# Patient Record
Sex: MALE | Race: WHITE | Employment: OTHER | URBAN - METROPOLITAN AREA
[De-identification: names, ages, dates, MRNs, and addresses within clinical notes are randomized per-mention and may not be internally consistent; named-entity substitution may affect disease eponyms.]

---

## 2017-01-20 ENCOUNTER — CLINICAL SUPPORT (OUTPATIENT)
Dept: CARDIOLOGY CLINIC | Age: 77
End: 2017-01-20

## 2017-01-20 DIAGNOSIS — Z95.810 PRESENCE OF AUTOMATIC CARDIOVERTER/DEFIBRILLATOR (AICD): Primary | ICD-10-CM

## 2017-03-21 RX ORDER — CARVEDILOL 12.5 MG/1
12.5 TABLET ORAL 2 TIMES DAILY WITH MEALS
Qty: 60 TAB | Refills: 5 | Status: SHIPPED | OUTPATIENT
Start: 2017-03-21 | End: 2018-02-17 | Stop reason: SDUPTHER

## 2017-03-21 RX ORDER — CHOLECALCIFEROL TAB 125 MCG (5000 UNIT) 125 MCG
TAB ORAL DAILY
Status: CANCELLED | OUTPATIENT
Start: 2017-03-21

## 2017-03-21 RX ORDER — CARBIDOPA AND LEVODOPA 25; 100 MG/1; MG/1
2 TABLET ORAL 3 TIMES DAILY
Status: CANCELLED | OUTPATIENT
Start: 2017-03-21

## 2017-03-21 RX ORDER — ROSUVASTATIN CALCIUM 10 MG/1
10 TABLET, COATED ORAL
Qty: 13 TAB | Refills: 5 | Status: SHIPPED | OUTPATIENT
Start: 2017-03-22 | End: 2017-08-01 | Stop reason: SDUPTHER

## 2017-03-21 NOTE — TELEPHONE ENCOUNTER
Patient lost all of his medication while traveling. He needs new rx's called into [facility] in [facility] [phone].

## 2017-03-28 ENCOUNTER — TELEPHONE (OUTPATIENT)
Dept: CARDIOLOGY CLINIC | Age: 77
End: 2017-03-28

## 2017-03-28 NOTE — TELEPHONE ENCOUNTER
Please call Manuel Matthews at [phone]. He needs to speak with you about upcoming hip surgery and blood thinning medication.      Thank you, Cam Pj

## 2017-03-29 NOTE — TELEPHONE ENCOUNTER
Please call the patient regarding his blood thinning medication. The patient can be reached at [phone]. He is currently scheduled to be seen for a procedure and needs information on possible changing the medication prior to the procedure. Thanks!

## 2017-03-30 NOTE — TELEPHONE ENCOUNTER
Pt last seen by NP in 12/16 but seen by you 10/16. He needs cardiac clearance and blood thinner instructions for R hip replacement by Dr. Loli Carter at [facility] 4/10/17. He takes  mg and Eliquis 5mg BID. He has CAD that is no amendable by stent/CABG. His ICD will be addressed by Dr. Danya Valenzuela. You did give risk stratifications in Oct. appt but is out dated for up coming surgery.

## 2017-04-07 ENCOUNTER — TELEPHONE (OUTPATIENT)
Dept: CARDIOLOGY CLINIC | Age: 77
End: 2017-04-07

## 2017-04-07 NOTE — TELEPHONE ENCOUNTER
The patient has a upcoming procedure and the nurse, Almas Bourne with [facility] anesthesia requested a clarifying not indicating the ICD/Pacer model. She can be reacehed at [phone] fax [phone]. Thanks!

## 2017-04-10 NOTE — TELEPHONE ENCOUNTER
Received call back from [facility] for pt hip surgery. Anesthesia wants OK to change ASA to 81 mg from 325 mg for surgery.  ( [phone] Temo Unger)

## 2017-05-05 ENCOUNTER — CLINICAL SUPPORT (OUTPATIENT)
Dept: CARDIOLOGY CLINIC | Age: 77
End: 2017-05-05

## 2017-05-05 ENCOUNTER — OFFICE VISIT (OUTPATIENT)
Dept: CARDIOLOGY CLINIC | Age: 77
End: 2017-05-05

## 2017-05-05 VITALS
RESPIRATION RATE: 16 BRPM | HEART RATE: 66 BPM | WEIGHT: 139.2 LBS | HEIGHT: 64 IN | DIASTOLIC BLOOD PRESSURE: 78 MMHG | BODY MASS INDEX: 23.76 KG/M2 | OXYGEN SATURATION: 97 % | SYSTOLIC BLOOD PRESSURE: 118 MMHG

## 2017-05-05 DIAGNOSIS — G20 PARKINSON'S DISEASE (HCC): ICD-10-CM

## 2017-05-05 DIAGNOSIS — R09.89 BRUIT OF RIGHT CAROTID ARTERY: ICD-10-CM

## 2017-05-05 DIAGNOSIS — I25.5 ISCHEMIC CARDIOMYOPATHY: ICD-10-CM

## 2017-05-05 DIAGNOSIS — I71.40 ABDOMINAL AORTIC ANEURYSM (AAA) WITHOUT RUPTURE: ICD-10-CM

## 2017-05-05 DIAGNOSIS — Z95.810 PRESENCE OF AUTOMATIC CARDIOVERTER/DEFIBRILLATOR (AICD): Primary | ICD-10-CM

## 2017-05-05 DIAGNOSIS — I48.4 ATYPICAL ATRIAL FLUTTER (HCC): ICD-10-CM

## 2017-05-05 DIAGNOSIS — I25.10 CORONARY ARTERY DISEASE INVOLVING NATIVE CORONARY ARTERY OF NATIVE HEART WITHOUT ANGINA PECTORIS: Primary | ICD-10-CM

## 2017-05-05 DIAGNOSIS — E78.00 PURE HYPERCHOLESTEROLEMIA: ICD-10-CM

## 2017-05-05 DIAGNOSIS — E55.9 VITAMIN D DEFICIENCY: ICD-10-CM

## 2017-05-05 DIAGNOSIS — I45.9 HEART BLOCK: ICD-10-CM

## 2017-05-05 RX ORDER — ASPIRIN 81 MG/1
81 TABLET ORAL DAILY
Qty: 90 TAB | Refills: 3
Start: 2017-05-05

## 2017-05-05 NOTE — PATIENT INSTRUCTIONS
If you're able to increase your water intake, it will help your dizziness. I heard a sound in your neck that I'd like investigated. I also need fasting labs for you. Please have your labs drawn (nothing to eat after midnight) and bring your breakfast and coffee with you. Then you can eat and have this test done on your neck the same day! no fever and no chills.

## 2017-05-05 NOTE — PROGRESS NOTES
Progress Note      Ian Fitzgerald is a 76 y.o. male. Last seen 10/16 by Dr. Mary Malloy and  by me. Here for routine follow-up of CAD, ICM and atypical aflutter. Patient Active Problem List   Diagnosis Code    CAD (coronary artery disease) I25.10    Parkinson's disease ([facility] [address] G20    Hyperlipidemia E78.5    STEMI (ST elevation myocardial infarction) ([facility] [address] I21.3    Heart block I45.9    Abdominal aortic aneurysm ([facility] [address] I71.4    Medication intolerance Z78.9    Ischemic cardiomyopathy I25.5    S/P ICD (internal cardiac defibrillator) procedure Z95.810    Vitamin D deficiency E55.9    Atypical atrial flutter ([facility] [address] I48.4          Cardiac testin. Echo  7/11/15 - EF 35%, basilar septum AK, inferior wall and inferobasilar wall. RV mild reduced function, mild AI, TR   10/13/15 - EF 15-20%, mild MR, moderate TR, mild to moderate AR    2. Cardiac catheterization  7/10/15 - severe native CAD w/ all proxs occluded, LAD after severly diseased first diagonal, patent moderately diseased SVG to OM 1, 2, SVG to RCA - Degenerated occluded - large clot burden in graft and diffuse distal graft and native RCA disease. Patent LIMA to LAD, LAD 60% distal to graft insertion, faint L to R collaterals. Given diffuse disease in culprit 23year old graft, PCI was not done. 3. Cholesterol   7/13/15 - TC 98, HDL 36, LDL 42.2, TG 99    4. Pacemaker placed 7/13/15  The pacemaker is a Medtronic Bright BENITES, model #ADDR [id number], serial [id number], atrial lead medtronic [id number], serial [id number], ventricular lead Medtronic model [id number] and serial [id number]    5. PERMANENT AICD INSERTION   DATE OF PROCEDURE: 3/1/2016 - Upgrade of the dual chamber pacemaker to dual chamber per Dr. Bray Search    Presenting sxs if CAD:  Chest pain (exertional exacerbation)    HPI  Feels well. No bleeding issues. Taking all medicines as prescribed.  Patient denies any exertional chest pain, palpitations, syncope, orthopnea, or paroxysmal nocturnal dyspnea. Some SOTELO he attributes to deconditioning. RLE edema due to hip surgery. Is working on increasing his water intake. Current Outpatient Prescriptions   Medication Sig    carvedilol (COREG) 12.5 mg tablet Take 1 Tab by mouth two (2) times daily (with meals).  rosuvastatin (CRESTOR) 10 mg tablet Take 1 Tab by mouth every Monday, Wednesday, Friday.  apixaban (ELIQUIS) 5 mg tablet Take 1 Tab by mouth two (2) times a day.  aspirin (ASPIRIN) 325 mg tablet Take 81 mg by mouth daily.  acetaminophen-codeine (TYLENOL-CODEINE #3) 300-30 mg per tablet Take 1 Tab by mouth every four (4) hours as needed for Pain (post op). Max Daily Amount: 6 Tabs.  GINKGO BILOBA (GINKOBA PO) Take  by mouth daily.  cholecalciferol, VITAMIN D3, (VITAMIN D3) 5,000 unit tab tablet Take  by mouth daily.  multivitamin (ONE A DAY) tablet Take 1 Tab by mouth daily.  coenzyme q10-vitamin e (COQ10 ) 100-100 mg-unit cap Take 1 Tab by mouth daily.  OTHER Take 1 Tab by mouth daily. Indications: Cholestoff- Nature Made    carbidopa-levodopa (SINEMET)  mg per tablet Take 2 Tabs by mouth three (3) times daily. No current facility-administered medications for this visit. No Known Allergies         Review of Systems   Constitutional: Negative for weight loss, malaise/fatigue and diaphoresis. Respiratory: Negative for cough, hemoptysis, sputum production and wheezing. Positive for shortness of breath as described above. Cardiovascular: Negative for chest pain, palpitations, orthopnea, claudication and PND. Positive for R sided leg swelling. Gastrointestinal: Negative for heartburn, nausea, vomiting, blood in stool and melena. Genitourinary: Negative for dysuria, hematuria and flank pain. Musculoskeletal: Negative for back pain and joint pain. Neurological: Negative for dizziness, focal weakness, seizures, loss of consciousness, weakness and headaches.    Endo/Heme/Allergies: Does not bruise/bleed easily. Psychiatric/Behavioral: Negative for mood disorders. Visit Vitals    /78 (BP 1 Location: Left arm, BP Patient Position: Sitting)    Pulse 66    Resp 16    Ht 5' 4\" (1.626 m)    Wt 139 lb 3.2 oz (63.1 kg)    SpO2 97%    BMI 23.89 kg/m2      Wt Readings from Last 3 Encounters:   05/05/17 139 lb 3.2 oz (63.1 kg)   12/19/16 137 lb 6.4 oz (62.3 kg)   10/18/16 137 lb 12.8 oz (62.5 kg)       Physical Exam   Vitals reviewed. Constitutional: He is oriented to person, place, and time. He appears well-developed. HENT:  Hearing intact, Eyes non-icteric  Head: Normocephalic. Neck: Neck supple. No JVD present. No tracheal deviation present. R sided bruit present. Heart: normal rate, regular rhythm, normal S1, S2, no murmurs, rubs, clicks or gallops. Pulses:Carotid pulses are 2+ on the right side, and 2+ on the left side. Pulmonary/Chest: Effort normal and breath sounds normal. He has no wheezes, rhonchi or rales. Abdominal: Soft. Bowel sounds are normal. No tenderness. He has no rebound. Musculoskeletal: He exhibits no edema. Moves all extremities well. Neurological: He is alert and oriented to person, place, and time. Skin: Skin is warm and dry. Psychiatric: He has a normal mood and affect. EKG:  Sinus at 66 bpm, first degree AV block, old inferior MI    ASSESSMENT and PLAN  Encounter Diagnoses   Name Primary?  Coronary artery disease involving native coronary artery of native heart without angina pectoris Yes    Ischemic cardiomyopathy     Pure hypercholesterolemia     Heart block     Parkinson's disease ([address]     Abdominal aortic aneurysm (AAA) without rupture ([facility] [address]     Vitamin D deficiency     Bruit of right carotid artery     Atypical atrial flutter [facility])       Mr. Lucy Tinsley is doing well from a cardiac perspective. He is having some mild SOTELO, which he attributes to decreased exercise, so plans to increase this.   Advised him that if his SOTELO worsens, he should contact us. No signs of volume overload. Mr. Sydney Pallas has not had lipid labs drawn as previously requested, so asked him to have them drawn in the near future, along with a Vitamin D level. Orders provided. Eliquis 5 mg bid continues for prevention of embolic CVA due to his hx of atypical atrial flutter. I note a new R-sided carotid bruit. Given his CAD, will obtain carotid dopplers to evaluate further. Dr. Ivette Pond managing patient's AAA. The patient was encouraged to continue current medications, exercise, eat a healthy diet and call with any new complaints or concerns. Oral and written instructions provided for patient. Patient verbalized understanding. Follow-up Disposition:  Return in about 6 months (around 11/5/2017).     Hermelinda Serrano, ANP

## 2017-05-05 NOTE — PROGRESS NOTES
Visit Vitals    /78 (BP 1 Location: Left arm, BP Patient Position: Sitting)    Pulse 66    Resp 16    Ht 5' 4\" (1.626 m)    Wt 139 lb 3.2 oz (63.1 kg)    SpO2 97%    BMI 23.89 kg/m2

## 2017-08-01 RX ORDER — ROSUVASTATIN CALCIUM 10 MG/1
10 TABLET, COATED ORAL
Qty: 13 TAB | Refills: 5 | Status: SHIPPED | OUTPATIENT
Start: 2017-08-02 | End: 2018-03-09 | Stop reason: SDUPTHER

## 2017-08-01 NOTE — TELEPHONE ENCOUNTER
Pt called and asked to schedule an appt for November per Brodie. After appt is made, refills will be approved. Pt confirmed understanding.  Appt made for November and rx approved by Dr. Carolin Fuentes

## 2017-08-03 ENCOUNTER — TELEPHONE (OUTPATIENT)
Dept: CARDIOLOGY CLINIC | Age: 77
End: 2017-08-03

## 2017-08-03 NOTE — TELEPHONE ENCOUNTER
Pt's daughter called to r/s the pt's remote from the 12th to after the 15th because he will be out of town on the 12th. Maritza Ramirez can be reached at [phone] or [phone].  [employer]

## 2017-10-24 ENCOUNTER — OFFICE VISIT (OUTPATIENT)
Dept: CARDIOLOGY CLINIC | Age: 77
End: 2017-10-24

## 2017-10-24 VITALS
WEIGHT: 138.6 LBS | HEIGHT: 64 IN | HEART RATE: 64 BPM | DIASTOLIC BLOOD PRESSURE: 62 MMHG | SYSTOLIC BLOOD PRESSURE: 120 MMHG | BODY MASS INDEX: 23.66 KG/M2

## 2017-10-24 DIAGNOSIS — I25.10 CORONARY ARTERY DISEASE INVOLVING NATIVE CORONARY ARTERY OF NATIVE HEART WITHOUT ANGINA PECTORIS: ICD-10-CM

## 2017-10-24 DIAGNOSIS — I25.5 ISCHEMIC CARDIOMYOPATHY: Primary | ICD-10-CM

## 2017-10-24 DIAGNOSIS — E78.00 PURE HYPERCHOLESTEROLEMIA: ICD-10-CM

## 2017-10-24 NOTE — PROGRESS NOTES
Visit Vitals    /62    Pulse 64    Ht 5' 4\" (1.626 m)    Wt 138 lb 9.6 oz (62.9 kg)    BMI 23.79 kg/m2

## 2017-10-24 NOTE — PROGRESS NOTES
LAST OFFICE VISIT : 5/5/2017      No diagnosis found. Jacqueline Chung is a 76 y.o. male  with hypertension, dyslipidemia, CAD s/p CABG referred for 6 month follow up.       Cardiac risk factors: dyslipidemia, male gender, hypertension. I have personally obtained the history from the patient. HISTORY OF PRESENTING ILLNESS     The pt reports that he has a rash across his chest, neck, and back for the last couple months. He reports that he has seen his primary care about this but has not seen a dermatologist. This rash is very itchy and he has not had any recent medication changes. He notes no bleeding on the eliquis or any irregularities in his heart rhythm. He is only using one pillow to sleep at night. The patient denies chest pain/ shortness of breath, orthopnea, PND, LE edema, palpitations, syncope, presyncope or fatigue. ACTIVE PROBLEM LIST     Patient Active Problem List    Diagnosis Date Noted    Vitamin D deficiency 05/05/2017    Atypical atrial flutter ([facility] [address] 05/05/2017    S/P ICD (internal cardiac defibrillator) procedure 03/01/2016    Ischemic cardiomyopathy 11/11/2015    Medication intolerance 07/29/2015    CAD (coronary artery disease) 07/10/2015    Parkinson's disease ([address] 07/10/2015    Hyperlipidemia 07/10/2015    STEMI (ST elevation myocardial infarction) ([address] 07/10/2015    Heart block 07/10/2015    Abdominal aortic aneurysm ([address] 07/10/2015           PAST MEDICAL HISTORY     Past Medical History:   Diagnosis Date    Ill-defined condition     MI    Parkinson's disease ([address]     Vitamin D deficiency 5/5/2017           PAST SURGICAL HISTORY     Past Surgical History:   Procedure Laterality Date    CARDIAC SURG PROCEDURE UNLIST      HX ORTHOPAEDIC      L hip replacement, R shoulder replacement.     HX PACEMAKER      INS PPM/ICD LED DUAL ONLY  7/13/2015         INS PPM/ICD LED DUAL ONLY  3/1/2016               ALLERGIES     No Known Allergies       FAMILY HISTORY     No family history on file. negative for cardiac disease       SOCIAL HISTORY     Social History     Social History    Marital status:      Spouse name: N/A    Number of children: N/A    Years of education: N/A     Social History Main Topics    Smoking status: Never Smoker    Smokeless tobacco: Never Used    Alcohol use No    Drug use: No    Sexual activity: Not on file     Other Topics Concern    Not on file     Social History Narrative         MEDICATIONS     Current Outpatient Prescriptions   Medication Sig    rosuvastatin (CRESTOR) 10 mg tablet Take 1 Tab by mouth every Monday, Wednesday, Friday.  apixaban (ELIQUIS) 5 mg tablet Take 1 Tab by mouth two (2) times a day.  aspirin delayed-release 81 mg tablet Take 1 Tab by mouth daily.  carvedilol (COREG) 12.5 mg tablet Take 1 Tab by mouth two (2) times daily (with meals).  acetaminophen-codeine (TYLENOL-CODEINE #3) 300-30 mg per tablet Take 1 Tab by mouth every four (4) hours as needed for Pain (post op). Max Daily Amount: 6 Tabs.  GINKGO BILOBA (GINKOBA PO) Take  by mouth daily.  cholecalciferol, VITAMIN D3, (VITAMIN D3) 5,000 unit tab tablet Take  by mouth daily.  multivitamin (ONE A DAY) tablet Take 1 Tab by mouth daily.  coenzyme q10-vitamin e (COQ10 ) 100-100 mg-unit cap Take 1 Tab by mouth daily.  OTHER Take 1 Tab by mouth daily. Indications: Cholestoff- Nature Made    carbidopa-levodopa (SINEMET)  mg per tablet Take 2 Tabs by mouth three (3) times daily. No current facility-administered medications for this visit. I have reviewed the nurses notes, vitals, problem list, allergy list, medical history, family, social history and medications. REVIEW OF SYMPTOMS      General: Pt denies excessive weight gain or loss. Pt is able to conduct ADL's  HEENT: Denies blurred vision, headaches, hearing loss, epistaxis and difficulty swallowing.   Respiratory: Denies cough, congestion, shortness of breath, SOTELO, wheezing or stridor. Cardiovascular: Denies precordial pain, palpitations, edema or PND  Gastrointestinal: Denies poor appetite, indigestion, abdominal pain or blood in stool  Genitourinary: Denies hematuria, dysuria, increased urinary frequency  Musculoskeletal: Denies joint pain or swelling from muscles or joints  Neurologic: Denies tremor, paresthesias, headache, or sensory motor disturbance  Psychiatric: Denies confusion, insomnia, depression  Integumentray: Denies rash, itching or ulcers. Hematologic: Denies easy bruising, bleeding     PHYSICAL EXAMINATION      There were no vitals filed for this visit. General: Well developed, in no acute distress. HEENT: No jaundice, oral mucosa moist, no oral ulcers  Neck: Supple, no stiffness, no lymphadenopathy, supple  Heart:  Normal S1/S2 negative S3 or S4. Regular, no murmur, gallop or rub, no jugular venous distention  Respiratory: Clear bilaterally x 4, no wheezing or rales  Extremities:  No edema, normal cap refill, no cyanosis. Musculoskeletal: No clubbing, no deformities  Neuro: Resting tremor in his right hand. A&Ox3, speech clear, gait stable, cooperative, no focal neurologic deficits  Skin: Skin color is normal. No rashes or lesions. Non diaphoretic, moist.       DIAGNOSTIC DATA     1. Echo  7/11/15 - EF 35%, basilar septum AK, inferior wall and inferobasilar wall. RV mild reduced function, mild AI, TR   10/13/15 - EF 15-20%, mild MR, moderate TR, mild to moderate DC    2. Cardiac catheterization  7/10/15 - severe native CAD w/ all proxs occluded, LAD after severly diseased first diagonal, patent moderately diseased SVG to OM 1, 2, SVG to RCA - Degenerated occluded - large clot burden in graft and diffuse distal graft and native RCA disease. Patent LIMA to LAD, LAD 60% distal to graft insertion, faint L to R collaterals. Given diffuse disease in culprit 23year old graft, PCI was not done.      3. Cholesterol   7/13/15 - TC 98, HDL 36, LDL 42.2, TG 99    4. Pacemaker placed 7/13/15  The pacemaker is a Medtronic Bright BENITES, model #ADDR [id number], serial [id number], atrial lead medtronic [id number], serial [id number], ventricular lead Medtronic model [id number] and serial [id number]    5. PERMANENT AICD INSERTION   DATE OF PROCEDURE: 3/1/2016 - Upgrade of the dual chamber pacemaker to dual chamber per Dr. Aissatou Hernandez Results   Component Value Date/Time    WBC 4.0 02/11/2016 10:44 AM    Hemoglobin (POC) 16.3 07/10/2015 08:24 PM    HGB 15.4 02/11/2016 10:44 AM    Hematocrit (POC) 48 07/10/2015 08:24 PM    HCT 47.0 02/11/2016 10:44 AM    PLATELET 446 19/11/3615 10:44 AM    MCV 91 02/11/2016 10:44 AM      Lab Results   Component Value Date/Time    Sodium 141 03/01/2016 11:19 AM    Potassium 4.0 03/01/2016 11:19 AM    Chloride 106 03/01/2016 11:19 AM    CO2 28 03/01/2016 11:19 AM    Anion gap 7 03/01/2016 11:19 AM    Glucose 84 03/01/2016 11:19 AM    BUN 22 03/01/2016 11:19 AM    Creatinine 1.09 03/01/2016 11:19 AM    BUN/Creatinine ratio 20 03/01/2016 11:19 AM    GFR est AA >60 03/01/2016 11:19 AM    GFR est non-AA >60 03/01/2016 11:19 AM    Calcium 9.0 03/01/2016 11:19 AM    Bilirubin, total 0.9 11/25/2015 03:51 PM    AST (SGOT) 26 11/25/2015 03:51 PM    Alk. phosphatase 102 11/25/2015 03:51 PM    Protein, total 7.4 11/25/2015 03:51 PM    Albumin 4.5 11/25/2015 03:51 PM    Globulin 2.9 11/25/2015 03:51 PM    A-G Ratio 1.6 11/25/2015 03:51 PM    ALT (SGPT) 16 11/25/2015 03:51 PM           ASSESSMENT/RECOMMENDATIONS:.      1. CAD  - he is asymptomatic at this time. I do not believe any further cardiac testing is needed. Continue risk factor modification. 2. Dyslipidemia  - His has no current lipids so I will provide him with a lab slip to get this checked before his next visit. 3. Ischemic cardiomyopathy  - he appears nearer to NYHA class I, no evidence on physical exam of heart failure. Continue current medical regimen.  He is currently not on an ace inhibitor I believe this was related to renal dysfunction as noted in July of 2015. 4. Return in 6 months or PRN. No orders of the defined types were placed in this encounter. Follow-up Disposition: Not on File      I have discussed the diagnosis with  Lisa Osborne and the intended plan as seen in the above orders. Questions were answered concerning future plans. I have discussed medication side effects and warnings with the patient as well. Thank you,  Randa Morales MD for involving me in the care of  Lisa Osborne. Please do not hesitate to contact me for further questions/concerns. Written by Jimbo Verma, as dictated by Sanjuana Partida MD.     Brooklyn Aguayo MD, [address] [place] [address] Rd[address]      [facility], [address] [place] [address], [facility] [address]      [phone] / [phone] Fax

## 2017-10-25 LAB
ALBUMIN SERPL-MCNC: 4.7 G/DL (ref 3.5–4.8)
ALP SERPL-CCNC: 110 IU/L (ref 39–117)
ALT SERPL-CCNC: 7 IU/L (ref 0–44)
AST SERPL-CCNC: 25 IU/L (ref 0–40)
BILIRUB DIRECT SERPL-MCNC: 0.27 MG/DL (ref 0–0.4)
BILIRUB SERPL-MCNC: 0.9 MG/DL (ref 0–1.2)
CHOLEST SERPL-MCNC: 170 MG/DL (ref 100–199)
HDLC SERPL-MCNC: 35 MG/DL
INTERPRETATION, 910389: NORMAL
LDLC SERPL CALC-MCNC: 118 MG/DL (ref 0–99)
PROT SERPL-MCNC: 6.9 G/DL (ref 6–8.5)
TRIGL SERPL-MCNC: 86 MG/DL (ref 0–149)
VLDLC SERPL CALC-MCNC: 17 MG/DL (ref 5–40)

## 2017-11-02 DIAGNOSIS — I25.10 CORONARY ARTERY DISEASE INVOLVING NATIVE CORONARY ARTERY OF NATIVE HEART WITHOUT ANGINA PECTORIS: Primary | ICD-10-CM

## 2017-12-11 ENCOUNTER — TELEPHONE (OUTPATIENT)
Dept: CARDIOLOGY CLINIC | Age: 77
End: 2017-12-11

## 2017-12-11 NOTE — TELEPHONE ENCOUNTER
Pt's emergency contact called regarding his remote check. Please give her a call back @ [phone]. Thanks!   Trey Adkins

## 2017-12-12 ENCOUNTER — OFFICE VISIT (OUTPATIENT)
Dept: CARDIOLOGY CLINIC | Age: 77
End: 2017-12-12

## 2017-12-12 DIAGNOSIS — Z95.810 PRESENCE OF AUTOMATIC CARDIOVERTER/DEFIBRILLATOR (AICD): Primary | ICD-10-CM

## 2018-02-15 ENCOUNTER — TELEPHONE (OUTPATIENT)
Dept: CARDIOLOGY CLINIC | Age: 78
End: 2018-02-15

## 2018-02-17 RX ORDER — CARVEDILOL 12.5 MG/1
12.5 TABLET ORAL 2 TIMES DAILY WITH MEALS
Qty: 60 TAB | Refills: 5 | Status: SHIPPED | OUTPATIENT
Start: 2018-02-17 | End: 2018-02-21 | Stop reason: SDUPTHER

## 2018-02-22 RX ORDER — CARVEDILOL 12.5 MG/1
12.5 TABLET ORAL 2 TIMES DAILY WITH MEALS
Qty: 60 TAB | Refills: 5 | Status: SHIPPED | OUTPATIENT
Start: 2018-02-22 | End: 2019-10-29

## 2018-03-09 RX ORDER — ROSUVASTATIN CALCIUM 10 MG/1
10 TABLET, COATED ORAL
Qty: 13 TAB | Refills: 3 | Status: SHIPPED | OUTPATIENT
Start: 2018-03-09 | End: 2018-04-05 | Stop reason: SDUPTHER

## 2018-03-15 ENCOUNTER — OFFICE VISIT (OUTPATIENT)
Dept: CARDIOLOGY CLINIC | Age: 78
End: 2018-03-15

## 2018-03-15 DIAGNOSIS — Z95.810 PRESENCE OF AUTOMATIC CARDIOVERTER/DEFIBRILLATOR (AICD): Primary | ICD-10-CM

## 2018-04-05 RX ORDER — ROSUVASTATIN CALCIUM 10 MG/1
10 TABLET, COATED ORAL
Qty: 39 TAB | Refills: 0 | Status: SHIPPED | OUTPATIENT
Start: 2018-04-06 | End: 2019-03-05

## 2018-04-05 NOTE — TELEPHONE ENCOUNTER
Requested Prescriptions     Pending Prescriptions Disp Refills    rosuvastatin (CRESTOR) 10 mg tablet 39 Tab 0     Sig: Take 1 Tab by mouth every Monday, Wednesday, Friday.      rx approved by Dr. Wiley Balderas to [address]  on [facility]

## 2018-04-25 ENCOUNTER — TELEPHONE (OUTPATIENT)
Dept: CARDIOLOGY CLINIC | Age: 78
End: 2018-04-25

## 2018-04-25 NOTE — TELEPHONE ENCOUNTER
Pt received information from [facility] [employer] regarding his medication eliquis and [employer] suggested that the pt switch from eliquis to warfarin. Pt would like to know if this is an option and pt's daughter is also calling to ask if the pt switched his medication if this would help the pt's restless legs, pt has restless leg syndrome. Jose Lama can be reached @ [phone]. Thanks!

## 2018-04-26 NOTE — TELEPHONE ENCOUNTER
Daughter wanted to know if you thought restless leg would be a side effect from eliquis . Informed her that I had not heard of that side effect but would check with you.

## 2018-04-30 DIAGNOSIS — I25.10 CORONARY ARTERY DISEASE INVOLVING NATIVE CORONARY ARTERY OF NATIVE HEART WITHOUT ANGINA PECTORIS: ICD-10-CM

## 2018-05-08 ENCOUNTER — TELEPHONE (OUTPATIENT)
Dept: CARDIOLOGY CLINIC | Age: 78
End: 2018-05-08

## 2018-05-08 NOTE — TELEPHONE ENCOUNTER
Pt's daughter is calling to clarify the dosage for the pt's medication carvedilol. She can be reached @ [phone]. Thanks!

## 2018-05-09 NOTE — TELEPHONE ENCOUNTER
Informed daughter that Coreg is 12.5 mg BID in his chart. She states PCP recently gave him 6.25 mg BID. Informed her that Dr Nanda Wright has not examined him since 10/17 and PCP may have had a reason to change it. She will call PCP.

## 2018-05-10 DIAGNOSIS — I25.10 CORONARY ARTERY DISEASE INVOLVING NATIVE CORONARY ARTERY OF NATIVE HEART WITHOUT ANGINA PECTORIS: ICD-10-CM

## 2018-06-06 ENCOUNTER — OFFICE VISIT (OUTPATIENT)
Dept: CARDIOLOGY CLINIC | Age: 78
End: 2018-06-06

## 2018-06-06 ENCOUNTER — CLINICAL SUPPORT (OUTPATIENT)
Dept: CARDIOLOGY CLINIC | Age: 78
End: 2018-06-06

## 2018-06-06 VITALS
HEIGHT: 64 IN | SYSTOLIC BLOOD PRESSURE: 110 MMHG | OXYGEN SATURATION: 98 % | DIASTOLIC BLOOD PRESSURE: 70 MMHG | HEART RATE: 66 BPM | WEIGHT: 142 LBS | BODY MASS INDEX: 24.24 KG/M2

## 2018-06-06 DIAGNOSIS — I48.4 ATYPICAL ATRIAL FLUTTER (HCC): ICD-10-CM

## 2018-06-06 DIAGNOSIS — Z95.810 PRESENCE OF AUTOMATIC CARDIOVERTER/DEFIBRILLATOR (AICD): ICD-10-CM

## 2018-06-06 DIAGNOSIS — Z95.810 CARDIAC DEFIBRILLATOR IN SITU: Primary | ICD-10-CM

## 2018-06-06 DIAGNOSIS — E78.00 PURE HYPERCHOLESTEROLEMIA: ICD-10-CM

## 2018-06-06 DIAGNOSIS — I25.5 ISCHEMIC CARDIOMYOPATHY: Primary | ICD-10-CM

## 2018-06-06 DIAGNOSIS — I25.10 CORONARY ARTERY DISEASE INVOLVING NATIVE CORONARY ARTERY OF NATIVE HEART WITHOUT ANGINA PECTORIS: ICD-10-CM

## 2018-06-06 NOTE — PROGRESS NOTES
LAST OFFICE VISIT : 6/6/2018        ICD-10-CM ICD-9-CM   1. Ischemic cardiomyopathy I25.5 414.8   2. Coronary artery disease involving native coronary artery of native heart without angina pectoris I25.10 414.01   3. Presence of automatic cardioverter/defibrillator (AICD) Z95.810 V45.02   4. Pure hypercholesterolemia E78.00 272.0   5. Atypical atrial flutter [facility]) I48.4 427.32            Dung Graves is a [de-identified] y.o. male with CAD, STEMI, HLD, and Atrial flutter referred for f/u on his current conditions. Cardiac risk factors: CAD, STEMI, dyslipidemia, ischemic cardiomyopathy. I have personally obtained the history from the patient. HISTORY OF PRESENTING ILLNESS     Pt reports he is doing well overall. However, he has been experiencing some SOTELO coming back from picking up his mail from the mailbox, approximately walking about 70 feet. This is a recent development. Pt hasn't needed to use a pillow to help him sleep. AICD was checked today. Pt has been on lisinopril before, and cannot get back on it since he his kidney function has decreased. Pt has self discontinued his Crestor, as he believes he's developed an adverse reaction to Crestor (myalgia). He has tried using Cholest-off, but hasn't been taking that for some time. He also admits to reducing exercise. Last lipid panel indicates his cholesterol is not well control. The patient denies chest pain, orthopnea, PND, LE edema, palpitations, syncope, presyncope or fatigue.          ACTIVE PROBLEM LIST     Patient Active Problem List    Diagnosis Date Noted    Vitamin D deficiency 05/05/2017    Atypical atrial flutter ([facility] [address] 05/05/2017    S/P ICD (internal cardiac defibrillator) procedure 03/01/2016    Ischemic cardiomyopathy 11/11/2015    Medication intolerance 07/29/2015    CAD (coronary artery disease) 07/10/2015    Parkinson's disease ([facility] [address] 07/10/2015    Hyperlipidemia 07/10/2015    STEMI (ST elevation myocardial infarction) ([facility] [address] 07/10/2015    Heart block 07/10/2015    Abdominal aortic aneurysm ([facility] [address] 07/10/2015           PAST MEDICAL HISTORY     Past Medical History:   Diagnosis Date    Ill-defined condition     MI    Parkinson's disease ([facility] [address]     Vitamin D deficiency 5/5/2017           PAST SURGICAL HISTORY     Past Surgical History:   Procedure Laterality Date    CARDIAC SURG PROCEDURE UNLIST      HX ORTHOPAEDIC      L hip replacement, R shoulder replacement.  HX PACEMAKER      INS PPM/ICD LED DUAL ONLY  7/13/2015         INS PPM/ICD LED DUAL ONLY  3/1/2016               ALLERGIES     No Known Allergies       FAMILY HISTORY     No family history on file. negative for cardiac disease       SOCIAL HISTORY     Social History     Social History    Marital status:      Spouse name: N/A    Number of children: N/A    Years of education: N/A     Social History Main Topics    Smoking status: Never Smoker    Smokeless tobacco: Never Used    Alcohol use No    Drug use: No    Sexual activity: Not Asked     Other Topics Concern    None     Social History Narrative         MEDICATIONS     Current Outpatient Prescriptions   Medication Sig    apixaban (ELIQUIS) 5 mg tablet Take 1 Tab by mouth two (2) times a day.  rosuvastatin (CRESTOR) 10 mg tablet Take 1 Tab by mouth every Monday, Wednesday, Friday.  carvedilol (COREG) 12.5 mg tablet Take 1 Tab by mouth two (2) times daily (with meals).  aspirin delayed-release 81 mg tablet Take 1 Tab by mouth daily.  cholecalciferol, VITAMIN D3, (VITAMIN D3) 5,000 unit tab tablet Take  by mouth daily.  coenzyme q10-vitamin e (COQ10 ) 100-100 mg-unit cap Take 1 Tab by mouth daily.  carbidopa-levodopa (SINEMET)  mg per tablet Take 2 Tabs by mouth three (3) times daily.  acetaminophen-codeine (TYLENOL-CODEINE #3) 300-30 mg per tablet Take 1 Tab by mouth every four (4) hours as needed for Pain (post op). Max Daily Amount: 6 Tabs.     GINKGO BILOBA (GINKOBA PO) Take  by mouth daily.  multivitamin (ONE A DAY) tablet Take 1 Tab by mouth daily.  OTHER Take 1 Tab by mouth daily. Indications: Cholestoff- Nature Made     No current facility-administered medications for this visit. I have reviewed the nurses notes, vitals, problem list, allergy list, medical history, family, social history and medications. REVIEW OF SYMPTOMS      General: Pt denies excessive weight gain or loss. Pt is able to conduct ADL's  HEENT: Denies blurred vision, headaches, hearing loss, epistaxis and difficulty swallowing. Respiratory: Denies cough, congestion, shortness of breath, SOTELO, wheezing or stridor. Cardiovascular: Denies precordial pain, palpitations, edema or PND  Gastrointestinal: Denies poor appetite, indigestion, abdominal pain or blood in stool  Genitourinary: Denies hematuria, dysuria, increased urinary frequency  Musculoskeletal: Denies joint pain or swelling from muscles or joints  Neurologic: Denies tremor, paresthesias, headache, or sensory motor disturbance  Psychiatric: Denies confusion, insomnia, depression  Integumentray: Denies rash, itching or ulcers. Hematologic: Denies easy bruising, bleeding     PHYSICAL EXAMINATION      Vitals:    06/06/18 1454   BP: 110/70   Pulse: 66   SpO2: 98%   Weight: 142 lb (64.4 kg)   Height: 5' 4\" (1.626 m)     General: Well developed, in no acute distress. HEENT: No jaundice, oral mucosa moist, no oral ulcers  Neck: Supple, no stiffness, no lymphadenopathy, supple  Heart: regular rate and rhythm  Respiratory: Clear bilaterally x 4, no wheezing or rales  Extremities:  No edema, normal cap refill, no cyanosis. Musculoskeletal: No clubbing, no deformities  Neuro: A&Ox3, speech clear, gait stable, cooperative, no focal neurologic deficits  Skin: Skin color is normal. No rashes or lesions. Non diaphoretic, moist.    EKG: Sinus bradycardia with RBBB, which is a new finding.       DIAGNOSTIC DATA     1. Echo  7/11/15 - EF 35%, basilar septum AK, inferior wall and inferobasilar wall. RV mild reduced function, mild AI, TR   10/13/15 - EF 15-20%, mild MR, moderate TR, mild to moderate IA    2. Cardiac catheterization  7/10/15 - severe native CAD w/ all proxs occluded, LAD after severly diseased first diagonal, patent moderately diseased SVG to OM 1, 2, SVG to RCA - Degenerated occluded - large clot burden in graft and diffuse distal graft and native RCA disease. Patent LIMA to LAD, LAD 60% distal to graft insertion, faint L to R collaterals. Given diffuse disease in culprit 23year old graft, PCI was not done. 3. Cholesterol   7/13/15 - TC 98, HDL 36, LDL 42.2, TG 99  4/30/18- , HDL 34, , 81    4. Pacemaker placed 7/13/15  The pacemaker is a Medtronic Adapta DR, model #ADDR [id number], serial [id number], atrial lead medtronic [id number], serial [id number], ventricular lead Medtronic model [id number] and serial [id number]    5. PERMANENT AICD INSERTION   DATE OF PROCEDURE: 3/1/2016 - Upgrade of the dual chamber pacemaker to dual chamber per Dr. Myrtle Hamm Results   Component Value Date/Time    WBC 4.0 02/11/2016 10:44 AM    Hemoglobin (POC) 16.3 07/10/2015 08:24 PM    HGB 15.4 02/11/2016 10:44 AM    Hematocrit (POC) 48 07/10/2015 08:24 PM    HCT 47.0 02/11/2016 10:44 AM    PLATELET 564 (L) 92/90/8997 10:44 AM    MCV 91 02/11/2016 10:44 AM      Lab Results   Component Value Date/Time    Sodium 141 03/01/2016 11:19 AM    Potassium 4.0 03/01/2016 11:19 AM    Chloride 106 03/01/2016 11:19 AM    CO2 28 03/01/2016 11:19 AM    Anion gap 7 03/01/2016 11:19 AM    Glucose 84 03/01/2016 11:19 AM    BUN 22 (H) 03/01/2016 11:19 AM    Creatinine 1.09 03/01/2016 11:19 AM    BUN/Creatinine ratio 20 03/01/2016 11:19 AM    GFR est AA >60 03/01/2016 11:19 AM    GFR est non-AA >60 03/01/2016 11:19 AM    Calcium 9.0 03/01/2016 11:19 AM    Bilirubin, total 0.9 10/24/2017 01:56 PM    AST (SGOT) 25 10/24/2017 01:56 PM    Alk. phosphatase 110 10/24/2017 01:56 PM    Protein, total 6.9 10/24/2017 01:56 PM    Albumin 4.7 10/24/2017 01:56 PM    Globulin 2.9 11/25/2015 03:51 PM    A-G Ratio 1.6 11/25/2015 03:51 PM    ALT (SGPT) 7 10/24/2017 01:56 PM           ASSESSMENT/RECOMMENDATIONS:.      1. Atrial Flutter: Pt is currently on Apixaban with no bleeding issues  2. Heart Failure: He has NYHA class 1-2 right now, and his last echo was about 3 years ago. I recommended repeat echo now to check his heart function. His last heart function was 15%  3. Dyslipidmia: Lipids are followed by his PCP. Pt has been statin intolerant due to having myalgias when using simvastatin and rosuvastatin. The option of taking Repatha was also discussed to better manage his lipids. Pt was advised to continue reducing his fat intake, avoid animal meat, and to increase exercise.    -Will attempt to get Praluent for him and will work on this  4. Cardiomyopathy: Pt cannot be placed on ACE inhibitor since his creatinine was elevated. Should he have worsening sx of heart failure, he may be placed on Entresto  5. Follow up in 3 months    Orders Placed This Encounter    AMB POC EKG ROUTINE W/ 12 LEADS, INTER & REP     Order Specific Question:   Reason for Exam:     Answer:   cad        Follow-up Disposition:  Return in about 3 months (around 9/6/2018). I have discussed the diagnosis with  Lisa Osborne and the intended plan as seen in the above orders. Questions were answered concerning future plans. I have discussed medication side effects and warnings with the patient as well. Thank you,  Rosanna Colunga MD for involving me in the care of  Lisa Osborne. Please do not hesitate to contact me for further questions/concerns. Written by Rory Nolasco, as dictated by Ash Ramirez MD.     Sofia Villavicencio MD, [facility]    Patient Care Team:  Rosanna Colunga MD as PCP - [facility])  Ash Ramirez MD as Physician (Cardiology)  Creig Galeazzi, MD as Physician (Cardiology)  Arline Roberto RN as Ambulatory Care Navigator (Cardiology)  Torrey Cota MD (Orthopedic Surgery)  Fidel Dennis MD as Physician (General and Vascular Surgery)    [address], [address] [place] [address] Drive      [phone] / [phone] Fax

## 2018-06-06 NOTE — PROGRESS NOTES
Once in a while a little discomfort in chest     Visit Vitals    /70 (BP 1 Location: Right arm, BP Patient Position: Sitting)    Pulse 66    Ht 5' 4\" (1.626 m)    Wt 142 lb (64.4 kg)    SpO2 98%    BMI 24.37 kg/m2

## 2018-06-06 NOTE — MR AVS SNAPSHOT
[address] [place] [address] [facility] 
[phone] Patient: Richie Biggs MRN: [id number] KCK:[birth year]01 Visit Information Date & Time Provider Department Dept. Phone Encounter #  
 6/6/2018  3:00 PM Maria Del Carmen Gomez MD [facility] Leigh Chris [phone] [id number] Follow-up Instructions Return in about 3 months (around 9/6/2018). Your Appointments 6/15/2018 10:00 AM  
ECHO CARDIOGRAMS 2D with LEANNE NAVA  
[facility] ([facility]) Appt Note: echo per dr Zhang Nassar [address] [facility] [address]  
[phone]  
  
   
 [address] [facility] [address] [address]  
  
    
 9/18/2018 11:40 AM  
ESTABLISHED PATIENT with Maria Del Carmen Gomez MD  
[facility] ([place] SCHEDULING) Appt Note: 3 mo fup  
 [address] [facility] [address]  
[phone]  
  
   
 [address] [place] [address] [address] [address]  
  
    
 6/26/2019 11:30 AM  
PACEMAKER with PACEMAKERSPARLKE  
[facility] ([place] SCHEDULING) Appt Note: jesus/icd/stf/lat  
 [address] [facility] [address] [address]  
[phone]  
  
   
 [address] [place] [facility]  
  
    
  
 9/13/2018 10:45 AM  
REMOTE OFFICE VISIT with Valencia Aggarwal [facility] ([facility] SCHEDULING) Appt Note: lat icd/stf  
 [address] [facility] [address]  
[phone]  
  
   
 [address] [facility] [address] [address]  
  
    
 12/18/2018 10:15 AM  
REMOTE OFFICE VISIT with Valencia Aggarwal [facility] (R[facility] SCHEDULING) Appt Note: lat icd/stf  
 [address] [facility]  
[phone]  
  
    
 3/21/2019  9:00 AM  
 REMOTE OFFICE VISIT with Jorge Soriano [facility] (R[facility] SCHEDULING) Appt Note: lat icd/stf  
 [address] [facility] [address] [facility]  
[phone] Upcoming Health Maintenance Date Due DTaP/Tdap/Td series (1 - Tdap) 1/26/1961 ZOSTER VACCINE AGE 60> 11/26/1997 GLAUCOMA SCREENING Q2Y 1/26/2003 Pneumococcal 65+ Low/Medium Risk (1 of 2 - PCV13) 1/26/2003 MEDICARE YEARLY EXAM 3/14/2018 Influenza Age 5 to Adult 8/1/2018 Allergies as of 6/6/2018  Review Complete On: 6/6/2018 By: Eric Groves MD  
 No Known Allergies Current Immunizations  Never Reviewed No immunizations on file. Not reviewed this visit You Were Diagnosed With   
  
 Codes Comments Ischemic cardiomyopathy    -  Primary ICD-10-CM: I25.5 ICD-9-CM: 414.8 Coronary artery disease involving native coronary artery of native heart without angina pectoris     ICD-10-CM: I25.10 ICD-9-CM: 414.01 Presence of automatic cardioverter/defibrillator (AICD)     ICD-10-CM: Z95.810 ICD-9-CM: V45.02   
 Pure hypercholesterolemia     ICD-10-CM: E78.00 ICD-9-CM: 272.0 Atypical atrial flutter (HCC)     ICD-10-CM: I48.4 ICD-9-CM: 427.32 Vitals BP Pulse Height(growth percentile) Weight(growth percentile) SpO2 BMI  
 110/70 (BP 1 Location: Right arm, BP Patient Position: Sitting) 66 5' 4\" (1.626 m) 142 lb (64.4 kg) 98% 24.37 kg/m2 Smoking Status Never Smoker Vitals History BMI and BSA Data Body Mass Index Body Surface Area  
 24.37 kg/m 2 1.71 m 2 Preferred Pharmacy Pharmacy Name Phone [address] [phone] Your Updated Medication List  
  
   
This list is accurate as of 6/6/18  3:58 PM.  Always use your most recent med list.  
  
  
  
  
 acetaminophen-codeine 300-30 mg per tablet Commonly known as:  TYLENOL-CODEINE #3  
 Take 1 Tab by mouth every four (4) hours as needed for Pain (post op). Max Daily Amount: 6 Tabs. apixaban 5 mg tablet Commonly known as:  Aguilar Sheets Take 1 Tab by mouth two (2) times a day. aspirin delayed-release 81 mg tablet Take 1 Tab by mouth daily. carvedilol 12.5 mg tablet Commonly known as:  Wendall Lundborg Take 1 Tab by mouth two (2) times daily (with meals). cholecalciferol (VITAMIN D3) 5,000 unit Tab tablet Commonly known as:  VITAMIN D3 Take  by mouth daily. COQ10  100-100 mg-unit Cap Generic drug:  coenzyme q10-vitamin e Take 1 Tab by mouth daily. GINKOBA PO Take  by mouth daily. multivitamin tablet Commonly known as:  ONE A DAY Take 1 Tab by mouth daily. OTHER Take 1 Tab by mouth daily. Indications: Cholestoff- Nature Made  
  
 rosuvastatin 10 mg tablet Commonly known as:  CRESTOR Take 1 Tab by mouth every Monday, Wednesday, Friday. SINEMET  mg per tablet Generic drug:  carbidopa-levodopa Take 2 Tabs by mouth three (3) times daily. We Performed the Following AMB POC EKG ROUTINE W/ 12 LEADS, INTER & REP [74412 CPT(R)] Follow-up Instructions Return in about 3 months (around 9/6/2018). [facility]! Dear Mariana Heads: Thank you for requesting a BetterYou account. Our records indicate that you already have an active BetterYou account. You can access your account anytime at [email or web address] Did you know that you can access your hospital and ER discharge instructions at any time in BetterYou? You can also review all of your test results from your hospital stay or ER visit. Additional Information If you have questions, please visit the Frequently Asked Questions section of the BetterYou website at [email or web address] Remember, BetterYou is NOT to be used for urgent needs. For medical emergencies, dial 911. Now available from your iPhone and Android! Please provide this summary of care documentation to your next provider. Your primary care clinician is listed as Mathew Fregoso. If you have any questions after today's visit, please call [phone].

## 2018-06-15 ENCOUNTER — CLINICAL SUPPORT (OUTPATIENT)
Dept: CARDIOLOGY CLINIC | Age: 78
End: 2018-06-15

## 2018-06-15 DIAGNOSIS — I48.92 ATRIAL FLUTTER, UNSPECIFIED TYPE (HCC): Primary | ICD-10-CM

## 2018-06-15 DIAGNOSIS — I48.92 ATRIAL FLUTTER, UNSPECIFIED TYPE (HCC): ICD-10-CM

## 2018-12-31 DIAGNOSIS — I25.10 CORONARY ARTERY DISEASE INVOLVING NATIVE CORONARY ARTERY OF NATIVE HEART WITHOUT ANGINA PECTORIS: ICD-10-CM

## 2018-12-31 RX ORDER — CARBIDOPA AND LEVODOPA 25; 100 MG/1; MG/1
2 TABLET ORAL 3 TIMES DAILY
Status: CANCELLED | OUTPATIENT
Start: 2018-12-31

## 2019-03-05 ENCOUNTER — OFFICE VISIT (OUTPATIENT)
Dept: CARDIOLOGY CLINIC | Age: 79
End: 2019-03-05

## 2019-03-05 VITALS
DIASTOLIC BLOOD PRESSURE: 70 MMHG | BODY MASS INDEX: 22.98 KG/M2 | HEART RATE: 80 BPM | SYSTOLIC BLOOD PRESSURE: 130 MMHG | HEIGHT: 64 IN | WEIGHT: 134.6 LBS

## 2019-03-05 DIAGNOSIS — I25.5 ISCHEMIC CARDIOMYOPATHY: ICD-10-CM

## 2019-03-05 DIAGNOSIS — I25.10 CORONARY ARTERY DISEASE INVOLVING NATIVE CORONARY ARTERY OF NATIVE HEART WITHOUT ANGINA PECTORIS: ICD-10-CM

## 2019-03-05 DIAGNOSIS — I48.4 ATYPICAL ATRIAL FLUTTER (HCC): Primary | ICD-10-CM

## 2019-03-05 RX ORDER — NITROGLYCERIN 0.4 MG/1
0.4 TABLET SUBLINGUAL
Qty: 1 BOTTLE | Refills: 5 | Status: SHIPPED | OUTPATIENT
Start: 2019-03-05 | End: 2019-03-11 | Stop reason: SDUPTHER

## 2019-03-05 NOTE — PROGRESS NOTES
Visit Vitals  /70   Pulse 80   Ht 5' 4\" (1.626 m)   Wt 134 lb 9.6 oz (61.1 kg)   BMI 23.10 kg/m²     Medication changes for this OV VO Dr Lois Mcknight

## 2019-03-05 NOTE — PROGRESS NOTES
LAST OFFICE VISIT : 12/18/2018        ICD-10-CM ICD-9-CM   1. Atypical atrial flutter (HCC) I48.4 427.32   2. Ischemic cardiomyopathy I25.5 414.8   3. Coronary artery disease involving native coronary artery of native heart without angina pectoris I25.10 414.01            Manuela Govea is a 78 y.o. male with CAD, STEMI, HLD and atrial flutter referred for follow up. Cardiac risk factors: dyslipidemia, male gender, stress  I have personally obtained the history from the patient. HISTORY OF PRESENTING ILLNESS     Overall the pt states he is doing well. Pt states that he had an \"electric shocking\" sensation around his chest but he states that it wasn't painful. Pt states that he's somewhat stressed. Pt denies any bleeding issues and is anticoagulated on Eliquis. Pt asks if he could have some nitroglycerin on a needed basis. Pt's had a few episodes of headache as well as having constant fatigue. He's having some SOB as well but he's unsure what the cause of this was. The patient denies orthopnea, PND, LE edema, palpitations, syncope, presyncope.          ACTIVE PROBLEM LIST     Patient Active Problem List    Diagnosis Date Noted    Vitamin D deficiency 05/05/2017    Atypical atrial flutter ([address] 05/05/2017    S/P ICD (internal cardiac defibrillator) procedure 03/01/2016    Ischemic cardiomyopathy 11/11/2015    Medication intolerance 07/29/2015    CAD (coronary artery disease) 07/10/2015    Parkinson's disease ([address] 07/10/2015    Hyperlipidemia 07/10/2015    STEMI (ST elevation myocardial infarction) ([address] 07/10/2015    Heart block 07/10/2015    Abdominal aortic aneurysm ([address] 07/10/2015           PAST MEDICAL HISTORY     Past Medical History:   Diagnosis Date    Ill-defined condition     MI    Parkinson's disease ([address]     Vitamin D deficiency 5/5/2017           PAST SURGICAL HISTORY     Past Surgical History:   Procedure Laterality Date    CARDIAC SURG PROCEDURE UNLIST      HX ORTHOPAEDIC      L hip replacement, R shoulder replacement.  HX PACEMAKER      INS PPM/ICD LED DUAL ONLY  7/13/2015         INS PPM/ICD LED DUAL ONLY  3/1/2016               ALLERGIES     No Known Allergies       FAMILY HISTORY     No family history on file. negative for cardiac disease       SOCIAL HISTORY     Social History     Socioeconomic History    Marital status:      Spouse name: Not on file    Number of children: Not on file    Years of education: Not on file    Highest education level: Not on file   Tobacco Use    Smoking status: Never Smoker    Smokeless tobacco: Never Used   Substance and Sexual Activity    Alcohol use: No    Drug use: No         MEDICATIONS     Current Outpatient Medications   Medication Sig    nitroglycerin (NITROSTAT) 0.4 mg SL tablet 1 Tab by SubLINGual route every five (5) minutes as needed for Chest Pain.  apixaban (ELIQUIS) 5 mg tablet Take 1 Tab by mouth two (2) times a day.  carvedilol (COREG) 12.5 mg tablet Take 1 Tab by mouth two (2) times daily (with meals).  aspirin delayed-release 81 mg tablet Take 1 Tab by mouth daily.  cholecalciferol, VITAMIN D3, (VITAMIN D3) 5,000 unit tab tablet Take  by mouth daily.  multivitamin (ONE A DAY) tablet Take 1 Tab by mouth daily.  coenzyme q10-vitamin e (COQ10 ) 100-100 mg-unit cap Take 1 Tab by mouth daily.  carbidopa-levodopa (SINEMET)  mg per tablet Take 2 Tabs by mouth three (3) times daily. No current facility-administered medications for this visit. I have reviewed the nurses notes, vitals, problem list, allergy list, medical history, family, social history and medications. REVIEW OF SYMPTOMS      General: Pt denies excessive weight gain or loss. Pt is able to conduct ADL's  HEENT: Denies blurred vision, headaches, hearing loss, epistaxis and difficulty swallowing. Respiratory: Denies cough, congestion, SOTELO, wheezing or stridor. Positive for SOB. Cardiovascular: Denies precordial pain, palpitations, edema or PND. Positive for chest discomfort. Gastrointestinal: Denies poor appetite, indigestion, abdominal pain or blood in stool  Genitourinary: Denies hematuria, dysuria, increased urinary frequency  Musculoskeletal: Denies joint pain or swelling from muscles or joints  Neurologic: Denies tremor, paresthesias, headache, or sensory motor disturbance  Psychiatric: Denies confusion, insomnia, depression  Integumentray: Denies rash, itching or ulcers. Hematologic: Denies easy bruising, bleeding     PHYSICAL EXAMINATION      Vitals:    03/05/19 1009   BP: 130/70   Pulse: 80   Weight: 134 lb 9.6 oz (61.1 kg)   Height: 5' 4\" (1.626 m)     General: Well developed, in no acute distress. HEENT: No jaundice, oral mucosa moist, no oral ulcers  Neck: Supple, no stiffness, no lymphadenopathy, supple  Heart:  Normal S1/S2 negative S3 or S4. Regular, no murmur, gallop or rub, no jugular venous distention  Respiratory: Clear bilaterally x 4, no wheezing or rales  Abdomen:   Soft, non-tender, bowel sounds are active.   Extremities:  No edema, normal cap refill, no cyanosis. Musculoskeletal: No clubbing, no deformities  Neuro: A&Ox3, speech clear, gait stable, cooperative, no focal neurologic deficits  Skin: Skin color is normal. No rashes or lesions. Non diaphoretic, moist.  Vascular: 2+ pulses symmetric in all extremities     DIAGNOSTIC DATA     1. Echo  7/11/15 - EF 35%, basilar septum AK, inferior wall and inferobasilar wall. RV mild reduced function, mild AI, TR   10/13/15 - EF 15-20%, mild MR, moderate TR, mild to moderate CA  6/15/18-EF 30%, RVE, RACHID, MR mild, TR mod/severe, PA pressure 33 mmHg, AV trileaflet show increase thickness and sclerosis    2.  Cardiac catheterization  7/10/15 - severe native CAD w/ all proxs occluded, LAD after severly diseased first diagonal, patent moderately diseased SVG to OM 1, 2, SVG to RCA - Degenerated occluded - large clot burden in graft and diffuse distal graft and native RCA disease. Patent LIMA to LAD, LAD 60% distal to graft insertion, faint L to R collaterals. Given diffuse disease in culprit 23year old graft, PCI was not done. 3. Cholesterol   7/13/15 - TC 98, HDL 36, LDL 42.2, TG 99  4/30/18- , HDL 34, , 81    4. Pacemaker placed 7/13/15  The pacemaker is a Medtronic Bright BENITES, model #ADDR [id number], serial [id number], atrial lead medtronic [id number], serial [id number], ventricular lead Medtronic model [id number] and serial [id number]    5. PERMANENT AICD INSERTION   DATE OF PROCEDURE: 3/1/2016 - Upgrade of the dual chamber pacemaker to dual chamber per Dr. Hussain Orta Results   Component Value Date/Time    WBC 4.0 02/11/2016 10:44 AM    Hemoglobin (POC) 16.3 07/10/2015 08:24 PM    HGB 15.4 02/11/2016 10:44 AM    Hematocrit (POC) 48 07/10/2015 08:24 PM    HCT 47.0 02/11/2016 10:44 AM    PLATELET 194 (L) 18/98/8058 10:44 AM    MCV 91 02/11/2016 10:44 AM      Lab Results   Component Value Date/Time    Sodium 141 03/01/2016 11:19 AM    Potassium 4.0 03/01/2016 11:19 AM    Chloride 106 03/01/2016 11:19 AM    CO2 28 03/01/2016 11:19 AM    Anion gap 7 03/01/2016 11:19 AM    Glucose 84 03/01/2016 11:19 AM    BUN 22 (H) 03/01/2016 11:19 AM    Creatinine 1.09 03/01/2016 11:19 AM    BUN/Creatinine ratio 20 03/01/2016 11:19 AM    GFR est AA >60 03/01/2016 11:19 AM    GFR est non-AA >60 03/01/2016 11:19 AM    Calcium 9.0 03/01/2016 11:19 AM    Bilirubin, total 0.9 10/24/2017 01:56 PM    AST (SGOT) 25 10/24/2017 01:56 PM    Alk. phosphatase 110 10/24/2017 01:56 PM    Protein, total 6.9 10/24/2017 01:56 PM    Albumin 4.7 10/24/2017 01:56 PM    Globulin 2.9 11/25/2015 03:51 PM    A-G Ratio 1.6 11/25/2015 03:51 PM    ALT (SGPT) 7 10/24/2017 01:56 PM           ASSESSMENT/RECOMMENDATIONS:.      1. Chest discomfort/SOB/fatigue  - He does have hx of CAD and ICM.   His graft is 23years old I don't think there's a possibility of intervening these issues and he's concerned and asked for nitroglycerin. Will proceed with a lexiscan cardiolite. Continue risk factor modification. 2. Atrial flutter  - He's had some irregularly, heart rhythm however sounded normal on PE.  will try to interrogate his defibrillator today to see if there's any arrhythmia. 3. HF  - He's describing some SOB but this may be due to inactivity and deconditioning, he doesn't appear to have any volume overload by PE today. Unclear if SOB may be related to his myocardial ischemia so will perform a lexican cardiolite. .    4. Dyslipidemia  - Last LDL was 117. Goal should be 70 or below secondary to CAD. Followed by PCP. 5. Cardiomyopathy   - Is describing sx's of SOB and this may be NYHA I sx but no physical findings of failure. Will do the above mentioned test.    6. Return in 6 months or PRN. Orders Placed This Encounter    nitroglycerin (NITROSTAT) 0.4 mg SL tablet     Si Tab by SubLINGual route every five (5) minutes as needed for Chest Pain. Dispense:  1 Bottle     Refill:  5        Follow-up Disposition:  Return in about 6 months (around 2019). I have discussed the diagnosis with  Lisa Osborne and the intended plan as seen in the above orders. Questions were answered concerning future plans. I have discussed medication side effects and warnings with the patient as well. Thank you,  Alfredo Grijalva MD for involving me in the care of  Lisa Osborne. Please do not hesitate to contact me for further questions/concerns. Written by Yobany Montenegro, as dictated by Michelle Rhodes MD.     Bard Hillary MD, [facility]    Patient Care Team:  Alfredo Grijalva MD as PCP - General (Family Practice)  Manny Cheek MD as Physician (Cardiology)  Dru Chowdary MD as Physician (Cardiology)  Diane Zapata., RN as Ambulatory Care Navigator (Cardiology)  Clay Cabral MD (Orthopedic Surgery)  Monica Powell Vasile Stephen MD as Physician (General and Vascular Surgery)    [address]      [phone] / [phone] Fax

## 2019-03-11 RX ORDER — NITROGLYCERIN 0.4 MG/1
0.4 TABLET SUBLINGUAL
Qty: 1 BOTTLE | Refills: 5 | Status: SHIPPED | OUTPATIENT
Start: 2019-03-11

## 2019-04-25 ENCOUNTER — OFFICE VISIT (OUTPATIENT)
Dept: CARDIOLOGY CLINIC | Age: 79
End: 2019-04-25

## 2019-04-25 ENCOUNTER — TELEPHONE (OUTPATIENT)
Dept: CARDIOLOGY CLINIC | Age: 79
End: 2019-04-25

## 2019-04-25 DIAGNOSIS — Z95.810 PRESENCE OF AUTOMATIC CARDIOVERTER/DEFIBRILLATOR (AICD): Primary | ICD-10-CM

## 2019-04-25 NOTE — TELEPHONE ENCOUNTER
Patients daughter would like to r/s the remote check due to the pt being in the hospital   Phone: [phone]

## 2019-04-26 NOTE — TELEPHONE ENCOUNTER
Spoke to pts daughter & she said he doesn't have a ph line, Internet or good cell service for his Lat[employer]. He was also in hosp recently so I told her to try to send since they have the cell adapter. If it doesn't work then he will have to come in for appts. She will attempt to send this pm & I will let her know if it works.

## 2019-04-30 NOTE — TELEPHONE ENCOUNTER
Spoke to pts daughter & it still didn't work with cell adapter from his home. She will have him come to her house on the ian days & send from there.

## 2019-06-07 DIAGNOSIS — I25.10 CORONARY ARTERY DISEASE INVOLVING NATIVE CORONARY ARTERY OF NATIVE HEART WITHOUT ANGINA PECTORIS: ICD-10-CM

## 2019-06-26 ENCOUNTER — CLINICAL SUPPORT (OUTPATIENT)
Dept: CARDIOLOGY CLINIC | Age: 79
End: 2019-06-26

## 2019-06-26 DIAGNOSIS — Z95.810 CARDIAC DEFIBRILLATOR IN SITU: Primary | ICD-10-CM

## 2019-07-08 ENCOUNTER — TELEPHONE (OUTPATIENT)
Dept: CARDIOLOGY CLINIC | Age: 79
End: 2019-07-08

## 2019-10-01 ENCOUNTER — OFFICE VISIT (OUTPATIENT)
Dept: CARDIOLOGY CLINIC | Age: 79
End: 2019-10-01

## 2019-10-01 DIAGNOSIS — Z95.810 CARDIAC DEFIBRILLATOR IN SITU: Primary | ICD-10-CM

## 2019-10-23 DIAGNOSIS — I25.10 CORONARY ARTERY DISEASE INVOLVING NATIVE CORONARY ARTERY OF NATIVE HEART WITHOUT ANGINA PECTORIS: ICD-10-CM

## 2019-10-28 ENCOUNTER — APPOINTMENT (OUTPATIENT)
Dept: CT IMAGING | Age: 79
End: 2019-10-28
Attending: STUDENT IN AN ORGANIZED HEALTH CARE EDUCATION/TRAINING PROGRAM
Payer: MEDICARE

## 2019-10-28 ENCOUNTER — APPOINTMENT (OUTPATIENT)
Dept: CT IMAGING | Age: 79
End: 2019-10-28
Attending: PHYSICIAN ASSISTANT
Payer: MEDICARE

## 2019-10-28 ENCOUNTER — APPOINTMENT (OUTPATIENT)
Dept: GENERAL RADIOLOGY | Age: 79
End: 2019-10-28
Attending: PHYSICIAN ASSISTANT
Payer: MEDICARE

## 2019-10-28 ENCOUNTER — HOSPITAL ENCOUNTER (OUTPATIENT)
Age: 79
Setting detail: OBSERVATION
Discharge: HOME | End: 2019-10-30
Attending: STUDENT IN AN ORGANIZED HEALTH CARE EDUCATION/TRAINING PROGRAM | Admitting: FAMILY MEDICINE
Payer: MEDICARE

## 2019-10-28 DIAGNOSIS — R47.81 SLURRED SPEECH: Primary | ICD-10-CM

## 2019-10-28 DIAGNOSIS — G45.9 TIA (TRANSIENT ISCHEMIC ATTACK): ICD-10-CM

## 2019-10-28 LAB
ALBUMIN SERPL-MCNC: 4 G/DL (ref 3.5–5)
ALBUMIN/GLOB SERPL: 1.3 {RATIO} (ref 1.1–2.2)
ALP SERPL-CCNC: 135 U/L (ref 45–117)
ALT SERPL-CCNC: 13 U/L (ref 12–78)
ANION GAP SERPL CALC-SCNC: 3 MMOL/L (ref 5–15)
APPEARANCE UR: CLEAR
APTT PPP: 29.5 SEC (ref 22.1–32)
AST SERPL-CCNC: 40 U/L (ref 15–37)
BACTERIA URNS QL MICRO: NEGATIVE /HPF
BASOPHILS # BLD: 0 K/UL (ref 0–0.1)
BASOPHILS NFR BLD: 1 % (ref 0–1)
BILIRUB SERPL-MCNC: 0.8 MG/DL (ref 0.2–1)
BILIRUB UR QL: NEGATIVE
BUN SERPL-MCNC: 18 MG/DL (ref 6–20)
BUN/CREAT SERPL: 17 (ref 12–20)
CALCIUM SERPL-MCNC: 9.2 MG/DL (ref 8.5–10.1)
CHLORIDE SERPL-SCNC: 110 MMOL/L (ref 97–108)
CO2 SERPL-SCNC: 30 MMOL/L (ref 21–32)
COLOR UR: ABNORMAL
CREAT SERPL-MCNC: 1.03 MG/DL (ref 0.7–1.3)
DIFFERENTIAL METHOD BLD: ABNORMAL
EOSINOPHIL # BLD: 0.4 K/UL (ref 0–0.4)
EOSINOPHIL NFR BLD: 12 % (ref 0–7)
EPITH CASTS URNS QL MICRO: ABNORMAL /LPF
ERYTHROCYTE [DISTWIDTH] IN BLOOD BY AUTOMATED COUNT: 15.2 % (ref 11.5–14.5)
GLOBULIN SER CALC-MCNC: 3.2 G/DL (ref 2–4)
GLUCOSE BLD STRIP.AUTO-MCNC: 82 MG/DL (ref 65–100)
GLUCOSE SERPL-MCNC: 90 MG/DL (ref 65–100)
GLUCOSE UR STRIP.AUTO-MCNC: NEGATIVE MG/DL
HCT VFR BLD AUTO: 43.5 % (ref 36.6–50.3)
HGB BLD-MCNC: 14.1 G/DL (ref 12.1–17)
HGB UR QL STRIP: NEGATIVE
HYALINE CASTS URNS QL MICRO: ABNORMAL /LPF (ref 0–5)
IMM GRANULOCYTES # BLD AUTO: 0 K/UL (ref 0–0.04)
IMM GRANULOCYTES NFR BLD AUTO: 0 % (ref 0–0.5)
INR PPP: 1.4 (ref 0.9–1.1)
KETONES UR QL STRIP.AUTO: NEGATIVE MG/DL
LEUKOCYTE ESTERASE UR QL STRIP.AUTO: NEGATIVE
LYMPHOCYTES # BLD: 0.7 K/UL (ref 0.8–3.5)
LYMPHOCYTES NFR BLD: 23 % (ref 12–49)
MCH RBC QN AUTO: 31.5 PG (ref 26–34)
MCHC RBC AUTO-ENTMCNC: 32.4 G/DL (ref 30–36.5)
MCV RBC AUTO: 97.1 FL (ref 80–99)
MONOCYTES # BLD: 0.4 K/UL (ref 0–1)
MONOCYTES NFR BLD: 12 % (ref 5–13)
NEUTS SEG # BLD: 1.7 K/UL (ref 1.8–8)
NEUTS SEG NFR BLD: 52 % (ref 32–75)
NITRITE UR QL STRIP.AUTO: NEGATIVE
NRBC # BLD: 0 K/UL (ref 0–0.01)
NRBC BLD-RTO: 0 PER 100 WBC
PH UR STRIP: 5.5 [PH] (ref 5–8)
PLATELET # BLD AUTO: 66 K/UL (ref 150–400)
PMV BLD AUTO: 11.7 FL (ref 8.9–12.9)
POTASSIUM SERPL-SCNC: 4.6 MMOL/L (ref 3.5–5.1)
PROT SERPL-MCNC: 7.2 G/DL (ref 6.4–8.2)
PROT UR STRIP-MCNC: ABNORMAL MG/DL
PROTHROMBIN TIME: 13.9 SEC (ref 9–11.1)
RBC # BLD AUTO: 4.48 M/UL (ref 4.1–5.7)
RBC #/AREA URNS HPF: ABNORMAL /HPF (ref 0–5)
RBC MORPH BLD: ABNORMAL
SERVICE CMNT-IMP: NORMAL
SODIUM SERPL-SCNC: 143 MMOL/L (ref 136–145)
SP GR UR REFRACTOMETRY: 1.02 (ref 1–1.03)
THERAPEUTIC RANGE,PTTT: NORMAL SECS (ref 58–77)
TROPONIN I SERPL-MCNC: <0.05 NG/ML
UROBILINOGEN UR QL STRIP.AUTO: 0.2 EU/DL (ref 0.2–1)
WBC # BLD AUTO: 3.2 K/UL (ref 4.1–11.1)
WBC URNS QL MICRO: ABNORMAL /HPF (ref 0–4)

## 2019-10-28 PROCEDURE — 85730 THROMBOPLASTIN TIME PARTIAL: CPT

## 2019-10-28 PROCEDURE — 71045 X-RAY EXAM CHEST 1 VIEW: CPT

## 2019-10-28 PROCEDURE — 36415 COLL VENOUS BLD VENIPUNCTURE: CPT

## 2019-10-28 PROCEDURE — 99285 EMERGENCY DEPT VISIT HI MDM: CPT

## 2019-10-28 PROCEDURE — 85610 PROTHROMBIN TIME: CPT

## 2019-10-28 PROCEDURE — 80053 COMPREHEN METABOLIC PANEL: CPT

## 2019-10-28 PROCEDURE — 70450 CT HEAD/BRAIN W/O DYE: CPT

## 2019-10-28 PROCEDURE — 85025 COMPLETE CBC W/AUTO DIFF WBC: CPT

## 2019-10-28 PROCEDURE — 82962 GLUCOSE BLOOD TEST: CPT

## 2019-10-28 PROCEDURE — 93005 ELECTROCARDIOGRAM TRACING: CPT

## 2019-10-28 PROCEDURE — 99218 HC RM OBSERVATION: CPT

## 2019-10-28 PROCEDURE — 81001 URINALYSIS AUTO W/SCOPE: CPT

## 2019-10-28 PROCEDURE — 74176 CT ABD & PELVIS W/O CONTRAST: CPT

## 2019-10-28 PROCEDURE — 84484 ASSAY OF TROPONIN QUANT: CPT

## 2019-10-28 RX ORDER — SODIUM CHLORIDE 9 MG/ML
75 INJECTION, SOLUTION INTRAVENOUS CONTINUOUS
Status: DISPENSED | OUTPATIENT
Start: 2019-10-28 | End: 2019-10-29

## 2019-10-28 RX ORDER — ROSUVASTATIN CALCIUM 20 MG/1
20 TABLET, COATED ORAL
COMMUNITY

## 2019-10-28 RX ORDER — DONEPEZIL HYDROCHLORIDE 5 MG/1
5 TABLET, FILM COATED ORAL
COMMUNITY
End: 2019-11-04

## 2019-10-28 NOTE — ED PROVIDER NOTES
Pt c/o slurred speech that began approximately 30 minutes ago, noticed by family. Pt reports he noticed slurred speech intermittently for the past few days. Pt also c/o generalized weakness and abdominal pain. Pt is anticoagulated on Eliquis. Pt denies headache or numbness/tingling. PMH of parkinson's disease and MI. I have evaluated the patient as the Provider in Triage. I have reviewed His vital signs and the triage nurse assessment. I have talked with the patient and any available family and advised that I am the provider in triage and have ordered the appropriate study to initiate their work up based on the clinical presentation during my assessment. I have advised that the patient will be accommodated in the Main ED as soon as possible. I have also requested to contact the triage nurse or myself immediately if the patient experiences any changes in their condition during this brief waiting period. Note written by Ronaldo Marin, as dictated by ERIK Hawkins 6:17 PM        Patient seen by Nakul Becerra DO in [address] [facility]  78-year-old male history of Parkinson's and coronary disease presenting to the emergency department today secondary to slurred speech. Family reports that approximately 30 minutes prior to arrival they noticed that he was having slurred speech. By the time that I evaluated him he was starting to improve and by the time that neurology evaluated him it is completely resolved. Patient reports that he has had this intermittently for the past week and it lasts about 2 hours at a time. He also reports feeling overall poorly with generalized fatigue. He denies any headache or neck pain. No chest pain or shortness of breath. No focal weakness or numbness in his arms or legs. No confusion. He does use anticoagulation for atrial fibrillation.            Past Medical History:   Diagnosis Date    Ill-defined condition     MI    [facility]'s disease [facility]     Vitamin D deficiency 5/5/2017       Past Surgical History:   Procedure Laterality Date    CARDIAC SURG PROCEDURE UNLIST      HX ORTHOPAEDIC      L hip replacement, R shoulder replacement.  HX PACEMAKER      INS PPM/ICD LED DUAL ONLY  7/13/2015         INS PPM/ICD LED DUAL ONLY  3/1/2016              No family history on file. Social History     Socioeconomic History    Marital status:      Spouse name: Not on file    Number of children: Not on file    Years of education: Not on file    Highest education level: Not on file   Occupational History    Not on file   Social Needs    Financial resource strain: Not on file    Food insecurity:     Worry: Not on file     Inability: Not on file    Transportation needs:     Medical: Not on file     Non-medical: Not on file   Tobacco Use    Smoking status: Never Smoker    Smokeless tobacco: Never Used   Substance and Sexual Activity    Alcohol use: No    Drug use: No    Sexual activity: Not on file   Lifestyle    Physical activity:     Days per week: Not on file     Minutes per session: Not on file    Stress: Not on file   Relationships    Social connections:     Talks on phone: Not on file     Gets together: Not on file     Attends Christian service: Not on file     Active member of club or organization: Not on file     Attends meetings of clubs or organizations: Not on file     Relationship status: Not on file    Intimate partner violence:     Fear of current or ex partner: Not on file     Emotionally abused: Not on file     Physically abused: Not on file     Forced sexual activity: Not on file   Other Topics Concern    Not on file   Social History Narrative    Not on file         ALLERGIES: Patient has no known allergies. Review of Systems   Constitutional: Negative for chills and fever. HENT: Negative for congestion and sore throat. Eyes: Negative for pain and redness. Respiratory: Negative for cough and shortness of breath. Cardiovascular: Negative for chest pain and palpitations. Gastrointestinal: Negative for abdominal pain, diarrhea, nausea and vomiting. Genitourinary: Negative for frequency and hematuria. Musculoskeletal: Negative for back pain and neck pain. Skin: Negative for rash and wound. Neurological: Negative for dizziness and headaches. Hematological: Does not bruise/bleed easily. Vitals:    10/28/19 1844 10/28/19 1845 10/28/19 1900 10/28/19 1915   BP:  155/86 136/72 139/68   Pulse:  61 64 63   Resp:  14 12 15   Temp:       SpO2: 99% 97%  99%   Weight:       Height:                Physical Exam   Constitutional: He is oriented to person, place, and time. He appears well-developed and well-nourished. No distress. HENT:   Head: Normocephalic and atraumatic. Mouth/Throat: Oropharynx is clear and moist.   Eyes: Pupils are equal, round, and reactive to light. Conjunctivae and EOM are normal.   Neck: Normal range of motion. Neck supple. Cardiovascular: Normal rate, regular rhythm and intact distal pulses. Exam reveals no gallop and no friction rub. Murmur heard. Pulmonary/Chest: Effort normal and breath sounds normal. No respiratory distress. He has no wheezes. He has no rales. Abdominal: Soft. Bowel sounds are normal. He exhibits no distension. There is no tenderness. There is no rebound and no guarding. Musculoskeletal: Normal range of motion. He exhibits no edema. Neurological: He is alert and oriented to person, place, and time. CN II-XII tested and intact  Mildly slurred speech  Tongue protrusion normal  5/5 b/l strength with shoulder/elbow flexion and extension  Equal  strength  5/5 b/l strength with hip extension, knee flexion/extension  Symmetric dorsi and plantar-flexion of feet  Sensation intact in face and throughout all 4 extremities  No dysmetria   No truncal ataxia      Skin: Skin is warm and dry. Capillary refill takes less than 2 seconds. No rash noted.    Psychiatric: He has a normal mood and affect. Nursing note and vitals reviewed. EKG Interpretation:   ED Physician interpretation  AV paced rhythm, rate 64, no criteria for STEMI      Labs Reviewed:   CBC: no significant leukocytosis or anemia   CMP: normal renal function, no significant electrolyte abnormality, glucose normal, LFT within normal limits  Trop: normal  Thrombocytopenic at 66        Imaging Reviewed:   CT head neg  CXR neg  CTA ab/p neg        Course:  Discussed with tele-neuro--not a tPA candidate, suspicious for TIA's, recommends admit    Discussed with Hospitalist Dr. Christopher Geronimo --will admit        MDM:  78 y.o. male here with intermittent slurred speech for past week. No other findings on my exam. NIHSS of 1 for slurred speech. CT neg. Also with fatigue and abd pain however workup negative for acute process such as appendicitis, SBO, diverticulitis, or renal stone. Labs overall unremarkable except for thrombocytopenia. Tele-neuro saw patient and recommends admit for TIA workup. Clinical Impression:     ICD-10-CM ICD-9-CM    1. Slurred speech R47.81 784.59    2.  TIA (transient ischemic attack) G45.9 435.9            Disposition: Admit  Zoltan Lopez DO

## 2019-10-28 NOTE — ED NOTES
Bedside and Verbal shift change report given to Susan  (oncoming nurse) by John Dangelo (offgoing nurse). Report included the following information SBAR, Kardex, ED Summary, Procedure Summary, Intake/Output, MAR, Accordion and Recent Results.

## 2019-10-28 NOTE — ED TRIAGE NOTES
Slurred speech noticed by family approx 30 mins ago, but pt admits to noticing it for a few days but attempted to hide it. Endorses fatigue and abdominal pain x 1 week. Pt has tremors at baseline 2/2 Parkinson's.

## 2019-10-29 LAB
ATRIAL RATE: 64 BPM
CALCULATED P AXIS, ECG09: 10 DEGREES
CALCULATED R AXIS, ECG10: -61 DEGREES
CALCULATED T AXIS, ECG11: 35 DEGREES
CHOLEST SERPL-MCNC: 99 MG/DL
DIAGNOSIS, 93000: NORMAL
EST. AVERAGE GLUCOSE BLD GHB EST-MCNC: 123 MG/DL
HBA1C MFR BLD: 5.9 % (ref 4.2–6.3)
HDLC SERPL-MCNC: 31 MG/DL
HDLC SERPL: 3.2 {RATIO} (ref 0–5)
LDLC SERPL CALC-MCNC: 54.6 MG/DL (ref 0–100)
LIPID PROFILE,FLP: NORMAL
P-R INTERVAL, ECG05: 280 MS
Q-T INTERVAL, ECG07: 496 MS
QRS DURATION, ECG06: 156 MS
QTC CALCULATION (BEZET), ECG08: 511 MS
TRIGL SERPL-MCNC: 67 MG/DL (ref ?–150)
VENTRICULAR RATE, ECG03: 64 BPM
VLDLC SERPL CALC-MCNC: 13.4 MG/DL

## 2019-10-29 PROCEDURE — 97535 SELF CARE MNGMENT TRAINING: CPT

## 2019-10-29 PROCEDURE — 97161 PT EVAL LOW COMPLEX 20 MIN: CPT

## 2019-10-29 PROCEDURE — 36415 COLL VENOUS BLD VENIPUNCTURE: CPT

## 2019-10-29 PROCEDURE — 80061 LIPID PANEL: CPT

## 2019-10-29 PROCEDURE — 92610 EVALUATE SWALLOWING FUNCTION: CPT

## 2019-10-29 PROCEDURE — 99218 HC RM OBSERVATION: CPT

## 2019-10-29 PROCEDURE — 83036 HEMOGLOBIN GLYCOSYLATED A1C: CPT

## 2019-10-29 PROCEDURE — 97165 OT EVAL LOW COMPLEX 30 MIN: CPT

## 2019-10-29 PROCEDURE — 74011250636 HC RX REV CODE- 250/636: Performed by: FAMILY MEDICINE

## 2019-10-29 PROCEDURE — 97116 GAIT TRAINING THERAPY: CPT

## 2019-10-29 PROCEDURE — 74011250637 HC RX REV CODE- 250/637: Performed by: FAMILY MEDICINE

## 2019-10-29 RX ORDER — DONEPEZIL HYDROCHLORIDE 5 MG/1
5 TABLET, FILM COATED ORAL
Status: DISCONTINUED | OUTPATIENT
Start: 2019-10-29 | End: 2019-10-29

## 2019-10-29 RX ORDER — CARBIDOPA AND LEVODOPA 25; 100 MG/1; MG/1
2 TABLET ORAL 3 TIMES DAILY
Status: DISCONTINUED | OUTPATIENT
Start: 2019-10-29 | End: 2019-10-30 | Stop reason: HOSPADM

## 2019-10-29 RX ORDER — CHOLECALCIFEROL TAB 125 MCG (5000 UNIT) 125 MCG
5000 TAB ORAL DAILY
Status: DISCONTINUED | OUTPATIENT
Start: 2019-10-29 | End: 2019-10-29

## 2019-10-29 RX ORDER — ASPIRIN 81 MG/1
81 TABLET ORAL DAILY
Status: DISCONTINUED | OUTPATIENT
Start: 2019-10-29 | End: 2019-10-30 | Stop reason: HOSPADM

## 2019-10-29 RX ORDER — ROSUVASTATIN CALCIUM 10 MG/1
20 TABLET, COATED ORAL
Status: DISCONTINUED | OUTPATIENT
Start: 2019-10-29 | End: 2019-10-30 | Stop reason: HOSPADM

## 2019-10-29 RX ORDER — CARVEDILOL 6.25 MG/1
6.25 TABLET ORAL 2 TIMES DAILY WITH MEALS
COMMUNITY
End: 2021-10-20

## 2019-10-29 RX ORDER — CARVEDILOL 6.25 MG/1
6.25 TABLET ORAL 2 TIMES DAILY WITH MEALS
Status: DISCONTINUED | OUTPATIENT
Start: 2019-10-30 | End: 2019-10-30 | Stop reason: HOSPADM

## 2019-10-29 RX ORDER — CARVEDILOL 12.5 MG/1
12.5 TABLET ORAL 2 TIMES DAILY WITH MEALS
Status: DISCONTINUED | OUTPATIENT
Start: 2019-10-29 | End: 2019-10-29

## 2019-10-29 RX ADMIN — CARBIDOPA AND LEVODOPA 2 TABLET: 25; 100 TABLET ORAL at 15:28

## 2019-10-29 RX ADMIN — CARBIDOPA AND LEVODOPA 2 TABLET: 25; 100 TABLET ORAL at 08:28

## 2019-10-29 RX ADMIN — APIXABAN 5 MG: 5 TABLET, FILM COATED ORAL at 18:10

## 2019-10-29 RX ADMIN — SODIUM CHLORIDE 75 ML/HR: 900 INJECTION, SOLUTION INTRAVENOUS at 18:30

## 2019-10-29 RX ADMIN — CARBIDOPA AND LEVODOPA 2 TABLET: 25; 100 TABLET ORAL at 02:02

## 2019-10-29 RX ADMIN — ROSUVASTATIN CALCIUM 20 MG: 10 TABLET, COATED ORAL at 21:48

## 2019-10-29 RX ADMIN — APIXABAN 5 MG: 5 TABLET, FILM COATED ORAL at 08:28

## 2019-10-29 RX ADMIN — CARBIDOPA AND LEVODOPA 2 TABLET: 25; 100 TABLET ORAL at 21:47

## 2019-10-29 RX ADMIN — SODIUM CHLORIDE 75 ML/HR: 900 INJECTION, SOLUTION INTRAVENOUS at 14:13

## 2019-10-29 RX ADMIN — DONEPEZIL HYDROCHLORIDE 5 MG: 5 TABLET, FILM COATED ORAL at 02:02

## 2019-10-29 RX ADMIN — ASPIRIN 81 MG: 81 TABLET, COATED ORAL at 08:28

## 2019-10-29 RX ADMIN — ROSUVASTATIN CALCIUM 20 MG: 10 TABLET, COATED ORAL at 02:02

## 2019-10-29 RX ADMIN — SODIUM CHLORIDE 75 ML/HR: 900 INJECTION, SOLUTION INTRAVENOUS at 00:10

## 2019-10-29 NOTE — PROGRESS NOTES
I called and sign-out patient to on call attending physician Dr. Clara Collins. I will sign off this a.m.

## 2019-10-29 NOTE — PROGRESS NOTES
Bedside and Verbal shift change report given to Nabor 64 (oncoming nurse) by Anat Brock (offgoing nurse). Report included the following information SBAR, Kardex, Procedure Summary, Intake/Output and MAR.     0830- Off going nurse Awilda stated that patient passed bedside stand evaluation. Patient given AM medication, tolerated well. TRANSFER - OUT REPORT:    Verbal report given to Patria(name) on [address]  being transferred to [facility](unit) for routine progression of care       Report consisted of patients Situation, Background, Assessment and   Recommendations(SBAR). Information from the following report(s) SBAR, Kardex, Procedure Summary, Intake/Output and MAR was reviewed with the receiving nurse. Lines:   Peripheral IV 10/28/19 Left Arm (Active)   Site Assessment Clean, dry, & intact 10/29/2019  3:14 PM   Phlebitis Assessment 0 10/29/2019  3:14 PM   Infiltration Assessment 0 10/29/2019  3:14 PM   Dressing Status Clean, dry, & intact 10/29/2019  3:14 PM   Dressing Type Transparent 10/29/2019  3:14 PM   Hub Color/Line Status Pink 10/29/2019  3:14 PM   Action Taken Open ports on tubing capped 10/29/2019  3:14 PM   Alcohol Cap Used Yes 10/29/2019  3:14 PM        Opportunity for questions and clarification was provided.       Patient transported with:   Registered Nurse

## 2019-10-29 NOTE — ED NOTES
Patient Throughput:  Charge nurse on [facility] made aware of patient's room assignment, room 318    Current MEWS score of 1          Cassandra Mike RN  Shift Resource Nurse  Emergency Department

## 2019-10-29 NOTE — PROGRESS NOTES
Daily Progress Note: 10/29/2019  Cherie Martinez MD    Assessment/Plan:   1. TIA (transient ischemic attack)- possible. -  Admit to telemetry; place on observation status       -  Order neurovascular checks and fall precautions       -  Consult neurologist        -  Order 2d echocardiogram       -  Continue Aspirin     2. Slurred speech        -  Patient reportedly passed dysphagia screen. -  Resume home medications     3.  Generalized abdominal pain       -  CT abdomen/ pelvis was unremarkable. Patient has chronic left inguinal hernia. May follow up as outpatient with general surgeon. - provide pain management prn while inpatient     4.  Long term anticoagulation. On Eliquis. Has h/o atrial flutter. -  Continue Eliquis     5.  Parkinson's disease       - continue Sinemet     6. Hyperlipidemia       -  Continue statin therapy       -  Check lipid panel     7. VTE prophylaxis       -  Already on Eliquis     8. History of dementia. Per family report.   Albeit at current, patient is A&OX3.       -  Continue Aricept     Code status:  Full code        Problem List:  Problem List as of 10/29/2019 Date Reviewed: 3/5/2019          Codes Class Noted - Resolved    TIA (transient ischemic attack) ICD-10-CM: G45.9  ICD-9-CM: 435.9  10/28/2019 - Present        Slurred speech ICD-10-CM: R47.81  ICD-9-CM: 784.59  10/28/2019 - Present        Vitamin D deficiency ICD-10-CM: E55.9  ICD-9-CM: 268.9  5/5/2017 - Present        Atypical atrial flutter ([facility] [address] ICD-10-CM: I48.4  ICD-9-CM: 427.32  5/5/2017 - Present    Overview Signed 5/5/2017 11:18 AM by Sofia PRO     S/p MI 7/15             S/P ICD (internal cardiac defibrillator) procedure ICD-10-CM: Z95.810  ICD-9-CM: V45.02  3/1/2016 - Present    Overview Signed 3/1/2016  3:05 PM by Tai Keane MD     Dual chamber [employer] mini ICD implant with DFT < 26 J 3/1/2016             Ischemic cardiomyopathy ICD-10-CM: I25.5  ICD-9-CM: 414.8  11/11/2015 - Present        Medication intolerance ICD-10-CM: Z78.9  ICD-9-CM: 995.27  7/29/2015 - Present        CAD (coronary artery disease) ICD-10-CM: I25.10  ICD-9-CM: 414.00  7/10/2015 - Present    Overview Signed 7/10/2015  9:59 PM by Lauren Mckeon MD     cabg times 3 chip 6/96, with subsequent stents lcx. Parkinson's disease ([facility] ICD-10-CM: G20  ICD-9-CM: 332.0  7/10/2015 - Present        Hyperlipidemia ICD-10-CM: E78.5  ICD-9-CM: 272.4  7/10/2015 - Present        STEMI (ST elevation myocardial infarction) ([facility] ICD-10-CM: I21.3  ICD-9-CM: 410.90  7/10/2015 - Present        Heart block ICD-10-CM: I45.9  ICD-9-CM: 426.9  7/10/2015 - Present        Abdominal aortic aneurysm [facility]) ICD-10-CM: I71.4  ICD-9-CM: 441.4  7/10/2015 - Present    Overview Signed 7/10/2015 10:16 PM by Lauren Mckeon MD     Endograft, details unknown. Subjective:     78 y.o. white male with past medical history of Parkinson's disease, MI, vitamin D deficiency, dementia presented to the ED from home with chief complaint of slurred speech and abdominal pain. Primarily however, patient's daughter and son-in-law brought patient to the ED tonight after they notices that the patient's speech was slurred. When asked, the patient notes that his speech has been slurred for ~ the past 2 weeks. Symptoms remained constant, severe, without specific aggravating or alleviating factors. He reports no history of strokes of similar symptom in the past.  He reportedly was recently started on Aricept for dementia. He denies any recent head/ neck trauma or falls. Patient admits to having abdominal located in bilateral lower quadrants. He reportedly has a chronic large left inguinal hernia with chronic LLQ pain but his RLQ pain was notedly new. Pain now is rated as 0/10.  There were no reports of new onset syncope, loss of consciousness, headache, neck pain, visual disturbance, numbness, paresthesias, focal weakness, chest pain, palpitations, nausea, vomiting, diarrhea, melena, dysuria, hematuria, calf pain, increased leg swelling/ edema, fever, chills, or rash. (Dr Moni Shoemaker)    10/29:  He reports sx have resolved. No current complaints except his tremor - did not get AM dose of Sinemet yet. Neuro to see today. Some results still pending. Unable to get MRI due to pacer. Review of Systems:   A comprehensive review of systems was negative except for that written in the HPI. Objective:   Physical Exam:     Visit Vitals  /63   Pulse 62   Temp 97.7 °F (36.5 °C)   Resp 16   Ht 5' 4\" (1.626 m)   Wt 62.1 kg (137 lb)   SpO2 94%   BMI 23.52 kg/m²      O2 Device: Room air    Temp (24hrs), Av.7 °F (36.5 °C), Min:97.5 °F (36.4 °C), Max:98 °F (36.7 °C)    10/29 0701 - 10/29 1900  In: -   Out: 200 [Urine:200]   10/27 1901 - 10/29 0700  In: 500 [I.V.:500]  Out: -     General:  Alert, cooperative, no distress, appears stated age. Head:  Normocephalic, without obvious abnormality, atraumatic. Eyes:  Conjunctivae/corneas clear. EOMs intact. Nose: Nares normal. Septum midline. Mucosa normal. No drainage or sinus tenderness. Throat: Lips, mucosa, and tongue moist..   Neck: Supple, symmetrical, trachea midline, no adenopathy, thyroid: no enlargement/tenderness/nodules, no carotid bruit and no JVD. Back:   Symmetric, no curvature. ROM normal. No CVA tenderness. Lungs:   Clear to auscultation bilaterally. Chest wall:  No tenderness or deformity. Heart:  Regular rate and rhythm, S1, S2 normal, no murmur, click, rub or gallop. Abdomen:   Soft, non-tender. Bowel sounds normal. No masses,  No organomegaly. Extremities: no cyanosis or edema. No calf tenderness or cords. Pulses: 2+ and symmetric all extremities. Skin: , turgor normal. No rashes    Neurologic:   Alert and oriented X 3. Tremor of both hands, semi pill rolling. Perhaps a slight facial mask. Slowed motor overall. Speech normal for him - sl slow but no slurring.  equal.  No cogwheeling or rigidity. Gait not tested at this time. Sensation grossly normal to touch. Gross motor of extremities normal.       Data Review:       Recent Days:  Recent Labs     10/28/19  1840   WBC 3.2*   HGB 14.1   HCT 43.5   PLT 66*     Recent Labs     10/28/19  1840      K 4.6   *   CO2 30   GLU 90   BUN 18   CREA 1.03   CA 9.2   ALB 4.0   TBILI 0.8   SGOT 40*   ALT 13   INR 1.4*     No results for input(s): PH, PCO2, PO2, HCO3, FIO2 in the last 72 hours. 24 Hour Results:  Recent Results (from the past 24 hour(s))   GLUCOSE, POC    Collection Time: 10/28/19  6:26 PM   Result Value Ref Range    Glucose (POC) 82 65 - 100 mg/dL    Performed by Kourtney Guerrero    CBC WITH AUTOMATED DIFF    Collection Time: 10/28/19  6:40 PM   Result Value Ref Range    WBC 3.2 (L) 4.1 - 11.1 K/uL    RBC 4.48 4.10 - 5.70 M/uL    HGB 14.1 12.1 - 17.0 g/dL    HCT 43.5 36.6 - 50.3 %    MCV 97.1 80.0 - 99.0 FL    MCH 31.5 26.0 - 34.0 PG    MCHC 32.4 30.0 - 36.5 g/dL    RDW 15.2 (H) 11.5 - 14.5 %    PLATELET 66 (L) 370 - 400 K/uL    MPV 11.7 8.9 - 12.9 FL    NRBC 0.0 0  WBC    ABSOLUTE NRBC 0.00 0.00 - 0.01 K/uL    NEUTROPHILS 52 32 - 75 %    LYMPHOCYTES 23 12 - 49 %    MONOCYTES 12 5 - 13 %    EOSINOPHILS 12 (H) 0 - 7 %    BASOPHILS 1 0 - 1 %    IMMATURE GRANULOCYTES 0 0.0 - 0.5 %    ABS. NEUTROPHILS 1.7 (L) 1.8 - 8.0 K/UL    ABS. LYMPHOCYTES 0.7 (L) 0.8 - 3.5 K/UL    ABS. MONOCYTES 0.4 0.0 - 1.0 K/UL    ABS. EOSINOPHILS 0.4 0.0 - 0.4 K/UL    ABS. BASOPHILS 0.0 0.0 - 0.1 K/UL    ABS. IMM.  GRANS. 0.0 0.00 - 0.04 K/UL    DF SMEAR SCANNED      RBC COMMENTS NORMOCYTIC, NORMOCHROMIC     METABOLIC PANEL, COMPREHENSIVE    Collection Time: 10/28/19  6:40 PM   Result Value Ref Range    Sodium 143 136 - 145 mmol/L    Potassium 4.6 3.5 - 5.1 mmol/L    Chloride 110 (H) 97 - 108 mmol/L    CO2 30 21 - 32 mmol/L    Anion gap 3 (L) 5 - 15 mmol/L    Glucose 90 65 - 100 mg/dL    BUN 18 6 - 20 MG/DL    Creatinine 1.03 0.70 - 1.30 MG/DL    BUN/Creatinine ratio 17 12 - 20      GFR est AA >60 >60 ml/min/1.73m2    GFR est non-AA >60 >60 ml/min/1.73m2    Calcium 9.2 8.5 - 10.1 MG/DL    Bilirubin, total 0.8 0.2 - 1.0 MG/DL    ALT (SGPT) 13 12 - 78 U/L    AST (SGOT) 40 (H) 15 - 37 U/L    Alk. phosphatase 135 (H) 45 - 117 U/L    Protein, total 7.2 6.4 - 8.2 g/dL    Albumin 4.0 3.5 - 5.0 g/dL    Globulin 3.2 2.0 - 4.0 g/dL    A-G Ratio 1.3 1.1 - 2.2     PTT    Collection Time: 10/28/19  6:40 PM   Result Value Ref Range    aPTT 29.5 22.1 - 32.0 sec    aPTT, therapeutic range     58.0 - 77.0 SECS   PROTHROMBIN TIME + INR    Collection Time: 10/28/19  6:40 PM   Result Value Ref Range    INR 1.4 (H) 0.9 - 1.1      Prothrombin time 13.9 (H) 9.0 - 11.1 sec   TROPONIN I    Collection Time: 10/28/19  6:40 PM   Result Value Ref Range    Troponin-I, Qt. <0.05 <0.05 ng/mL   EKG, 12 LEAD, INITIAL    Collection Time: 10/28/19  6:43 PM   Result Value Ref Range    Ventricular Rate 64 BPM    Atrial Rate 64 BPM    P-R Interval 280 ms    QRS Duration 156 ms    Q-T Interval 496 ms    QTC Calculation (Bezet) 511 ms    Calculated P Axis 10 degrees    Calculated R Axis -61 degrees    Calculated T Axis 35 degrees    Diagnosis       AV dual-paced rhythm with prolonged AV conduction with frequent premature   ventricular complexes  Abnormal ECG  When compared with ECG of 25-NOV-2015 15:41,  premature ventricular complexes are now present  Vent.  rate has decreased BY   9 BPM  Confirmed by Gary HONEYCUTT, [facility] ([id number]) on 10/29/2019 9:37:44 AM     URINALYSIS W/ RFLX MICROSCOPIC    Collection Time: 10/28/19  9:31 PM   Result Value Ref Range    Color YELLOW/STRAW      Appearance CLEAR CLEAR      Specific gravity 1.018 1.003 - 1.030      pH (UA) 5.5 5.0 - 8.0      Protein TRACE (A) NEG mg/dL    Glucose NEGATIVE  NEG mg/dL    Ketone NEGATIVE  NEG mg/dL    Bilirubin NEGATIVE  NEG      Blood NEGATIVE NEG      Urobilinogen 0.2 0.2 - 1.0 EU/dL    Nitrites NEGATIVE  NEG      Leukocyte Esterase NEGATIVE  NEG      WBC 0-4 0 - 4 /hpf    RBC 0-5 0 - 5 /hpf    Epithelial cells FEW FEW /lpf    Bacteria NEGATIVE  NEG /hpf    Hyaline cast 0-2 0 - 5 /lpf   LIPID PANEL    Collection Time: 10/29/19  5:26 AM   Result Value Ref Range    LIPID PROFILE          Cholesterol, total 99 <200 MG/DL    Triglyceride 67 <150 MG/DL    HDL Cholesterol 31 MG/DL    LDL, calculated 54.6 0 - 100 MG/DL    VLDL, calculated 13.4 MG/DL    CHOL/HDL Ratio 3.2 0.0 - 5.0     HEMOGLOBIN A1C WITH EAG    Collection Time: 10/29/19  5:26 AM   Result Value Ref Range    Hemoglobin A1c 5.9 4.2 - 6.3 %    Est. average glucose 123 mg/dL       Medications reviewed  Current Facility-Administered Medications   Medication Dose Route Frequency    apixaban (ELIQUIS) tablet 5 mg  5 mg Oral BID    aspirin delayed-release tablet 81 mg  81 mg Oral DAILY    carbidopa-levodopa (SINEMET)  mg per tablet 2 Tab  2 Tab Oral TID    donepezil (ARICEPT) tablet 5 mg  5 mg Oral QHS    rosuvastatin (CRESTOR) tablet 20 mg  20 mg Oral QHS    0.9% sodium chloride infusion  75 mL/hr IntraVENous CONTINUOUS     Current Outpatient Medications   Medication Sig    donepezil (ARICEPT) 5 mg tablet Take 5 mg by mouth nightly.  rosuvastatin (CRESTOR) 20 mg tablet Take 20 mg by mouth nightly.  apixaban (ELIQUIS) 5 mg tablet Take 1 Tab by mouth two (2) times a day.  nitroglycerin (NITROSTAT) 0.4 mg SL tablet 1 Tab by SubLINGual route every five (5) minutes as needed for Chest Pain.  aspirin delayed-release 81 mg tablet Take 1 Tab by mouth daily.  multivitamin (ONE A DAY) tablet Take 1 Tab by mouth daily.  coenzyme q10-vitamin e (COQ10 ) 100-100 mg-unit cap Take 1 Tab by mouth daily.  carbidopa-levodopa (SINEMET)  mg per tablet Take 2 Tabs by mouth three (3) times daily.     carvedilol (COREG) 12.5 mg tablet Take 1 Tab by mouth two (2) times daily (with meals). Care Plan discussed with: Patient and Nurse  Total time spent with patient: 30 minutes.   Devin Pickett MD

## 2019-10-29 NOTE — CONSULTS
NEUROLOGY IN-PATIENT NEW CONSULTATION      10/29/2019    RE: Shannan Zavala         1940      REFERRED BY:  Zahra Garcia MD      CHIEF COMPLAINT:  This is Shannan Zavala is a 78 y.o. male  who had concerns including Aphasia. HPI:     Patient has history of Parkinson's for 5 yrs on Sinemet and Dementia for 1 yr on Aricept seeing neurologist Dr Lalo Phipps [facility]. Patient now comes in for change in speech described as \"slurred and deliberate\" and abdominal pain. (-) weakness  (-) numbness  (-) dysphagia    ROS   (-) fever  (-) rash  All other systems reviewed and are negative    Past Medical Hx  Past Medical History:   Diagnosis Date    Ill-defined condition     MI    Parkinson's disease ([facility] [address]     Vitamin D deficiency 5/5/2017       Social Hx  Social History     Socioeconomic History    Marital status:      Spouse name: Not on file    Number of children: Not on file    Years of education: Not on file    Highest education level: Not on file   Tobacco Use    Smoking status: Never Smoker    Smokeless tobacco: Never Used   Substance and Sexual Activity    Alcohol use: No    Drug use: No       Family Hx  No family history on file.     ALLERGIES  No Known Allergies    CURRENT MEDS  Current Facility-Administered Medications   Medication Dose Route Frequency Provider Last Rate Last Dose    apixaban (ELIQUIS) tablet 5 mg  5 mg Oral BID Ximena Davis MD   5 mg at 10/29/19 5238    aspirin delayed-release tablet 81 mg  81 mg Oral DAILY Ximena Davis MD   81 mg at 10/29/19 5341    carbidopa-levodopa (SINEMET)  mg per tablet 2 Tab  2 Tab Oral TID Ximena Davis MD   2 Tab at 10/29/19 6748    donepezil (ARICEPT) tablet 5 mg  5 mg Oral QHS Ximena Davis MD   5 mg at 10/29/19 0202    rosuvastatin (CRESTOR) tablet 20 mg  20 mg Oral QHS Ximena Davis MD   20 mg at 10/29/19 0202    0.9% sodium chloride infusion  75 mL/hr IntraVENous CONTINUOUS Wang Greenwood MD CHARLES 75 mL/hr at 10/29/19 0010 75 mL/hr at 10/29/19 0010     Current Outpatient Medications   Medication Sig Dispense Refill    donepezil (ARICEPT) 5 mg tablet Take 5 mg by mouth nightly.  rosuvastatin (CRESTOR) 20 mg tablet Take 20 mg by mouth nightly.  apixaban (ELIQUIS) 5 mg tablet Take 1 Tab by mouth two (2) times a day. 60 Tab 0    nitroglycerin (NITROSTAT) 0.4 mg SL tablet 1 Tab by SubLINGual route every five (5) minutes as needed for Chest Pain. 1 Bottle 5    aspirin delayed-release 81 mg tablet Take 1 Tab by mouth daily. 90 Tab 3    multivitamin (ONE A DAY) tablet Take 1 Tab by mouth daily.  coenzyme q10-vitamin e (COQ10 ) 100-100 mg-unit cap Take 1 Tab by mouth daily.  carbidopa-levodopa (SINEMET)  mg per tablet Take 2 Tabs by mouth three (3) times daily.  carvedilol (COREG) 12.5 mg tablet Take 1 Tab by mouth two (2) times daily (with meals). 60 Tab 5           PREVIOUS WORKUP: (reviewed)  IMAGING:    CT Results (recent):  Results from Hospital Encounter encounter on 10/28/19   CT ABD PELV WO CONT    Narrative EXAM: CT ABD PELV WO CONT    INDICATION: rlq abd pain    COMPARISON: 2015    CONTRAST:  None. TECHNIQUE:   Thin axial images were obtained through the abdomen and pelvis. Coronal and  sagittal reconstructions were generated. Oral contrast was not administered. CT  dose reduction was achieved through use of a standardized protocol tailored for  this examination and automatic exposure control for dose modulation. The absence of intravenous contrast material reduces the sensitivity for  evaluation of the solid parenchymal organs of the abdomen. FINDINGS:   LUNG BASES: Clear. INCIDENTALLY IMAGED HEART AND MEDIASTINUM: Unremarkable. LIVER: No mass or biliary dilatation. GALLBLADDER: Unremarkable. SPLEEN: No mass. PANCREAS: No mass or ductal dilatation. ADRENALS: Unremarkable. KIDNEYS/URETERS: No mass, calculus, or hydronephrosis.   STOMACH: Unremarkable. SMALL BOWEL: No dilatation or wall thickening. COLON: No dilatation or wall thickening. Sigmoid diverticulosis of. APPENDIX: Unremarkable. PERITONEUM: No ascites or pneumoperitoneum. 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 Alex Antu REPRODUCTIVE ORGANS:  URINARY BLADDER: No mass or calculus. BONES: No destructive bone lesion. Bilateral hip prostheses. ADDITIONAL COMMENTS bowel containing left inguinal hernia. Impression IMPRESSION:  No acute findings       MRI Results (recent):  No results found for this or any previous visit. IR Results (recent):  No results found for this or any previous visit. VAS/US Results (recent):  Results from Hospital Encounter encounter on 05/10/13   DUPLEX CAROTID BILATERAL           LABS (reviewed)  Results for orders placed or performed during the hospital encounter of 10/28/19   CBC WITH AUTOMATED DIFF   Result Value Ref Range    WBC 3.2 (L) 4.1 - 11.1 K/uL    RBC 4.48 4.10 - 5.70 M/uL    HGB 14.1 12.1 - 17.0 g/dL    HCT 43.5 36.6 - 50.3 %    MCV 97.1 80.0 - 99.0 FL    MCH 31.5 26.0 - 34.0 PG    MCHC 32.4 30.0 - 36.5 g/dL    RDW 15.2 (H) 11.5 - 14.5 %    PLATELET 66 (L) 847 - 400 K/uL    MPV 11.7 8.9 - 12.9 FL    NRBC 0.0 0  WBC    ABSOLUTE NRBC 0.00 0.00 - 0.01 K/uL    NEUTROPHILS 52 32 - 75 %    LYMPHOCYTES 23 12 - 49 %    MONOCYTES 12 5 - 13 %    EOSINOPHILS 12 (H) 0 - 7 %    BASOPHILS 1 0 - 1 %    IMMATURE GRANULOCYTES 0 0.0 - 0.5 %    ABS. NEUTROPHILS 1.7 (L) 1.8 - 8.0 K/UL    ABS. LYMPHOCYTES 0.7 (L) 0.8 - 3.5 K/UL    ABS. MONOCYTES 0.4 0.0 - 1.0 K/UL    ABS. EOSINOPHILS 0.4 0.0 - 0.4 K/UL    ABS. BASOPHILS 0.0 0.0 - 0.1 K/UL    ABS. IMM.  GRANS. 0.0 0.00 - 0.04 K/UL    DF SMEAR SCANNED      RBC COMMENTS NORMOCYTIC, NORMOCHROMIC     METABOLIC PANEL, COMPREHENSIVE   Result Value Ref Range    Sodium 143 136 - 145 mmol/L    Potassium 4.6 3.5 - 5.1 mmol/L    Chloride 110 (H) 97 - 108 mmol/L    CO2 30 21 - 32 mmol/L    Anion gap 3 (L) 5 - 15 mmol/L    Glucose 90 65 - 100 mg/dL    BUN 18 6 - 20 MG/DL    Creatinine 1.03 0.70 - 1.30 MG/DL    BUN/Creatinine ratio 17 12 - 20      GFR est AA >60 >60 ml/min/1.73m2    GFR est non-AA >60 >60 ml/min/1.73m2    Calcium 9.2 8.5 - 10.1 MG/DL    Bilirubin, total 0.8 0.2 - 1.0 MG/DL    ALT (SGPT) 13 12 - 78 U/L    AST (SGOT) 40 (H) 15 - 37 U/L    Alk.  phosphatase 135 (H) 45 - 117 U/L    Protein, total 7.2 6.4 - 8.2 g/dL    Albumin 4.0 3.5 - 5.0 g/dL    Globulin 3.2 2.0 - 4.0 g/dL    A-G Ratio 1.3 1.1 - 2.2     PTT   Result Value Ref Range    aPTT 29.5 22.1 - 32.0 sec    aPTT, therapeutic range     58.0 - 77.0 SECS   PROTHROMBIN TIME + INR   Result Value Ref Range    INR 1.4 (H) 0.9 - 1.1      Prothrombin time 13.9 (H) 9.0 - 11.1 sec   TROPONIN I   Result Value Ref Range    Troponin-I, Qt. <0.05 <0.05 ng/mL   URINALYSIS W/ RFLX MICROSCOPIC   Result Value Ref Range    Color YELLOW/STRAW      Appearance CLEAR CLEAR      Specific gravity 1.018 1.003 - 1.030      pH (UA) 5.5 5.0 - 8.0      Protein TRACE (A) NEG mg/dL    Glucose NEGATIVE  NEG mg/dL    Ketone NEGATIVE  NEG mg/dL    Bilirubin NEGATIVE  NEG      Blood NEGATIVE  NEG      Urobilinogen 0.2 0.2 - 1.0 EU/dL    Nitrites NEGATIVE  NEG      Leukocyte Esterase NEGATIVE  NEG      WBC 0-4 0 - 4 /hpf    RBC 0-5 0 - 5 /hpf    Epithelial cells FEW FEW /lpf    Bacteria NEGATIVE  NEG /hpf    Hyaline cast 0-2 0 - 5 /lpf   LIPID PANEL   Result Value Ref Range    LIPID PROFILE          Cholesterol, total 99 <200 MG/DL    Triglyceride 67 <150 MG/DL    HDL Cholesterol 31 MG/DL    LDL, calculated 54.6 0 - 100 MG/DL    VLDL, calculated 13.4 MG/DL    CHOL/HDL Ratio 3.2 0.0 - 5.0     HEMOGLOBIN A1C WITH EAG   Result Value Ref Range    Hemoglobin A1c 5.9 4.2 - 6.3 %    Est. average glucose 123 mg/dL   GLUCOSE, POC   Result Value Ref Range    Glucose (POC) 82 65 - 100 mg/dL    Performed by Rakesh Mcneil    EKG, 12 LEAD, INITIAL Result Value Ref Range    Ventricular Rate 64 BPM    Atrial Rate 64 BPM    P-R Interval 280 ms    QRS Duration 156 ms    Q-T Interval 496 ms    QTC Calculation (Bezet) 511 ms    Calculated P Axis 10 degrees    Calculated R Axis -61 degrees    Calculated T Axis 35 degrees    Diagnosis       AV dual-paced rhythm with prolonged AV conduction with frequent premature   ventricular complexes  Abnormal ECG  When compared with ECG of 25-NOV-2015 15:41,  premature ventricular complexes are now present  Vent. rate has decreased BY   9 BPM  Confirmed by Gary HONEYCUTT, [place] ([id number]) on 10/29/2019 9:37:44 AM         Physical Exam:     Visit Vitals  /63   Pulse 62   Temp 97.7 °F (36.5 °C)   Resp 16   Ht 5' 4\" (1.626 m)   Wt 62.1 kg (137 lb)   SpO2 94%   BMI 23.52 kg/m²     General:  Alert, cooperative, no distress. Head:  Normocephalic, without obvious abnormality, atraumatic. Eyes:  Conjunctivae/corneas clear. Lungs:  Heart:   Non labored breathing  Regular rate and rhythm, no carotid bruits   Abdomen:   Soft, non-distended   Extremities: Extremities normal, atraumatic, no cyanosis or edema. Pulses: 2+ and symmetric all extremities. Skin: Skin color, texture, turgor normal. No rashes or lesions.   Neurologic Exam     Gen: Attention normal             Language: naming, repetition, slightly slow speech             Memory: intact recent and remote memory  Cranial Nerves:  I: smell Not tested   II: visual fields Full to confrontation   II: pupils Equal, round, reactive to light   II: optic disc No papilledema   III,VII: ptosis none   III,IV,VI: extraocular muscles  Full ROM   V: mastication normal   V: facial light touch sensation  normal   VII: facial muscle function   symmetric   VIII: hearing symmetric   IX: soft palate elevation  normal   XI: trapezius strength  5/5   XI: sternocleidomastoid strength 5/5   XI: neck flexion strength  5/5   XII: tongue  midline     Motor: normal bulk and tone, no tremor Strength: 5/5 all four extremities  (+) masked facies  (+) bilateral hand resting tremor  (+) bradykinesia  (-) rigidity  Sensory: intact to LT, PP, vibration, and temperature  Reflexes: 1+ throughout; Down going toes  Coordination: Good FTN and HTS  Gait: deferred           Impression:     Dana Russell is a 78 y.o. male who  has a past medical history of Ill-defined condition, Parkinson's disease ([address] and Vitamin D deficiency (5/5/2017). ICD who has history of Parkinson's for 5 yrs on Sinemet and Dementia for 1 yr on Aricept seeing neurologist Dr Nona Jackson at [facility]. Patient comes in for change in speech described as \"slurred and deliberate\" and abdominal pain. Consideration includes stroke (although patient denies other associated symptoms) and due to Parkinson's disease    RECOMMENDATIONS  1. I had a long discussion with patient and family. Discussed diagnosis, prognosis, pathophysiology and available treatment. Reviewed test results. All questions were answered. 2. Unfortunately, patient has an ICD so unable to do MRI brain to exclude stroke  3. Speech therapy  4. Already on ASA 81 and Eliquis  5. Continue Sinemet abd Aricept  6. LDL 54.6 alreaady on Crestor 20 mg every day   7.  Follow up with his neurologist Dr Nona Jackson at [facility]    Please call for questions        Thank you for the consultation      Sanchez Jarrell MD  Diplomate, American Board of Psychiatry and Neurology  Diplomate, Neuromuscular Medicine  Diplomate, American Board of Electrodiagnostic Medicine    Greater than 50% of time spent counseling patient        CC: Miguel Ángel Mendez MD  Fax: [phone]

## 2019-10-29 NOTE — ED NOTES
Resting on stretcher, family at bedside, pt denies any complaints at this time  Offered warm blanket  Family concerned about pt's night medications, medications reviewed by Dariusz Washburn, awaiting orders

## 2019-10-29 NOTE — H&P
History & Physical      Date of admission: 10/28/2019    Patient name: Lawson Fish  MRN: [id number]  YOB: 1940  Age: 78 y.o. Primary care provider:  Jamila Wakefield MD     Source of Information: patient, ED and electronic medical records                              Chief complaint:  Slurred speech/ abdominal pain    History of present illness  Lawson Fish is a 78 y.o. white male with past medical history of Parkinson's disease, MI, vitamin D deficiency, dementia presented to the ED from home with chief complaint of slurred speech and abdominal pain. Primarily however, patient's daughter and son-in-law brought patient to the ED tonight after they notices that the patient's speech was slurred. When asked, the patient notes that his speech has been slurred for ~ the past 2 weeks. Symptoms remained constant, severe, without specific aggravating or alleviating factors. He reports no history of strokes of similar symptom in the past.  He reportedly was recently started on Aricept for dementia. He denies any recent head/ neck trauma or falls. Patient admits to having abdominal located in bilateral lower quadrants. He reportedly has a chronic large left inguinal hernia with chronic LLQ pain but his RLQ pain was notedly new. Pain now is rated as 0/10. There were no reports of new onset syncope, loss of consciousness, headache, neck pain, visual disturbance, numbness, paresthesias, focal weakness, chest pain, palpitations, nausea, vomiting, diarrhea, melena, dysuria, hematuria, calf pain, increased leg swelling/ edema, fever, chills, or rash.     Past Medical History:   Diagnosis Date    Ill-defined condition     MI    [facility]'s disease ([facility] [address]     Vitamin D deficiency 5/5/2017      Past Surgical History:   Procedure Laterality Date    CARDIAC SURG PROCEDURE UNLIST      HX ORTHOPAEDIC      L hip replacement, R shoulder replacement.  HX PACEMAKER      INS PPM/ICD LED DUAL ONLY  7/13/2015         INS PPM/ICD LED DUAL ONLY  3/1/2016          Prior to Admission medications    Medication Sig Start Date End Date Taking? Authorizing Provider   donepezil (ARICEPT) 5 mg tablet Take 5 mg by mouth nightly. Yes Other, MD Carleen   rosuvastatin (CRESTOR) 20 mg tablet Take 20 mg by mouth nightly. Yes Other, MD Carleen   apixaban (ELIQUIS) 5 mg tablet Take 1 Tab by mouth two (2) times a day. 10/23/19  Yes Genoveva Cuellar MD   aspirin delayed-release 81 mg tablet Take 1 Tab by mouth daily. 5/5/17  Yes Shannon To NP   multivitamin (ONE A DAY) tablet Take 1 Tab by mouth daily. Yes Provider, Historical   coenzyme q10-vitamin e (COQ10 ) 100-100 mg-unit cap Take 1 Tab by mouth daily. Yes Provider, Historical   carbidopa-levodopa (SINEMET)  mg per tablet Take 2 Tabs by mouth three (3) times daily. Yes Provider, Historical   nitroglycerin (NITROSTAT) 0.4 mg SL tablet 1 Tab by SubLINGual route every five (5) minutes as needed for Chest Pain. 3/11/19   Adrian Aguayo MD   carvedilol (COREG) 12.5 mg tablet Take 1 Tab by mouth two (2) times daily (with meals). 2/22/18   Adrian Aguayo MD   cholecalciferol, VITAMIN D3, (VITAMIN D3) 5,000 unit tab tablet Take  by mouth daily. Provider, Historical     ALLERGIES:  No Known Drug Allergies      Social history  Patient resides  x  Independently    x             Ambulates    Independently                 Alcohol history   x  patient notes he never drank alcohol           Smoking history    patient notes he never smoked             Illegal drugs:  Denies    Former     Review of systems  Pertinent positives as noted in HPI.   All other systems were reviewed and were negative     Physical Examination   Visit Vitals  /69 (BP Patient Position: Supine)   Pulse 67   Temp 97.5 °F (36.4 °C)   Resp 15   Ht 5' 4\" (1.626 m)   Wt 62.1 kg (137 lb)   SpO2 96%   BMI 23.52 kg/m²          O2 Device: Room air    General:  Elderly male in no acute respiratory distress. Head:  Normocephalic, without obvious abnormality, atraumatic   Eyes:  Conjunctivae/corneas clear. PERRL, EOMs intact   E/N/M/T: Nares normal. Septum midline. No nasal drainage or sinus tenderness  Tongue midline/ non-edematous  Clear oropharynx   Neck: Normal appearance and movements, symmetrical, trachea midline  No palpable adenopathy  No thyroid enlargement, tenderness or nodules  No carotid bruit   No JVD  Trachea midline   Lungs:   Symmetrical chest expansion and respiratory effort  Clear to auscultation bilaterally   Chest wall:  No tenderness or deformity   Heart:  Regular rate and rhythm   Normal S1 and S2; no murmur, click, rub or gallop   Abdomen:   Soft, mild tenderness in BLQ  No rebound, guarding, or rigidity  Non-distended   Bowel sounds normal  No masses or hepatosplenomegaly  Left inguinal hernia present   Back: No costovertebral angle tenderness  No step-off deformity   Extremities: Extremities normal, atraumatic  No cyanosis or edema     Vascular/  Pulses: 2+ radial/ DP bilateral pulses   Integument/  Skin: No rashes or ulcers  Warm and dry   Musculo-      skeletal: Gait not tested  Normal symmetry, ROM, strength and tone  No calf tenderness   Neuro: GCS 15. Moves all extremities x 4. No slurred speech. No facial droop. Sensation grossly intact. No pronator drift.    Psych: Alert, oriented x 3           I reviewed the following data:        24 Hour Results:  Recent Results (from the past 24 hour(s))   GLUCOSE, POC    Collection Time: 10/28/19  6:26 PM   Result Value Ref Range    Glucose (POC) 82 65 - 100 mg/dL    Performed by Shaun Costa Rican    CBC WITH AUTOMATED DIFF    Collection Time: 10/28/19  6:40 PM   Result Value Ref Range    WBC 3.2 (L) 4.1 - 11.1 K/uL    RBC 4.48 4.10 - 5.70 M/uL    HGB 14.1 12.1 - 17.0 g/dL    HCT 43.5 36.6 - 50.3 %    MCV 97.1 80.0 - 99.0 FL    MCH 31.5 26.0 - 34.0 PG    MCHC 32.4 30.0 - 36.5 g/dL    RDW 15.2 (H) 11.5 - 14.5 %    PLATELET 66 (L) 622 - 400 K/uL    MPV 11.7 8.9 - 12.9 FL    NRBC 0.0 0  WBC    ABSOLUTE NRBC 0.00 0.00 - 0.01 K/uL    NEUTROPHILS 52 32 - 75 %    LYMPHOCYTES 23 12 - 49 %    MONOCYTES 12 5 - 13 %    EOSINOPHILS 12 (H) 0 - 7 %    BASOPHILS 1 0 - 1 %    IMMATURE GRANULOCYTES 0 0.0 - 0.5 %    ABS. NEUTROPHILS 1.7 (L) 1.8 - 8.0 K/UL    ABS. LYMPHOCYTES 0.7 (L) 0.8 - 3.5 K/UL    ABS. MONOCYTES 0.4 0.0 - 1.0 K/UL    ABS. EOSINOPHILS 0.4 0.0 - 0.4 K/UL    ABS. BASOPHILS 0.0 0.0 - 0.1 K/UL    ABS. IMM. GRANS. 0.0 0.00 - 0.04 K/UL    DF SMEAR SCANNED      RBC COMMENTS NORMOCYTIC, NORMOCHROMIC     METABOLIC PANEL, COMPREHENSIVE    Collection Time: 10/28/19  6:40 PM   Result Value Ref Range    Sodium 143 136 - 145 mmol/L    Potassium 4.6 3.5 - 5.1 mmol/L    Chloride 110 (H) 97 - 108 mmol/L    CO2 30 21 - 32 mmol/L    Anion gap 3 (L) 5 - 15 mmol/L    Glucose 90 65 - 100 mg/dL    BUN 18 6 - 20 MG/DL    Creatinine 1.03 0.70 - 1.30 MG/DL    BUN/Creatinine ratio 17 12 - 20      GFR est AA >60 >60 ml/min/1.73m2    GFR est non-AA >60 >60 ml/min/1.73m2    Calcium 9.2 8.5 - 10.1 MG/DL    Bilirubin, total 0.8 0.2 - 1.0 MG/DL    ALT (SGPT) 13 12 - 78 U/L    AST (SGOT) 40 (H) 15 - 37 U/L    Alk.  phosphatase 135 (H) 45 - 117 U/L    Protein, total 7.2 6.4 - 8.2 g/dL    Albumin 4.0 3.5 - 5.0 g/dL    Globulin 3.2 2.0 - 4.0 g/dL    A-G Ratio 1.3 1.1 - 2.2     PTT    Collection Time: 10/28/19  6:40 PM   Result Value Ref Range    aPTT 29.5 22.1 - 32.0 sec    aPTT, therapeutic range     58.0 - 77.0 SECS   PROTHROMBIN TIME + INR    Collection Time: 10/28/19  6:40 PM   Result Value Ref Range    INR 1.4 (H) 0.9 - 1.1      Prothrombin time 13.9 (H) 9.0 - 11.1 sec   TROPONIN I    Collection Time: 10/28/19  6:40 PM   Result Value Ref Range    Troponin-I, Qt. <0.05 <0.05 ng/mL   EKG, 12 LEAD, INITIAL    Collection Time: 10/28/19  6:43 PM   Result Value Ref Range    Ventricular Rate 64 BPM    Atrial Rate 64 BPM    P-R Interval 280 ms    QRS Duration 156 ms    Q-T Interval 496 ms    QTC Calculation (Bezet) 511 ms    Calculated P Axis 10 degrees    Calculated R Axis -61 degrees    Calculated T Axis 35 degrees    Diagnosis       AV dual-paced rhythm with prolonged AV conduction with frequent premature   ventricular complexes  Abnormal ECG  When compared with ECG of 25-NOV-2015 15:41,  premature ventricular complexes are now present  Vent. rate has decreased BY   9 BPM     URINALYSIS W/ RFLX MICROSCOPIC    Collection Time: 10/28/19  9:31 PM   Result Value Ref Range    Color YELLOW/STRAW      Appearance CLEAR CLEAR      Specific gravity 1.018 1.003 - 1.030      pH (UA) 5.5 5.0 - 8.0      Protein TRACE (A) NEG mg/dL    Glucose NEGATIVE  NEG mg/dL    Ketone NEGATIVE  NEG mg/dL    Bilirubin NEGATIVE  NEG      Blood NEGATIVE  NEG      Urobilinogen 0.2 0.2 - 1.0 EU/dL    Nitrites NEGATIVE  NEG      Leukocyte Esterase NEGATIVE  NEG      WBC 0-4 0 - 4 /hpf    RBC 0-5 0 - 5 /hpf    Epithelial cells FEW FEW /lpf    Bacteria NEGATIVE  NEG /hpf    Hyaline cast 0-2 0 - 5 /lpf     Recent Labs     10/28/19  1840   WBC 3.2*   HGB 14.1   HCT 43.5   PLT 66*     Recent Labs     10/28/19  1840      K 4.6   *   CO2 30   GLU 90   BUN 18   CREA 1.03   CA 9.2   ALB 4.0   TBILI 0.8   SGOT 40*   ALT 13   INR 1.4*       Imaging  CT CODE NEURO HEAD WO CONTRAST     INDICATION: slurred speech     COMPARISON: None.     CONTRAST: None.     TECHNIQUE: Unenhanced CT of the head was performed using 5 mm images. Brain and  bone windows were generated. CT dose reduction was achieved through use of a  standardized protocol tailored for this examination and automatic exposure  control for dose modulation.       FINDINGS:  There is no extra-axial fluid collection hemorrhage shift or masses. There is  mild atrophy.     IMPRESSION  impression: No acute findings.     CT ABD PELV WO CONT     INDICATION: rlq abd pain     COMPARISON: 2015     CONTRAST:  None.     TECHNIQUE:   Thin axial images were obtained through the abdomen and pelvis. Coronal and  sagittal reconstructions were generated. Oral contrast was not administered. CT  dose reduction was achieved through use of a standardized protocol tailored for  this examination and automatic exposure control for dose modulation.      The absence of intravenous contrast material reduces the sensitivity for  evaluation of the solid parenchymal organs of the abdomen.      FINDINGS:   LUNG BASES: Clear. INCIDENTALLY IMAGED HEART AND MEDIASTINUM: Unremarkable. LIVER: No mass or biliary dilatation. GALLBLADDER: Unremarkable. SPLEEN: No mass. PANCREAS: No mass or ductal dilatation. ADRENALS: Unremarkable. KIDNEYS/URETERS: No mass, calculus, or hydronephrosis. STOMACH: Unremarkable. SMALL BOWEL: No dilatation or wall thickening. COLON: No dilatation or wall thickening. Sigmoid diverticulosis of. APPENDIX: Unremarkable. PERITONEUM: No ascites or pneumoperitoneum. 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 Esparto Hind REPRODUCTIVE ORGANS:  URINARY BLADDER: No mass or calculus. BONES: No destructive bone lesion. Bilateral hip prostheses. ADDITIONAL COMMENTS bowel containing left inguinal hernia. IMPRESSION:  No acute findings       Assessment and Plan     1. TIA (transient ischemic attack)- possible. -  Admit to telemetry; place on observation status       -  Order neurovascular checks and fall precautions       -  Consult neurologist        -  Order 2d echocardiogram       -  Continue Aspirin    2. Slurred speech        -  Patient reportedly passed dysphagia screen. -  Resume home medications    3. Generalized abdominal pain       -  CT abdomen/ pelvis was unremarkable. Patient has chronic left inguinal hernia. May follow up as outpatient with general surgeon.         - provide pain management prn while inpatient    4. Long term anticoagulation. On Eliquis. Has h/o atrial flutter. -  Continue Eliquis    5. Parkinson's disease       - continue Sinemet    6. Hyperlipidemia       -  Continue statin therapy       -  Check lipid panel    7. VTE prophylaxis       -  Already on Eliquis      8. History of dementia. Per family report. Albeit at current, patient is A&OX3.       -  Continue Aricept    Code status:  Full code    Patient was seen and evaluated prior to midnight on 10/28/2019.        Signed by: Leticia Griffin MD    October 29, 2019 at 1:20 AM

## 2019-10-29 NOTE — PROGRESS NOTES
10/29/2019  10:00 AM  Reason for Admission:   TIA/Slurrred Speech   pt is a 77 yo  male who presents to [facility] ED by Dtr/NICOLE c/o slurred speech, pt states he has noticed it for the last  month, but family just noticed it tonight. Pt denies any falls at home  PMH:    Parkinson's, MI, dementia            RRAT Score:  23   High Risk/Red                Resources/supports as identified by patient/family:   Pt has supportive Dtr Paris Nathan who lives close by and assists w/  Pt's wife who has dementia, able to assist pt                Top Challenges facing patient (as identified by patient/family and CM): Finances/Medication cost?      The [employer], fills at [employer]? Pt drives, family can assist              Support system or lack thereof? Pt has supportive Dtr Paris Nathan [facility]) [phone]                     Living arrangements? Pt lives w/ wife Orin Barnett in 1 story home w/ 4 entry steps through rear, he reports wife has Alzheimer's and he is her caregiver           Self-care/ADLs/Cognition? Pt reports to be iADLs, AAOx4          Current Advanced Directive/Advance Care Plan:  Pt does not have ACP , Dtr Paris Nathan (H) [phone] is suirrogate                     Plan for utilizing home health:    No history, PT/OT evals pending                 Transition of Care Plan:     1. Hospital admission OBS for medical management, PT/OT/Speech evals, neurology consult    2. CM to follow  3. ACP planning  4. D/C when stable  Plan A: home w/ family assistance, HH, PCP and neurology f/u  Plan B: home w/ family assistance, PCP and neurology f/u  5.  Dtr Malissa Jaramillo to transport       Care Management Interventions  PCP Verified by CM: Sheryl Bosworth, MD, no NN)  Palliative Care Criteria Met (RRAT>21 & CHF Dx)?: No(No documented CHF)  Transition of Care Consult (CM Consult): Discharge Planning  Physical Therapy Consult: Yes  Occupational Therapy Consult: Yes  Speech Therapy Consult: Yes  Current Support Network: Lives with Spouse, Own Home, Family Lives Nearby(pt lives w/ wife in pvt residence, Dtr lives close by, reports to be ambulatory, iADLs, drives)  Confirm Follow Up Transport: Family  Discharge Location  Discharge Placement: Home with family assistance      CM met w/ pt for d/c planning, charted demographics verified  PCP: Clyde West MD, no NNM  DME; None,     CM provided State and [place] OBS letters, explained, pt unable to sign does not have glasses here, copies to pt and to be scanned into EMR.   CM placed TC to pt's Dtr juanjose Donovan

## 2019-10-29 NOTE — PROGRESS NOTES
PHYSICAL THERAPY EVALUATION/DISCHARGE  Patient: Mona Cook (40 y.o. male)  Date: 10/29/2019  Primary Diagnosis: Slurred speech [R47.81]  TIA (transient ischemic attack) [G45.9]       Precautions: universal         ASSESSMENT  Based on the objective data described below, the patient presents with independent with ambulation without assistive device and independent with all functional mobility. Patient at his base line level of function with history of parkinson's and hand tremors. Reviewed sign and symptoms of stroke with the patient and verbalized understanding. Reviewed all safety precaution and home exercise program with the patient, verbalized understanding, clear to go home per Physical Therapy perspective. Skilled physical therapy is not indicated at this time. Functional Outcome Measure: The patient scored 52/56 on the león balance test outcome measure which is indicative of low fall risk. Other factors to consider for discharge: none     Further skilled acute physical therapy is not indicated at this time. PLAN :  Recommendation for discharge: (in order for the patient to meet his/her long term goals)  No skilled physical therapy/ follow up rehabilitation needs identified at this time. This discharge recommendation:  Has been made in collaboration with the attending provider and/or case management    IF patient discharges home will need the following DME: none       SUBJECTIVE:   Patient stated Dawna Spruce.     OBJECTIVE DATA SUMMARY:   HISTORY:    Past Medical History:   Diagnosis Date    Ill-defined condition     MI    [facility]'s disease ([facility] [address]     Vitamin D deficiency 5/5/2017     Past Surgical History:   Procedure Laterality Date    CARDIAC SURG PROCEDURE UNLIST      HX ORTHOPAEDIC      L hip replacement, R shoulder replacement.     HX PACEMAKER      INS PPM/ICD LED DUAL ONLY  7/13/2015         INS PPM/ICD LED DUAL ONLY  3/1/2016            Prior level of function: Independent community ambulator without assistive device. Personal factors and/or comorbidities impacting plan of care:     Home Situation  Home Environment: Private residence  # Steps to Enter: 4  Rails to Enter: Yes  One/Two Story Residence: One story  Living Alone: No  Support Systems: Child(laura), Family member(s)  Patient Expects to be Discharged to[de-identified] Private residence  Current DME Used/Available at Home: Leda Carter, rolling, Grab bars, Cane, straight  Tub or Shower Type: Shower    EXAMINATION/PRESENTATION/DECISION MAKING:   Critical Behavior:  Neurologic State: Alert  Orientation Level: Oriented X4  Cognition: Appropriate decision making, Appropriate for age attention/concentration, Appropriate safety awareness, Follows commands  Safety/Judgement: Awareness of environment, Insight into deficits, Decreased awareness of need for safety  Hearing: Auditory  Auditory Impairment: None    Range Of Motion:  AROM: Generally decreased, functional                       Strength:    Strength: Within functional limits                    Tone & Sensation:   Tone: Normal              Sensation: Intact               Coordination:  Coordination: Grossly decreased, non-functional  Vision:   Corrective Lenses: Reading glasses  Functional Mobility:  Bed Mobility:  Rolling: Modified independent  Supine to Sit: Modified independent  Sit to Supine: Modified independent  Scooting: Modified independent  Transfers:  Sit to Stand: Modified independent  Stand to Sit: Modified independent  Stand Pivot Transfers: Modified independent     Bed to Chair: Modified independent        Sliding Board : Modified independent     Balance:   Sitting: Intact  Standing: Intact; Without support  Ambulation/Gait Training:  Distance (ft): 150 Feet (ft)  Assistive Device: Gait belt  Ambulation - Level of Assistance: Modified independent                                            Therapeutic Exercises:    Instructed patient to continue active range of motion exercise on both legs while up on chair or on bed. León Balance Test:    Sitting to Standin  Standing Unsupported: 4  Sitting with Back Unsupported: 4  Standing to Sittin  Transfers: 4  Standing Unsupported with Eyes Closed: 3  Standing Unsupported with Feet Together: 3  Reach Forward with Outstretched Arm: 4   Object: 4  Turn to Look Over Shoulders: 4  Turn 360 Degrees: 4  Alternate Foot on Step/Stool: 4  Standing Unsupported One Foot in Front: 3  Stand on One Leg: 3  Total: 52/56         56=Maximum possible score;   0-20=High fall risk  21-40=Moderate fall risk   41-56=Low fall risk                Physical Therapy Evaluation Charge Determination   History Examination Presentation Decision-Making   LOW Complexity : Zero comorbidities / personal factors that will impact the outcome / POC LOW Complexity : 1-2 Standardized tests and measures addressing body structure, function, activity limitation and / or participation in recreation  LOW Complexity : Stable, uncomplicated  Other outcome measures león balance test  LOW       Based on the above components, the patient evaluation is determined to be of the following complexity level: LOW     Pain Ratin/10    Activity Tolerance:   Good  Please refer to the flowsheet for vital signs taken during this treatment. After treatment patient left in no apparent distress:   Sitting in chair, Call bell within reach, Bed / chair alarm activated and Caregiver / family present    COMMUNICATION/EDUCATION:   The patients plan of care was discussed with: Occupational Therapist and Registered Nurse. Fall prevention education was provided and the patient/caregiver indicated understanding. Thank you for this referral.  Nancie Escudero, PT,WCC.    Time Calculation: 27 mins

## 2019-10-29 NOTE — PROGRESS NOTES
Speech Pathology bedside swallow evaluation/discharge  Patient: Grisel Demarco (59 y.o. male)  Date: 10/29/2019  Primary Diagnosis: Slurred speech [R47.81]  TIA (transient ischemic attack) [G45.9]       Precautions:        ASSESSMENT :  Based on the objective data described below, the patient presents with a functional oral pharyngeal swallow. No overt s/s of aspiration noted during PO trials; however, patient did exhibit occasional throat clearing after conclusion of eval and PO. Patient also presents with intact  language skills. Mild dysarthria noted. .     Patient was admitted 10-28 with slurred speech (intermittent instances for past two weeks), generalized weakness, and abdominal pain. Head CT: neg. CXR: neg. Abd CT: neg. PMH: parkinson's disease, MI, coronary dx, vitamin D deficiency, dementia    Skilled acute therapy provided by a speech-language pathologist is not indicated at this time. PLAN :  Recommendations:  OK for cardiac regular diet, thins  Outpatient speech therapy for [facility]   Discharge Recommendations: Outpatient Speech Therapy     SUBJECTIVE:   Patient stated oh, you don't want my cooking. OBJECTIVE:     Past Medical History:   Diagnosis Date    Ill-defined condition     MI    Parkinson's disease ([facility] [address]     Vitamin D deficiency 5/5/2017     Past Surgical History:   Procedure Laterality Date    CARDIAC SURG PROCEDURE UNLIST      HX ORTHOPAEDIC      L hip replacement, R shoulder replacement.     HX PACEMAKER      INS PPM/ICD LED DUAL ONLY  7/13/2015         INS PPM/ICD LED DUAL ONLY  3/1/2016          Prior Level of Function/Home Situation: lives at home with wife who has Alzheimer's disease; daughter lives down the street and helps daily  Home Situation  Home Environment: Private residence  # Steps to Enter: 4  Rails to Enter: Yes  One/Two Story Residence: One story  Living Alone: No  Support Systems: Child(laura), Family member(s)  Patient Expects to be Discharged to[de-identified] Private residence  Current DME Used/Available at Home: [address], rolling, Grab bars, Cane, straight  Tub or Shower Type: Shower  Diet prior to admission: regular, thins  Current Diet:  NPO   Cognitive and Communication Status:  Neurologic State: Alert  Orientation Level: Oriented X4  Cognition: Appropriate decision making, Appropriate for age attention/concentration, Appropriate safety awareness, Follows commands  Perception: Appears intact  Perseveration: No perseveration noted  Safety/Judgement: Awareness of environment, Insight into deficits, Decreased awareness of need for safety  Oral Assessment:  Oral Assessment  Labial: No impairment  Dentition: Natural;Intact  Oral Hygiene: WFL  Lingual: (WFL protraction/retraction; lingual tremors present at rest)  Velum: No impairment  Mandible: No impairment  Gag Reflex: (deferred testing)  P.O. Trials:  Patient Position: patient sitting upright in bed; able to reposition himself in the bed  Vocal quality prior to P.O.: Low volume(low volume as conversation persisted)  Consistency Presented: Solid;Puree; Thin liquid  How Presented: Spoon;Straw;Self-fed/presented  How Much: (3 sips of water; 1 cup of applesauce; 2 bites of cracker)     ORAL PHASE:  Bolus Acceptance: No impairment  Bolus Formation/Control: No impairment     Propulsion: No impairment  Oral Residue: None     PHARYNGEAL PHASE:  Initiation of Swallow: No impairment  Laryngeal Elevation: Functional  Aspiration Signs/Symptoms: Clear throat(clear throat x3 following conclusion of exam and after PO trials)  Pharyngeal Phase Characteristics: No impairment, issues, or problems   Effective Modifications: None  Cues for Modifications: None     Speech:  DDK-mild spirantizations noted with bilabials; velars and alveolars WNL; patient seemed to use first trials as a warm-up and speech improved from there  Patient began by speaking with precise, over-articulation and then warmed up as session progressed. Volume continued to drop as session progressed as well. 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 Recommend LVST therapy        NOMS:   The NOMS functional outcome measure was used to quantify this patient's level of swallowing impairment. Based on the NOMS, the patient was determined to be at level 7 for swallow function     NOMS Swallowing Levels:  Level 1 (CN): NPO  Level 2 (CM): NPO but takes consistency in therapy  Level 3 (CL): Takes less than 50% of nutrition p.o. and continues with nonoral feedings; and/or safe with mod cues; and/or max diet restriction  Level 4 (CK): Safe swallow but needs mod cues; and/or mod diet restriction; and/or still requires some nonoral feeding/supplements  Level 5 (CJ): Safe swallow with min diet restriction; and/or needs min cues  Level 6 (CI): Independent with p.o.; rare cues; usually self cues; may need to avoid some foods or needs extra time  Level 7 ([address] [place] [address]): Independent for all p.o.  [employer]. (2003). National Outcomes Measurement System (NOMS): Adult Speech-Language Pathology User's Guide. After treatment:   [] Patient left in no apparent distress sitting up in chair  [x] Patient left in no apparent distress in bed  [x] Call bell left within reach  [x] Nursing notified  [] Caregiver present  [] Bed alarm activated    COMMUNICATION/EDUCATION:   The patients plan of care including findings, recommendations, and recommended diet changes were discussed with: Registered Nurse. Patient was educated about SLP visit and verbalized understanding. [] Posted safety precautions in patient's room. [x] Patient/family have participated as able and agree with findings and recommendations. [] Patient is unable to participate in plan of care at this time.     Thank you for this referral.  Reji Cosby           Regarding student involvement in patient care:  A student participated in this treatment session. Per CMS Medicare statements and [employer] guidelines I certify that the following was true:  1. I was present and directly observed the entire session. 2. I made all skilled judgments and clinical decisions regarding care. 3. I am the practitioner responsible for assessment, treatment, and documentation.

## 2019-10-29 NOTE — PROGRESS NOTES
Met with patient at bedside. Several family members present. [place] and State observation letters signed and placed on chart. Awaiting PT, OT, Speech therapy, and neuro evaluations. Plan:  Monitor patient for post discharge needs. Await consult recommendations.     Boyd Gaines RN, MSN/Care manager

## 2019-10-29 NOTE — PROGRESS NOTES
TRANSFER - IN REPORT:    Verbal report received from Jessica(name) on [employer]  being received from ED(unit) for routine progression of care      Report consisted of patients Situation, Background, Assessment and   Recommendations(SBAR). Information from the following report(s) SBAR, Kardex, Intake/Output and Recent Results was reviewed with the receiving nurse. Opportunity for questions and clarification was provided. Assessment completed upon patients arrival to unit and care assumed. 1800- pt family has concerns about pt's slight left eye droop. Granddaughter at bedside, stated that she left pt at 2 a.m last night at which point pt did not show droop. Called MD Bozena Cobb and was advised to put in an order for echo and to have Neuro see pt. Page sent to Neuro. [address] Kyleigh Puri called back and was informed about pt family concerns. MD stated to observe pt for any increasing facial droop, arm weakness etc.  Pt family stated that they spoke with MD KAR[facility] and he recommended to d/c Aricept for 1 week d/t stomach upset. MD Kyleigh Prui  stated to go ahead and discontinue Aricept order per recommendation of pt outpatient Neurologist [facility] Bedside shift change report given to Nate (oncoming nurse) by Shelia Pacheco (offgoing nurse). Report included the following information SBAR, Kardex, Intake/Output and Recent Results.

## 2019-10-29 NOTE — PROGRESS NOTES
OCCUPATIONAL THERAPY EVALUATION/DISCHARGE  Patient: Narciso Valera (79 y.o. male)  Date: 10/29/2019  Primary Diagnosis: Slurred speech [R47.81]  TIA (transient ischemic attack) [G45.9]       Precautions:        ASSESSMENT  Based on the objective data described below, the patient presents with hospital admission secondary to R side abdominal discomfort and slurred speech. Patient reports slurred speech present for a few days prior to family noticing. He reports noting some difficulty in forming words at this time but not noted by OT. Patient speaking clearly and fluently during OT evaluation. Patient with baseline PD and hand tremors. He reports moderately active and able to perform all ADL tasks with exception of donning socks/shoes secondary to inguinal hernia at L side and back pain limiting ability to reach to floor. Patient educated regarding BE FAST protocol and verbalized understanding . Functional Outcome Measure: The patient scored 62/66 on the Barthel Index  outcome measure. Other factors to consider for discharge: none     PLAN :      Recommendation for discharge: (in order for the patient to meet his/her long term goals)  No skilled occupational therapy/ follow up rehabilitation needs identified at this time. This discharge recommendation:  Has been made in collaboration with the attending provider and/or case management    IF patient discharges home will need the following DME: none       SUBJECTIVE:   Patient stated I would love to sit up and get out of bed.     OBJECTIVE DATA SUMMARY:   HISTORY:   Past Medical History:   Diagnosis Date    Ill-defined condition     MI    Parkinson's disease ([facility] [address]     Vitamin D deficiency 5/5/2017     Past Surgical History:   Procedure Laterality Date    CARDIAC SURG PROCEDURE UNLIST      HX ORTHOPAEDIC      L hip replacement, R shoulder replacement.     HX PACEMAKER      INS PPM/ICD LED DUAL ONLY  7/13/2015         INS PPM/ICD LED DUAL ONLY 3/1/2016            Prior Level of Function/Environment/Context: MOD I   Expanded or extensive additional review of patient history:   Home Situation  Home Environment: Private residence  # Steps to Enter: 4  Rails to Enter: Yes  One/Two Story Residence: One story  Living Alone: No  Support Systems: Child(laura), Family member(s)  Patient Expects to be Discharged to[de-identified] Private residence  Current DME Used/Available at Home: Walker, rolling, Grab bars, Cane, straight  Tub or Shower Type: Shower    Hand dominance: Right    EXAMINATION OF PERFORMANCE DEFICITS:  Cognitive/Behavioral Status:  Neurologic State: Alert  Orientation Level: Oriented X4  Cognition: Appropriate decision making; Appropriate for age attention/concentration; Appropriate safety awareness; Follows commands  Perception: Appears intact  Perseveration: No perseveration noted  Safety/Judgement: Awareness of environment; Insight into deficits; Decreased awareness of need for safety    Skin: intact as seen    Edema: none     Hearing: Auditory  Auditory Impairment: None    Vision/Perceptual:                                Corrective Lenses: Reading glasses    Range of Motion:  AROM: Generally decreased, functional                         Strength:  Strength: Within functional limits                Coordination:  Coordination: Grossly decreased, non-functional  Fine Motor Skills-Upper: Left Intact; Right Intact    Gross Motor Skills-Upper: Left Intact; Right Intact    Tone & Sensation  Tone: Normal  Sensation: Intact                      Balance:  Sitting: Intact  Standing: Intact; Without support    Functional Mobility and Transfers for ADLs:  Bed Mobility:  Rolling: Modified independent  Supine to Sit: Modified independent  Sit to Supine: Modified independent  Scooting: Modified independent    Transfers:  Sit to Stand: Modified independent  Stand to Sit: Modified independent  Bed to Chair: Modified independent  Bathroom Mobility: Modified independent  Toilet Transfer : Modified independent    ADL Assessment:  Feeding: Modified independent    Oral Facial Hygiene/Grooming: Modified Independent    Bathing: Modified independent    Upper Body Dressing: Modified independent    Lower Body Dressing: Modified independent    Toileting: Modified independent                ADL Intervention and task modifications:                                     Cognitive Retraining  Safety/Judgement: Awareness of environment; Insight into deficits; Decreased awareness of need for safety    Therapeutic Exercise:    Functional Measure:  Fugl-Almanza Assessment of Motor Recovery after Stroke:     Reflex Activity  Flexors/Biceps/Fingers: Can be elicited  Extensors/Triceps: Can be elicited  Reflex Subtotal: 4    Volitional Movement Within Synergies  Shoulder Retraction: Full  Shoulder Elevation: Full  Shoulder Abduction (90 degrees): Full  Shoulder External Rotation: Full  Elbow Flexion: Full  Forearm Supination: Full  Shoulder Adduction/Internal Rotation: Full  Elbow Extension: Full  Forearm Pronation: Full  Subtotal: 18    Volitional Movement Mixing Synergies  Hand to Lumbar Spine: Full  Shoulder Flexion (0-90 degrees): Full  Pronation-Supination: Full  Subtotal: 6    Volitional Movement With Little or No Synergy  Shoulder Abduction (0-90 degrees): Full  Shoulder Flexion ( degrees): None  Pronation/Supination: Full  Subtotal : 4    Normal Reflex Activity  Biceps, Triceps, Finger Flexors:  Full  Subtotal : 2    Upper Extremity Total   Upper Extremity Total: 34    Wrist  Stability at 15 Degree Dorsiflexion: Full  Repeated Dorsiflexion/ Volar Flexion: Full  Stability at 15 Degree Dorsiflexion: Full  Repeated Dorsiflexion/ Volar Flexion: Full  Circumduction: Full  Wrist Total: 10    Hand  Mass Flexion: Full  Mass Extension: Full  Grasp A: Full  Grasp B: Full  Grasp C: Full  Grasp D: Full  Grasp E: Full  Hand Total: 14    Coordination/Speed  Tremor: Slight  Dysmetria: Slight  Time: <1s  Coordination/Speed Total : 4    Total A-D  Total A-D (Motor Function): 62/66         This is a reliable/valid measure of arm function after a neurological event. It has established value to characterize functional status and for measuring spontaneous and therapy-induced recovery; tests proximal and distal motor functions. Fugl-Almanza Assessment - UE scores recorded between five and 30 days post neurologic event can be used to predict UE recovery at six months post neurologic event. Severe = 0-21 points   Moderately Severe = 22-33 points   Moderate = 34-47 points   Mild = 48-66 points  LASHELL Perry, RACH Hernandez, & NURY Lester (1992). Measurement of motor recovery after stroke: Outcome assessment and sample size requirements.  Stroke, 23, pp. 0535-5257.   --------------------------------------------------------------------------------------------------------------------------------------------------------------------  MCID:  Stroke:   Andrey Home et al, 2001; n = 171; mean age 79 (6) years; assessed within 16 (12) days of stroke, Acute Stroke)  FMA Motor Scores from Admission to Discharge   10 point increase in FMA Upper Extremity = 1.5 change in discharge FIM   10 point increase in FMA Lower Extremity = 1.9 change in discharge FIM  MDC:   Stroke:   Allen Dorado et al, 2008, n = 14, mean age = 59.9 (14.6) years, assessed on average 14 (6.5) months post stroke, Chronic Stroke)   FMA = 5.2 points for the Upper Extremity portion of the assessment         Occupational Therapy Evaluation Charge Determination   History Examination Decision-Making   LOW Complexity : Brief history review  LOW Complexity : 1-3 performance deficits relating to physical, cognitive , or psychosocial skils that result in activity limitations and / or participation restrictions  LOW Complexity : No comorbidities that affect functional and no verbal or physical assistance needed to complete eval tasks       Based on the above components, the patient evaluation is determined to be of the following complexity level: LOW   Pain Rating:      Activity Tolerance:   Good  Please refer to the flowsheet for vital signs taken during this treatment. After treatment patient left in no apparent distress:    Sitting in chair, Call bell within reach and Caregiver / family present    COMMUNICATION/EDUCATION:   The patients plan of care was discussed with: Physical Therapist and Registered Nurse.     Thank you for this referral.  Brian Whitmore OTR/L  Time Calculation: 23 mins

## 2019-10-29 NOTE — PROGRESS NOTES
0130  Bedside and Verbal shift change report given to Awilda (oncoming nurse) by Virginia Bone (offgoing nurse). Report included the following information SBAR, Kardex, MAR and Recent Results. 0720  Bedside and Verbal shift change report given to Nabor Tapia (oncoming nurse) by Candy Jack (offgoing nurse). Report included the following information SBAR, Kardex, MAR and Recent Results.

## 2019-10-29 NOTE — PROGRESS NOTES
[facility]: MED RECONCILIATION    Comments/Recommendations:   Patient reports to pharmacist that nurse completed med rec with pill bottles last evening. Pill bottles have now been taken home. Coreg 12.5 mg BID, vitamin D 5000 units daily, nitroglycerin prn were documented as not taking by nurse but not removed from the PTA list.  Pharmacist reviewed prescription refill history with [facility] and interviewed the patient. The patient had carvedilol 6.25 mg last filled on 9/14/19 for 180 pills for a 90 day supply. Pharmacist called the patient's daughter twice with no answer and left a message requesting call back. Pharmacist called the office of Dr. Shante Mart and left a message requesting call back for clarification. Medications added:     None    Medications removed:  Vitamin D based on nurse med rec with pill bottles  Coreg based on nurse med rec    Medications adjusted:    None    Allergies: Patient has no known allergies. Prior to Admission Medications:     Medication Documentation Review Audit       Reviewed by Shelbi Gleason PHARMD (Pharmacist) on 10/29/19 at 1005      Medication Sig Documenting Provider Last Dose Status Taking? apixaban (ELIQUIS) 5 mg tablet Take 1 Tab by mouth two (2) times a day. Kade Corley MD 10/28/2019 Unknown time Active Yes   aspirin delayed-release 81 mg tablet Take 1 Tab by mouth daily. Flor Youngblood NP 10/28/2019 Unknown time Active Yes   carbidopa-levodopa (SINEMET)  mg per tablet Take 2 Tabs by mouth three (3) times daily. Provider, Historical 10/28/2019 Unknown time Active Yes   carvedilol (COREG) 12.5 mg tablet Take 1 Tab by mouth two (2) times daily (with meals). Kade Corley MD Not Taking Unknown time Active No   coenzyme q10-vitamin e (COQ10 ) 100-100 mg-unit cap Take 1 Tab by mouth daily. Provider, Historical 10/28/2019 Unknown time Active Yes   donepezil (ARICEPT) 5 mg tablet Take 5 mg by mouth nightly.  Carleen Cohen MD 10/28/2019 Unknown time Active Yes   multivitamin (ONE A DAY) tablet Take 1 Tab by mouth daily. Provider, Historical 10/28/2019 Unknown time Active Yes   nitroglycerin (NITROSTAT) 0.4 mg SL tablet 1 Tab by SubLINGual route every five (5) minutes as needed for Chest Pain. Bere Ramos MD  Active Yes   rosuvastatin (CRESTOR) 20 mg tablet Take 20 mg by mouth nightly.  Other, MD Carleen 10/28/2019 Unknown time Active Yes                  Thank you,      Eulalio Simon, PharmD, BCPS

## 2019-10-30 ENCOUNTER — APPOINTMENT (OUTPATIENT)
Dept: NON INVASIVE DIAGNOSTICS | Age: 79
End: 2019-10-30
Attending: FAMILY MEDICINE
Payer: MEDICARE

## 2019-10-30 ENCOUNTER — APPOINTMENT (OUTPATIENT)
Dept: VASCULAR SURGERY | Age: 79
End: 2019-10-30
Attending: FAMILY MEDICINE
Payer: MEDICARE

## 2019-10-30 VITALS
WEIGHT: 137 LBS | SYSTOLIC BLOOD PRESSURE: 149 MMHG | HEART RATE: 69 BPM | HEIGHT: 64 IN | TEMPERATURE: 97.4 F | BODY MASS INDEX: 23.39 KG/M2 | RESPIRATION RATE: 17 BRPM | DIASTOLIC BLOOD PRESSURE: 78 MMHG | OXYGEN SATURATION: 96 %

## 2019-10-30 LAB
ALBUMIN SERPL-MCNC: 3.6 G/DL (ref 3.5–5)
ALBUMIN/GLOB SERPL: 1.2 {RATIO} (ref 1.1–2.2)
ALP SERPL-CCNC: 127 U/L (ref 45–117)
ALT SERPL-CCNC: 12 U/L (ref 12–78)
ANION GAP SERPL CALC-SCNC: 7 MMOL/L (ref 5–15)
AST SERPL-CCNC: 30 U/L (ref 15–37)
AV PEAK GRADIENT: 41 MMHG
AV VELOCITY RATIO: 0.65
BASOPHILS # BLD: 0.1 K/UL (ref 0–0.1)
BASOPHILS NFR BLD: 1 % (ref 0–1)
BILIRUB SERPL-MCNC: 0.9 MG/DL (ref 0.2–1)
BUN SERPL-MCNC: 16 MG/DL (ref 6–20)
BUN/CREAT SERPL: 19 (ref 12–20)
CALCIUM SERPL-MCNC: 8.7 MG/DL (ref 8.5–10.1)
CHLORIDE SERPL-SCNC: 110 MMOL/L (ref 97–108)
CO2 SERPL-SCNC: 24 MMOL/L (ref 21–32)
CREAT SERPL-MCNC: 0.83 MG/DL (ref 0.7–1.3)
DIFFERENTIAL METHOD BLD: ABNORMAL
ECHO AO ROOT DIAM: 3.09 CM
ECHO AV AREA PEAK VELOCITY: 1.9 CM2
ECHO AV PEAK GRADIENT: 6.2 MMHG
ECHO AV PEAK VELOCITY: 124.66 CM/S
ECHO AV REGURGITANT PHT: 663.6 CM
ECHO EST RA PRESSURE: 3 MMHG
ECHO LA MAJOR AXIS: 3.33 CM
ECHO LA TO AORTIC ROOT RATIO: 1.08
ECHO LA VOL 2C: 44.97 ML (ref 18–58)
ECHO LA VOL 4C: 53.64 ML (ref 18–58)
ECHO LA VOL BP: 59.58 ML (ref 18–58)
ECHO LA VOL/BSA BIPLANE: 35.77 ML/M2 (ref 16–28)
ECHO LA VOLUME INDEX A2C: 27 ML/M2 (ref 16–28)
ECHO LA VOLUME INDEX A4C: 32.21 ML/M2 (ref 16–28)
ECHO LV INTERNAL DIMENSION DIASTOLIC: 5.18 CM (ref 4.2–5.9)
ECHO LV INTERNAL DIMENSION SYSTOLIC: 4.29 CM
ECHO LV IVSD: 0.79 CM (ref 0.6–1)
ECHO LV MASS 2D: 132.9 G (ref 88–224)
ECHO LV MASS INDEX 2D: 79.8 G/M2 (ref 49–115)
ECHO LV POSTERIOR WALL DIASTOLIC: 0.57 CM (ref 0.6–1)
ECHO LVOT DIAM: 1.92 CM
ECHO LVOT PEAK GRADIENT: 2.6 MMHG
ECHO LVOT PEAK VELOCITY: 81.35 CM/S
ECHO MV A VELOCITY: 83.98 CM/S
ECHO MV E DECELERATION TIME (DT): 188.4 MS
ECHO MV E VELOCITY: 87.71 CM/S
ECHO MV E/A RATIO: 1.04
ECHO MV REGURGITANT PEAK GRADIENT: 62.9 MMHG
ECHO MV REGURGITANT PEAK VELOCITY: 396.55 CM/S
ECHO PULMONARY ARTERY SYSTOLIC PRESSURE (PASP): 21.8 MMHG
ECHO PV MAX VELOCITY: 53.57 CM/S
ECHO PV PEAK GRADIENT: 1.1 MMHG
ECHO RIGHT VENTRICULAR SYSTOLIC PRESSURE (RVSP): 21.8 MMHG
ECHO RV INTERNAL DIMENSION: 5.03 CM
ECHO TV REGURGITANT MAX VELOCITY: 216.87 CM/S
ECHO TV REGURGITANT PEAK GRADIENT: 18.8 MMHG
EOSINOPHIL # BLD: 0.4 K/UL (ref 0–0.4)
EOSINOPHIL NFR BLD: 8 % (ref 0–7)
ERYTHROCYTE [DISTWIDTH] IN BLOOD BY AUTOMATED COUNT: 14.9 % (ref 11.5–14.5)
GLOBULIN SER CALC-MCNC: 2.9 G/DL (ref 2–4)
GLUCOSE SERPL-MCNC: 81 MG/DL (ref 65–100)
HCT VFR BLD AUTO: 40.5 % (ref 36.6–50.3)
HGB BLD-MCNC: 13.5 G/DL (ref 12.1–17)
IMM GRANULOCYTES # BLD AUTO: 0 K/UL (ref 0–0.04)
IMM GRANULOCYTES NFR BLD AUTO: 0 % (ref 0–0.5)
LVFS 2D: 17.16 %
LYMPHOCYTES # BLD: 0.7 K/UL (ref 0.8–3.5)
LYMPHOCYTES NFR BLD: 17 % (ref 12–49)
MCH RBC QN AUTO: 31.3 PG (ref 26–34)
MCHC RBC AUTO-ENTMCNC: 33.3 G/DL (ref 30–36.5)
MCV RBC AUTO: 94 FL (ref 80–99)
MONOCYTES # BLD: 0.4 K/UL (ref 0–1)
MONOCYTES NFR BLD: 10 % (ref 5–13)
MV DEC SLOPE: 4.65
NEUTS SEG # BLD: 2.7 K/UL (ref 1.8–8)
NEUTS SEG NFR BLD: 63 % (ref 32–75)
NRBC # BLD: 0 K/UL (ref 0–0.01)
NRBC BLD-RTO: 0 PER 100 WBC
PISA AR MAX VEL: 320.17 CM/S
PLATELET # BLD AUTO: 67 K/UL (ref 150–400)
PMV BLD AUTO: 12 FL (ref 8.9–12.9)
POTASSIUM SERPL-SCNC: 4 MMOL/L (ref 3.5–5.1)
PROT SERPL-MCNC: 6.5 G/DL (ref 6.4–8.2)
PULMONARY ARTERY END DIASTOLIC PRESSURE: 5.6 MMHG
PULMONARY ARTERY MEAN PRESURE: 11 MMHG
PV END DIASTOLIC VELOCITY: 0.8 MMHG
RBC # BLD AUTO: 4.31 M/UL (ref 4.1–5.7)
SODIUM SERPL-SCNC: 141 MMOL/L (ref 136–145)
WBC # BLD AUTO: 4.3 K/UL (ref 4.1–11.1)

## 2019-10-30 PROCEDURE — 93306 TTE W/DOPPLER COMPLETE: CPT

## 2019-10-30 PROCEDURE — 36415 COLL VENOUS BLD VENIPUNCTURE: CPT

## 2019-10-30 PROCEDURE — 85025 COMPLETE CBC W/AUTO DIFF WBC: CPT

## 2019-10-30 PROCEDURE — 74011250637 HC RX REV CODE- 250/637: Performed by: FAMILY MEDICINE

## 2019-10-30 PROCEDURE — 99218 HC RM OBSERVATION: CPT

## 2019-10-30 PROCEDURE — 80053 COMPREHEN METABOLIC PANEL: CPT

## 2019-10-30 PROCEDURE — 94760 N-INVAS EAR/PLS OXIMETRY 1: CPT

## 2019-10-30 PROCEDURE — 93880 EXTRACRANIAL BILAT STUDY: CPT

## 2019-10-30 RX ADMIN — CARBIDOPA AND LEVODOPA 2 TABLET: 25; 100 TABLET ORAL at 09:30

## 2019-10-30 RX ADMIN — APIXABAN 5 MG: 5 TABLET, FILM COATED ORAL at 09:30

## 2019-10-30 RX ADMIN — ASPIRIN 81 MG: 81 TABLET, COATED ORAL at 09:30

## 2019-10-30 NOTE — DISCHARGE SUMMARY
Physician Discharge Summary     Patient ID:    Edie Torres  [id number]  78 y.o.  1940  Silviano Wing MD    Admit date: 10/28/2019    Discharge date and time: 10/30/2019    Admission Diagnoses: Slurred speech [R47.81];TIA (transient ischemic attack) [G45.9]    Chronic Diagnoses:    Problem List as of 10/30/2019 Date Reviewed: 3/5/2019          Codes Class Noted - Resolved    TIA (transient ischemic attack) ICD-10-CM: G45.9  ICD-9-CM: 435.9  10/28/2019 - Present        Slurred speech ICD-10-CM: R47.81  ICD-9-CM: 784.59  10/28/2019 - Present        Vitamin D deficiency ICD-10-CM: E55.9  ICD-9-CM: 268.9  5/5/2017 - Present        Atypical atrial flutter ([facility] [address] ICD-10-CM: I48.4  ICD-9-CM: 427.32  5/5/2017 - Present    Overview Signed 5/5/2017 11:18 AM by Leilani PRO     S/p MI 7/15             S/P ICD (internal cardiac defibrillator) procedure ICD-10-CM: Z95.810  ICD-9-CM: V45.02  3/1/2016 - Present    Overview Signed 3/1/2016  3:05 PM by Juan M Rogers MD     Dual chamber [employer] mini ICD implant with DFT < 26 J 3/1/2016             Ischemic cardiomyopathy ICD-10-CM: I25.5  ICD-9-CM: 414.8  11/11/2015 - Present        Medication intolerance ICD-10-CM: Z78.9  ICD-9-CM: 995.27  7/29/2015 - Present        CAD (coronary artery disease) ICD-10-CM: I25.10  ICD-9-CM: 414.00  7/10/2015 - Present    Overview Signed 7/10/2015  9:59 PM by Watson Alpers, MD     cabg times 3 chip 6/96, with subsequent stents lcx.              Parkinson's disease ([facility] [address] ICD-10-CM: G20  ICD-9-CM: 332.0  7/10/2015 - Present        Hyperlipidemia ICD-10-CM: E78.5  ICD-9-CM: 272.4  7/10/2015 - Present        STEMI (ST elevation myocardial infarction) ([facility] [address] ICD-10-CM: I21.3  ICD-9-CM: 410.90  7/10/2015 - Present        Heart block ICD-10-CM: I45.9  ICD-9-CM: 426.9  7/10/2015 - Present        Abdominal aortic aneurysm ([facility]) ICD-10-CM: I71.4  ICD-9-CM: 441.4  7/10/2015 - Present    Overview Signed 7/10/2015 10:16 PM by Zak Gardiner MD     Endograft, details unknown. Discharge Medications:   Current Discharge Medication List      CONTINUE these medications which have NOT CHANGED    Details   donepezil (ARICEPT) 5 mg tablet Take 5 mg by mouth nightly. rosuvastatin (CRESTOR) 20 mg tablet Take 20 mg by mouth nightly. apixaban (ELIQUIS) 5 mg tablet Take 1 Tab by mouth two (2) times a day. Qty: 60 Tab, Refills: 0    Comments: NEEDS APPOINTMENT  Associated Diagnoses: Coronary artery disease involving native coronary artery of native heart without angina pectoris      nitroglycerin (NITROSTAT) 0.4 mg SL tablet 1 Tab by SubLINGual route every five (5) minutes as needed for Chest Pain. Qty: 1 Bottle, Refills: 5      aspirin delayed-release 81 mg tablet Take 1 Tab by mouth daily. Qty: 90 Tab, Refills: 3      multivitamin (ONE A DAY) tablet Take 1 Tab by mouth daily. coenzyme q10-vitamin e (COQ10 ) 100-100 mg-unit cap Take 1 Tab by mouth daily. carbidopa-levodopa (SINEMET)  mg per tablet Take 2 Tabs by mouth three (3) times daily. carvedilol (COREG) 6.25 mg tablet Take 6.25 mg by mouth two (2) times daily (with meals). Follow up Care:    1. Sindy Wade MD with in 1 weeks  2. Dr Latoya Alarcon Neuro specialists as directed. Diet:  Cardiac Diet    Disposition:  Home. Advanced Directive:    Discharge Exam:  [See today's progress note.]    CONSULTATIONS: Neurology    Significant Diagnostic Studies:   Right Carotid     Carotid bulb present and described as homogeneous. The right CCA has mild and heterogeneous plaque. The right ICA has mild and homogeneous plaque. The right ECA is patent. The right vertebral is antegrade. There is no stenosis in the right vertebral artery. The right subclavian is normal.   Left Carotid     Carotid bulb present and described as calcific. The left CCA has mild and homogeneous plaque. The left ICA has mild and heterogeneous plaque.  The left ECA is patent. The left vertebral is antegrade. There is no stenosis in the left vertebral artery. The left subclavian is normal.     ECHO Pending  Recent Labs     10/30/19  0039 10/28/19  1840   WBC 4.3 3.2*   HGB 13.5 14.1   HCT 40.5 43.5   PLT 67* 66*     Recent Labs     10/30/19  0039 10/28/19  1840    143   K 4.0 4.6   * 110*   CO2 24 30   BUN 16 18   CREA 0.83 1.03   GLU 81 90   CA 8.7 9.2     Recent Labs     10/30/19  0039 10/28/19  1840   SGOT 30 40*   ALT 12 13   * 135*   TBILI 0.9 0.8   TP 6.5 7.2   ALB 3.6 4.0   GLOB 2.9 3.2     Recent Labs     10/28/19  1840   INR 1.4*   PTP 13.9*   APTT 29.5        Lab Results   Component Value Date/Time    Glucose (POC) 82 10/28/2019 06:26 PM    Glucose (POC) 148 (H) 07/10/2015 08:24 PM       HOSPITAL COURSE & TREATMENT RENDERED:   1.  TIA (transient ischemic attack)- possible.        -  Order neurovascular checks and fall precautions       -  Consult neurologist        -  Order 2d echocardiogram- pending       -  Continue Aspirin and Eliquis       -  Carotid dopplers neg     2.  Slurred speech        -  Patient reportedly passed dysphagia screen.         -  Resume home medications     3.  Generalized abdominal pain       -  CT abdomen/ pelvis was unremarkable.  Patient has chronic left inguinal hernia.  May follow up as outpatient with general surgeon.        - provide pain management prn while inpatient     4.  Long term anticoagulation.  On Eliquis.  Has h/o atrial flutter.       -  Continue Eliquis     5.  Parkinson's disease       - continue Sinemet     6.  Hyperlipidemia       -  Continue statin therapy       -  Check lipid panel     7.  VTE prophylaxis       -  Already on Eliquis     8.  History of dementia. Per family report. Mandi Root at current, patient is A&OX3.       -  Continue Aricept     Code status:  Full code         Subjective:     78 y. o. white male with past medical history of Parkinson's disease, MI, vitamin D deficiency, dementia presented to the ED from home with chief complaint of slurred speech and abdominal pain.  Primarily however, patient's daughter and son-in-law brought patient to the ED tonight after they notices that the patient's speech was slurred.  When asked, the patient notes that his speech has been slurred for ~ the past 2 weeks.  Symptoms remained constant, severe, without specific aggravating or alleviating factors.  He reports no history of strokes of similar symptom in the past. Tameka Leyva reportedly was recently started on Aricept for dementia. Tameka Leyva denies any recent head/ neck trauma or falls.  Patient admits to having abdominal located in bilateral lower quadrants.  He reportedly has a chronic large left inguinal hernia with chronic LLQ pain but his RLQ pain was notedly new.  Pain now is rated as 0/10. There were no reports of new onset syncope, loss of consciousness, headache, neck pain, visual disturbance, numbness, paresthesias, focal weakness, chest pain, palpitations, nausea, vomiting, diarrhea, melena, dysuria, hematuria, calf pain, increased leg swelling/ edema, fever, chills, or rash. (Dr Ingrid Matamoros)     10/29:  He reports sx have resolved. No current complaints except his tremor - did not get AM dose of Sinemet yet. Neuro to see today. Some results still pending. Unable to get MRI due to pacer.       10/30: Symptoms have resolved. CT head ok. Has not had ECHO yet so need this today and will get carotid Dopplers.  Wants to go home.      Review of Systems:   A comprehensive review of systems was negative except for that written in the HPI.     Objective:   Physical Exam:      Visit Vitals  /87 (BP 1 Location: Right arm, BP Patient Position: Post activity)   Pulse 61   Temp 97.4 °F (36.3 °C)   Resp 18   Ht 5' 4\" (1.626 m)   Wt 137 lb (62.1 kg)   SpO2 96%   BMI 23.52 kg/m²      O2 Device: Room air     Temp (24hrs), Av.8 °F (36.6 °C), Min:97.4 °F (36.3 °C), Max:98.2 °F (36.8 °C)    No intake/output data recorded. 10/28 1901 - 10/30 0700  In: 1541.3 [P.O.:120; I.V.:1421.3]  Out: 200 [Urine:200]     General:  Alert, cooperative, no distress, appears stated age. Head:  Normocephalic, without obvious abnormality, atraumatic. Eyes:  Conjunctivae/corneas clear. EOMs intact. Nose: Nares normal. Septum midline. Mucosa normal. No drainage or sinus tenderness. Throat: Lips, mucosa, and tongue moist..   Neck: Supple, symmetrical, trachea midline, no adenopathy, thyroid: no enlargement/tenderness/nodules, no carotid bruit and no JVD. Back:   Symmetric, no curvature. ROM normal. No CVA tenderness. Lungs:   Clear to auscultation bilaterally. Chest wall:  No tenderness or deformity. Heart:  Regular rate and rhythm, S1, S2 normal, no murmur, click, rub or gallop. Abdomen:   Soft, non-tender. Bowel sounds normal. No masses,  No organomegaly. Extremities: no cyanosis or edema. No calf tenderness or cords. Pulses: 2+ and symmetric all extremities. Skin: , turgor normal. No rashes    Neurologic:   Alert and oriented X 3. Tremor of both hands, semi pill rolling. Perhaps a slight facial mask. Slowed motor overall. Speech normal for him - sl slow but no slurring.  equal.  No cogwheeling or rigidity. Gait not tested at this time. Sensation grossly normal to touch.   Gross motor          Signed:  Radha Armenta MD  10/30/2019  3:45 PM

## 2019-10-30 NOTE — PROGRESS NOTES
Daily Care management update:    Possible TIA, echo and carotid doppler studies pending. Await results and recommendations regarding post discharge needs. Anticipate home with home health  Vs home with family assistance. Transition of Care Plan:     1. Hospital admission OBS for medical management, PT/OT/Speech evals, neurology consult    2. CM to follow  3. ACP planning  4. D/C when stable  Plan A: home w/ family assistance, HH, PCP and neurology f/u  Plan B: home w/ family assistance, PCP and neurology f/u  5.  Dtr Evonne Peters to transport       Nessa Tracy, RN, MSN/Care manager

## 2019-10-30 NOTE — DISCHARGE INSTRUCTIONS
Patient Discharge Instructions    Yasmin Tucker / [id number] : 1940    Admitted 10/28/2019 Discharged: 10/30/2019 3:45 PM     ACUTE DIAGNOSES:  Slurred speech [R47.81]  TIA (transient ischemic attack) [G45.9]    CHRONIC MEDICAL DIAGNOSES:  Problem List as of 10/30/2019 Date Reviewed: 3/5/2019          Codes Class Noted - Resolved    TIA (transient ischemic attack) ICD-10-CM: G45.9  ICD-9-CM: 435.9  10/28/2019 - Present        Slurred speech ICD-10-CM: R47.81  ICD-9-CM: 784.59  10/28/2019 - Present        Vitamin D deficiency ICD-10-CM: E55.9  ICD-9-CM: 268.9  2017 - Present        Atypical atrial flutter ([facility] [address] ICD-10-CM: I48.4  ICD-9-CM: 427.32  2017 - Present    Overview Signed 2017 11:18 AM by Akanksha PRO     S/p MI 7/15             S/P ICD (internal cardiac defibrillator) procedure ICD-10-CM: Z95.810  ICD-9-CM: V45.02  3/1/2016 - Present    Overview Signed 3/1/2016  3:05 PM by Louella Goodell, MD     Dual chamber [employer] mini ICD implant with DFT < 26 J 3/1/2016             Ischemic cardiomyopathy ICD-10-CM: I25.5  ICD-9-CM: 414.8  2015 - Present        Medication intolerance ICD-10-CM: Z78.9  ICD-9-CM: 995.27  2015 - Present        CAD (coronary artery disease) ICD-10-CM: I25.10  ICD-9-CM: 414.00  7/10/2015 - Present    Overview Signed 7/10/2015  9:59 PM by Christa Vaz MD     cabg times 3 chip 6/96, with subsequent stents lcx.              Parkinson's disease ([facility] [address] ICD-10-CM: G20  ICD-9-CM: 332.0  7/10/2015 - Present        Hyperlipidemia ICD-10-CM: E78.5  ICD-9-CM: 272.4  7/10/2015 - Present        STEMI (ST elevation myocardial infarction) ([facility] [address] ICD-10-CM: I21.3  ICD-9-CM: 410.90  7/10/2015 - Present        Heart block ICD-10-CM: I45.9  ICD-9-CM: 426.9  7/10/2015 - Present        Abdominal aortic aneurysm [facility]) ICD-10-CM: I71.4  ICD-9-CM: 441.4  7/10/2015 - Present    Overview Signed 7/10/2015 10:16 PM by Christa Vaz MD     Endograft, details unknown. DISCHARGE MEDICATIONS:         · It is important that you take the medication exactly as they are prescribed. · Keep your medication in the bottles provided by the pharmacist and keep a list of the medication names, dosages, and times to be taken in your wallet. · Do not take other medications without consulting your doctor. DIET:  Cardiac Diet    ACTIVITY: Activity as tolerated    ADDITIONAL INFORMATION: If you experience any of the following symptoms then please call your primary care physician or return to the emergency room if you cannot get hold of your doctor: Fever, chills, nausea, vomiting, diarrhea, change in mentation, falling, bleeding, shortness of breath. FOLLOW UP CARE:  Dr. Archie Mahoney, MD  you are to call and set up an appointment to see them with in 1 week. Follow-up with specialists at directed by them      Information obtained by :  I understand that if any problems occur once I am at home I am to contact my physician. I understand and acknowledge receipt of the instructions indicated above.                                                                                                                                            Physician's or R.N.'s Signature                                                                  Date/Time                                                                                                                                              Patient or Representative Signature                                                          Date/Time

## 2019-10-30 NOTE — ROUTINE PROCESS
Bedside and Verbal shift change report given to Porfirio Wasserman RN (oncoming nurse) by Tess Rodriguez RN (offgoing nurse). Report included the following information SBAR, Kardex, Intake/Output, MAR, Accordion and Recent Results.

## 2019-10-30 NOTE — PROGRESS NOTES
Discharge instructions were reviewed with patient and his wife. All questions were answered. IV and heart monitor were removed. Patient received a copy of his discharge papers and will be discharged home with his family.

## 2019-10-30 NOTE — PROGRESS NOTES
Daily Progress Note: 10/30/2019  Layla Casey MD    Assessment/Plan:   1. TIA (transient ischemic attack)- possible. -  Order neurovascular checks and fall precautions       -  Consult neurologist        -  Order 2d echocardiogram- pending       -  Continue Aspirin and Eliquis       -  Carotid dopplers neg     2. Slurred speech        -  Patient reportedly passed dysphagia screen. -  Resume home medications     3.  Generalized abdominal pain       -  CT abdomen/ pelvis was unremarkable. Patient has chronic left inguinal hernia. May follow up as outpatient with general surgeon. - provide pain management prn while inpatient     4.  Long term anticoagulation. On Eliquis. Has h/o atrial flutter. -  Continue Eliquis     5.  Parkinson's disease       - continue Sinemet     6. Hyperlipidemia       -  Continue statin therapy       -  Check lipid panel     7. VTE prophylaxis       -  Already on Eliquis     8. History of dementia. Per family report.   Albeit at current, patient is A&OX3.       -  Continue Aricept     Code status:  Full code        Problem List:  Problem List as of 10/30/2019 Date Reviewed: 3/5/2019          Codes Class Noted - Resolved    TIA (transient ischemic attack) ICD-10-CM: G45.9  ICD-9-CM: 435.9  10/28/2019 - Present        Slurred speech ICD-10-CM: R47.81  ICD-9-CM: 784.59  10/28/2019 - Present        Vitamin D deficiency ICD-10-CM: E55.9  ICD-9-CM: 268.9  5/5/2017 - Present        Atypical atrial flutter ([facility] [address] ICD-10-CM: I48.4  ICD-9-CM: 427.32  5/5/2017 - Present    Overview Signed 5/5/2017 11:18 AM by Eulene Meigs R     S/p MI 7/15             S/P ICD (internal cardiac defibrillator) procedure ICD-10-CM: Z95.810  ICD-9-CM: V45.02  3/1/2016 - Present    Overview Signed 3/1/2016  3:05 PM by Edwardo Calloway MD     Dual chamber [employer] mini ICD implant with DFT < 26 J 3/1/2016             Ischemic cardiomyopathy ICD-10-CM: I25.5  ICD-9-CM: 414.8  11/11/2015 - Present        Medication intolerance ICD-10-CM: Z78.9  ICD-9-CM: 995.27  7/29/2015 - Present        CAD (coronary artery disease) ICD-10-CM: I25.10  ICD-9-CM: 414.00  7/10/2015 - Present    Overview Signed 7/10/2015  9:59 PM by Sarah Doherty MD     cabg times 3 chip 6/96, with subsequent stents lcx. Parkinson's disease ([facility] ICD-10-CM: G20  ICD-9-CM: 332.0  7/10/2015 - Present        Hyperlipidemia ICD-10-CM: E78.5  ICD-9-CM: 272.4  7/10/2015 - Present        STEMI (ST elevation myocardial infarction) ([facility] ICD-10-CM: I21.3  ICD-9-CM: 410.90  7/10/2015 - Present        Heart block ICD-10-CM: I45.9  ICD-9-CM: 426.9  7/10/2015 - Present        Abdominal aortic aneurysm [facility]) ICD-10-CM: I71.4  ICD-9-CM: 441.4  7/10/2015 - Present    Overview Signed 7/10/2015 10:16 PM by Sarah Doherty MD     Endograft, details unknown. Subjective:     78 y.o. white male with past medical history of Parkinson's disease, MI, vitamin D deficiency, dementia presented to the ED from home with chief complaint of slurred speech and abdominal pain. Primarily however, patient's daughter and son-in-law brought patient to the ED tonight after they notices that the patient's speech was slurred. When asked, the patient notes that his speech has been slurred for ~ the past 2 weeks. Symptoms remained constant, severe, without specific aggravating or alleviating factors. He reports no history of strokes of similar symptom in the past.  He reportedly was recently started on Aricept for dementia. He denies any recent head/ neck trauma or falls. Patient admits to having abdominal located in bilateral lower quadrants. He reportedly has a chronic large left inguinal hernia with chronic LLQ pain but his RLQ pain was notedly new. Pain now is rated as 0/10.  There were no reports of new onset syncope, loss of consciousness, headache, neck pain, visual disturbance, numbness, paresthesias, focal weakness, chest pain, palpitations, nausea, vomiting, diarrhea, melena, dysuria, hematuria, calf pain, increased leg swelling/ edema, fever, chills, or rash. (Dr Coco Rodgers)    10/29:  He reports sx have resolved. No current complaints except his tremor - did not get AM dose of Sinemet yet. Neuro to see today. Some results still pending. Unable to get MRI due to pacer. 10/30: Symptoms have resolved. CT head ok. Has not had ECHO yet so need this today and will get carotid Dopplers. Wants to go home. Review of Systems:   A comprehensive review of systems was negative except for that written in the HPI. Objective:   Physical Exam:     Visit Vitals  /87 (BP 1 Location: Right arm, BP Patient Position: Post activity)   Pulse 61   Temp 97.4 °F (36.3 °C)   Resp 18   Ht 5' 4\" (1.626 m)   Wt 137 lb (62.1 kg)   SpO2 96%   BMI 23.52 kg/m²      O2 Device: Room air    Temp (24hrs), Av.8 °F (36.6 °C), Min:97.4 °F (36.3 °C), Max:98.2 °F (36.8 °C)    No intake/output data recorded. 10/28 190 - 10/30 0700  In: 1541.3 [P.O.:120; I.V.:1421.3]  Out: 200 [Urine:200]    General:  Alert, cooperative, no distress, appears stated age. Head:  Normocephalic, without obvious abnormality, atraumatic. Eyes:  Conjunctivae/corneas clear. EOMs intact. Nose: Nares normal. Septum midline. Mucosa normal. No drainage or sinus tenderness. Throat: Lips, mucosa, and tongue moist..   Neck: Supple, symmetrical, trachea midline, no adenopathy, thyroid: no enlargement/tenderness/nodules, no carotid bruit and no JVD. Back:   Symmetric, no curvature. ROM normal. No CVA tenderness. Lungs:   Clear to auscultation bilaterally. Chest wall:  No tenderness or deformity. Heart:  Regular rate and rhythm, S1, S2 normal, no murmur, click, rub or gallop. Abdomen:   Soft, non-tender. Bowel sounds normal. No masses,  No organomegaly. Extremities: no cyanosis or edema. No calf tenderness or cords. Pulses: 2+ and symmetric all extremities. Skin: , turgor normal. No rashes    Neurologic:   Alert and oriented X 3. Tremor of both hands, semi pill rolling. Perhaps a slight facial mask. Slowed motor overall. Speech normal for him - sl slow but no slurring.  equal.  No cogwheeling or rigidity. Gait not tested at this time. Sensation grossly normal to touch. Gross motor of extremities normal.       Data Review:       Recent Days:  Recent Labs     10/30/19  0039 10/28/19  1840   WBC 4.3 3.2*   HGB 13.5 14.1   HCT 40.5 43.5   PLT 67* 66*     Recent Labs     10/30/19  0039 10/28/19  1840    143   K 4.0 4.6   * 110*   CO2 24 30   GLU 81 90   BUN 16 18   CREA 0.83 1.03   CA 8.7 9.2   ALB 3.6 4.0   TBILI 0.9 0.8   SGOT 30 40*   ALT 12 13   INR  --  1.4*     No results for input(s): PH, PCO2, PO2, HCO3, FIO2 in the last 72 hours. 24 Hour Results:  Recent Results (from the past 24 hour(s))   METABOLIC PANEL, COMPREHENSIVE    Collection Time: 10/30/19 12:39 AM   Result Value Ref Range    Sodium 141 136 - 145 mmol/L    Potassium 4.0 3.5 - 5.1 mmol/L    Chloride 110 (H) 97 - 108 mmol/L    CO2 24 21 - 32 mmol/L    Anion gap 7 5 - 15 mmol/L    Glucose 81 65 - 100 mg/dL    BUN 16 6 - 20 MG/DL    Creatinine 0.83 0.70 - 1.30 MG/DL    BUN/Creatinine ratio 19 12 - 20      GFR est AA >60 >60 ml/min/1.73m2    GFR est non-AA >60 >60 ml/min/1.73m2    Calcium 8.7 8.5 - 10.1 MG/DL    Bilirubin, total 0.9 0.2 - 1.0 MG/DL    ALT (SGPT) 12 12 - 78 U/L    AST (SGOT) 30 15 - 37 U/L    Alk.  phosphatase 127 (H) 45 - 117 U/L    Protein, total 6.5 6.4 - 8.2 g/dL    Albumin 3.6 3.5 - 5.0 g/dL    Globulin 2.9 2.0 - 4.0 g/dL    A-G Ratio 1.2 1.1 - 2.2     CBC WITH AUTOMATED DIFF    Collection Time: 10/30/19 12:39 AM   Result Value Ref Range    WBC 4.3 4.1 - 11.1 K/uL    RBC 4.31 4.10 - 5.70 M/uL    HGB 13.5 12.1 - 17.0 g/dL    HCT 40.5 36.6 - 50.3 %    MCV 94.0 80.0 - 99.0 FL    MCH 31.3 26.0 - 34.0 PG    MCHC 33.3 30.0 - 36.5 g/dL    RDW 14.9 (H) 11.5 - 14.5 %    PLATELET 67 (L) 369 - 400 K/uL    MPV 12.0 8.9 - 12.9 FL    NRBC 0.0 0  WBC    ABSOLUTE NRBC 0.00 0.00 - 0.01 K/uL    NEUTROPHILS 63 32 - 75 %    LYMPHOCYTES 17 12 - 49 %    MONOCYTES 10 5 - 13 %    EOSINOPHILS 8 (H) 0 - 7 %    BASOPHILS 1 0 - 1 %    IMMATURE GRANULOCYTES 0 0.0 - 0.5 %    ABS. NEUTROPHILS 2.7 1.8 - 8.0 K/UL    ABS. LYMPHOCYTES 0.7 (L) 0.8 - 3.5 K/UL    ABS. MONOCYTES 0.4 0.0 - 1.0 K/UL    ABS. EOSINOPHILS 0.4 0.0 - 0.4 K/UL    ABS. BASOPHILS 0.1 0.0 - 0.1 K/UL    ABS. IMM. GRANS. 0.0 0.00 - 0.04 K/UL    DF AUTOMATED         Medications reviewed  Current Facility-Administered Medications   Medication Dose Route Frequency    apixaban (ELIQUIS) tablet 5 mg  5 mg Oral BID    aspirin delayed-release tablet 81 mg  81 mg Oral DAILY    carbidopa-levodopa (SINEMET)  mg per tablet 2 Tab  2 Tab Oral TID    rosuvastatin (CRESTOR) tablet 20 mg  20 mg Oral QHS    carvedilol (COREG) tablet 6.25 mg  6.25 mg Oral BID WITH MEALS       Care Plan discussed with: Patient and Nurse/Son  Total time spent with patient: 30 minutes.   Alisia Boyd MD

## 2019-11-01 LAB
LEFT CCA DIST DIAS: 11.1 CM/S
LEFT CCA DIST SYS: 62.9 CM/S
LEFT CCA PROX DIAS: 11 CM/S
LEFT CCA PROX SYS: 87.8 CM/S
LEFT ECA DIAS: 0 CM/S
LEFT ECA SYS: 60.3 CM/S
LEFT ICA DIST DIAS: 15.9 CM/S
LEFT ICA DIST SYS: 61.3 CM/S
LEFT ICA MID DIAS: 17.5 CM/S
LEFT ICA MID SYS: 58 CM/S
LEFT ICA PROX DIAS: 15 CM/S
LEFT ICA PROX SYS: 94 CM/S
LEFT ICA/CCA SYS: 1.49
LEFT SUBCLAVIAN DIAS: 0 CM/S
LEFT SUBCLAVIAN SYS: 131.3 CM/S
LEFT VERTEBRAL DIAS: 8.35 CM/S
LEFT VERTEBRAL SYS: 34.7 CM/S
RIGHT CCA DIST DIAS: 12.5 CM/S
RIGHT CCA DIST SYS: 59.5 CM/S
RIGHT CCA PROX DIAS: 8.6 CM/S
RIGHT CCA PROX SYS: 60.8 CM/S
RIGHT ECA DIAS: 0 CM/S
RIGHT ECA SYS: 72.5 CM/S
RIGHT ICA DIST DIAS: 11 CM/S
RIGHT ICA DIST SYS: 39.8 CM/S
RIGHT ICA MID DIAS: 17.8 CM/S
RIGHT ICA MID SYS: 69.4 CM/S
RIGHT ICA PROX DIAS: 16.4 CM/S
RIGHT ICA PROX SYS: 66 CM/S
RIGHT ICA/CCA SYS: 1.2
RIGHT SUBCLAVIAN DIAS: 0 CM/S
RIGHT SUBCLAVIAN SYS: 80.7 CM/S
RIGHT VERTEBRAL DIAS: 5.76 CM/S
RIGHT VERTEBRAL SYS: 32.8 CM/S

## 2019-11-04 ENCOUNTER — HOSPITAL ENCOUNTER (EMERGENCY)
Age: 79
Discharge: HOME | End: 2019-11-04
Attending: EMERGENCY MEDICINE
Payer: MEDICARE

## 2019-11-04 VITALS
BODY MASS INDEX: 24.27 KG/M2 | WEIGHT: 137 LBS | HEIGHT: 63 IN | DIASTOLIC BLOOD PRESSURE: 56 MMHG | RESPIRATION RATE: 16 BRPM | SYSTOLIC BLOOD PRESSURE: 107 MMHG | OXYGEN SATURATION: 94 % | HEART RATE: 76 BPM | TEMPERATURE: 98.4 F

## 2019-11-04 DIAGNOSIS — K92.2 LOWER GI BLEEDING: Primary | ICD-10-CM

## 2019-11-04 DIAGNOSIS — Z79.01 ANTICOAGULATED BY ANTICOAGULATION TREATMENT: ICD-10-CM

## 2019-11-04 DIAGNOSIS — R10.32 ABDOMINAL PAIN, LLQ (LEFT LOWER QUADRANT): ICD-10-CM

## 2019-11-04 DIAGNOSIS — K40.90 LEFT INGUINAL HERNIA: ICD-10-CM

## 2019-11-04 LAB
ALBUMIN SERPL-MCNC: 3.8 G/DL (ref 3.5–5)
ALBUMIN/GLOB SERPL: 1.2 {RATIO} (ref 1.1–2.2)
ALP SERPL-CCNC: 122 U/L (ref 45–117)
ALT SERPL-CCNC: 13 U/L (ref 12–78)
ANION GAP SERPL CALC-SCNC: 4 MMOL/L (ref 5–15)
AST SERPL-CCNC: 32 U/L (ref 15–37)
BASOPHILS # BLD: 0.1 K/UL (ref 0–0.1)
BASOPHILS NFR BLD: 1 % (ref 0–1)
BILIRUB SERPL-MCNC: 0.8 MG/DL (ref 0.2–1)
BUN SERPL-MCNC: 29 MG/DL (ref 6–20)
BUN/CREAT SERPL: 27 (ref 12–20)
CALCIUM SERPL-MCNC: 9 MG/DL (ref 8.5–10.1)
CHLORIDE SERPL-SCNC: 107 MMOL/L (ref 97–108)
CO2 SERPL-SCNC: 26 MMOL/L (ref 21–32)
COMMENT, HOLDF: NORMAL
CREAT SERPL-MCNC: 1.06 MG/DL (ref 0.7–1.3)
DIFFERENTIAL METHOD BLD: ABNORMAL
EOSINOPHIL # BLD: 0.5 K/UL (ref 0–0.4)
EOSINOPHIL NFR BLD: 7 % (ref 0–7)
ERYTHROCYTE [DISTWIDTH] IN BLOOD BY AUTOMATED COUNT: 15 % (ref 11.5–14.5)
GLOBULIN SER CALC-MCNC: 3.1 G/DL (ref 2–4)
GLUCOSE SERPL-MCNC: 84 MG/DL (ref 65–100)
HCT VFR BLD AUTO: 40.5 % (ref 36.6–50.3)
HEMOCCULT STL QL: POSITIVE
HGB BLD-MCNC: 13.2 G/DL (ref 12.1–17)
IMM GRANULOCYTES # BLD AUTO: 0 K/UL (ref 0–0.04)
IMM GRANULOCYTES NFR BLD AUTO: 0 % (ref 0–0.5)
LIPASE SERPL-CCNC: 171 U/L (ref 73–393)
LYMPHOCYTES # BLD: 0.7 K/UL (ref 0.8–3.5)
LYMPHOCYTES NFR BLD: 10 % (ref 12–49)
MCH RBC QN AUTO: 31.3 PG (ref 26–34)
MCHC RBC AUTO-ENTMCNC: 32.6 G/DL (ref 30–36.5)
MCV RBC AUTO: 96 FL (ref 80–99)
MONOCYTES # BLD: 0.4 K/UL (ref 0–1)
MONOCYTES NFR BLD: 6 % (ref 5–13)
NEUTS SEG # BLD: 4.9 K/UL (ref 1.8–8)
NEUTS SEG NFR BLD: 76 % (ref 32–75)
NRBC # BLD: 0 K/UL (ref 0–0.01)
NRBC BLD-RTO: 0 PER 100 WBC
PLATELET # BLD AUTO: 83 K/UL (ref 150–400)
PMV BLD AUTO: 10.7 FL (ref 8.9–12.9)
POTASSIUM SERPL-SCNC: 4.2 MMOL/L (ref 3.5–5.1)
PROT SERPL-MCNC: 6.9 G/DL (ref 6.4–8.2)
RBC # BLD AUTO: 4.22 M/UL (ref 4.1–5.7)
RBC MORPH BLD: ABNORMAL
SAMPLES BEING HELD,HOLD: NORMAL
SODIUM SERPL-SCNC: 137 MMOL/L (ref 136–145)
WBC # BLD AUTO: 6.6 K/UL (ref 4.1–11.1)

## 2019-11-04 PROCEDURE — 80053 COMPREHEN METABOLIC PANEL: CPT

## 2019-11-04 PROCEDURE — 85025 COMPLETE CBC W/AUTO DIFF WBC: CPT

## 2019-11-04 PROCEDURE — 83690 ASSAY OF LIPASE: CPT

## 2019-11-04 PROCEDURE — 36415 COLL VENOUS BLD VENIPUNCTURE: CPT

## 2019-11-04 PROCEDURE — 99282 EMERGENCY DEPT VISIT SF MDM: CPT

## 2019-11-04 PROCEDURE — 82272 OCCULT BLD FECES 1-3 TESTS: CPT

## 2019-11-04 RX ORDER — CALCIUM POLYCARBOPHIL 625 MG
625 TABLET ORAL DAILY
Qty: 30 TAB | Refills: 0 | Status: SHIPPED | OUTPATIENT
Start: 2019-11-04 | End: 2021-09-15

## 2019-11-04 RX ORDER — BISMUTH SUBSALICYLATE 262 MG/15ML
30 LIQUID ORAL
Qty: 354 ML | Refills: 0 | Status: SHIPPED | OUTPATIENT
Start: 2019-11-04 | End: 2021-10-20

## 2019-11-04 NOTE — ED PROVIDER NOTES
78 y.o. male with past medical history significant for parkinson's disease, MI, TIA, and A-fib who presents via private vehicle with chief complaint of abdominal pain. Pt c/o generalized abdominal pain is worsened increased with exertion that he rates 6/10, accompanied by bloody diarrhea. Pt reports he had to use the bathroom 12 times last night. The blood in his stool is dark red. He has not yet tried any medication to treat his sx. Pt is anticoagulated on eliquis. He has an internal defibrillator and hx of A-fib. He has a distended L inguinal hernia and has not seen a surgeon regarding it. He was recently hospitalized this past Monday (10/28/19) until Wednesday (10/30/19) with abdominal pain and TIA . There are no other acute medical concerns at this time. Social hx: denies tobacco use, EtOH    PCP: Bindu To MD      Note written by Ronaldo Concepcion, as dictated by Manny Rojas MD 9:36 AM      The history is provided by the patient and a relative. No  was used. Past Medical History:   Diagnosis Date    Ill-defined condition     MI    Parkinson's disease ([facility] [address]     Vitamin D deficiency 5/5/2017       Past Surgical History:   Procedure Laterality Date    CARDIAC SURG PROCEDURE UNLIST      HX ORTHOPAEDIC      L hip replacement, R shoulder replacement.  HX PACEMAKER      INS PPM/ICD LED DUAL ONLY  7/13/2015         INS PPM/ICD LED DUAL ONLY  3/1/2016              No family history on file.     Social History     Socioeconomic History    Marital status:      Spouse name: Not on file    Number of children: Not on file    Years of education: Not on file    Highest education level: Not on file   Occupational History    Not on file   Social Needs    Financial resource strain: Not on file    Food insecurity:     Worry: Not on file     Inability: Not on file    Transportation needs:     Medical: Not on file     Non-medical: Not on file   Tobacco Use  Smoking status: Never Smoker    Smokeless tobacco: Never Used   Substance and Sexual Activity    Alcohol use: No    Drug use: No    Sexual activity: Not on file   Lifestyle    Physical activity:     Days per week: Not on file     Minutes per session: Not on file    Stress: Not on file   Relationships    Social connections:     Talks on phone: Not on file     Gets together: Not on file     Attends [employer]ical service: Not on file     Active member of club or organization: Not on file     Attends meetings of clubs or organizations: Not on file     Relationship status: Not on file    Intimate partner violence:     Fear of current or ex partner: Not on file     Emotionally abused: Not on file     Physically abused: Not on file     Forced sexual activity: Not on file   Other Topics Concern    Not on file   Social History Narrative    Not on file         ALLERGIES: Patient has no known allergies. Review of Systems   Gastrointestinal: Positive for abdominal pain, blood in stool and diarrhea. All other systems reviewed and are negative. Vitals:    11/04/19 0848   BP: 107/56   Pulse: 76   Resp: 16   Temp: 98.4 °F (36.9 °C)   SpO2: 96%   Weight: 62.1 kg (137 lb)   Height: 5' 3\" (1.6 m)            Physical Exam   Constitutional: He appears well-developed and well-nourished. No distress. HENT:   Head: Normocephalic and atraumatic. Eyes: Conjunctivae are normal.   Neck: Neck supple. No tracheal deviation present. Cardiovascular: Normal rate and regular rhythm. Pulmonary/Chest: Effort normal. No respiratory distress. Abdominal: He exhibits no distension. There is tenderness (mild) in the left lower quadrant. A hernia is present. Hernia confirmed positive in the left inguinal area (firm, distended, but easily reduced). Genitourinary: Rectal exam shows no external hemorrhoid and no internal hemorrhoid. Genitourinary Comments: Dark gross blood in rectum   Musculoskeletal: Normal range of motion. He exhibits no deformity. Neurological: He is alert. No cranial nerve deficit. Skin: Skin is warm and dry. Psychiatric: His behavior is normal.   Nursing note and vitals reviewed. Note written by Ronaldo Salomon, as dictated by Peyton Sawyer MD 9:59 AM      MDM     78-year-old male with history of atrial fibrillation presents with ongoing abdominal pain that he had during his entire hospitalization with distended and tender hernia in the left inguinal area. He has also noticed some dark red blood in his stools with progressive diarrhea since leaving the hospital. Despite ongoing symptoms of small-volume bleeding he continued to take his Eliquis but has not had any decrease in his hemoglobin from his baseline. He has no hemodynamic instability here. He is comfortable without any pain medication needs after bedside reduction of hernia. I recommended stopping his Eliquis for the next few days until he is cleared by either gastroenterology or his primary care physician to restart it since his yearly risk of stroke is less than his daily risk of bleeding at the moment. We discussed return precautions for worsening signs of bleeding, symptoms of anemia, or inability to control diarrhea at home with Pepto-Bismol and fiber supplementation for stool bulking.   He had imaging during his previous hospitalization with symptoms unchanged since then so I do not feel repeat imaging is necessary now with reassuring exam.     Procedures

## 2019-11-04 NOTE — ED TRIAGE NOTES
Patient c/o abdominal pain and blood in stool since Wednesday, discharged after tia from here on Wednesday, positive blood thinner.

## 2019-11-04 NOTE — DISCHARGE INSTRUCTIONS
Do not take your Eliquis for the next 3 days, until you are able to follow-up with primary care doctor, or are cleared by gastroenterology or your doctor to resume this medication.

## 2020-01-21 ENCOUNTER — OFFICE VISIT (OUTPATIENT)
Dept: CARDIOLOGY CLINIC | Age: 80
End: 2020-01-21

## 2020-01-21 VITALS
SYSTOLIC BLOOD PRESSURE: 130 MMHG | HEART RATE: 66 BPM | DIASTOLIC BLOOD PRESSURE: 68 MMHG | OXYGEN SATURATION: 97 % | WEIGHT: 140.8 LBS | RESPIRATION RATE: 18 BRPM | HEIGHT: 63 IN | BODY MASS INDEX: 24.95 KG/M2

## 2020-01-21 DIAGNOSIS — I25.10 CORONARY ARTERY DISEASE INVOLVING NATIVE CORONARY ARTERY OF NATIVE HEART WITHOUT ANGINA PECTORIS: ICD-10-CM

## 2020-01-21 DIAGNOSIS — I25.5 ISCHEMIC CARDIOMYOPATHY: ICD-10-CM

## 2020-01-21 DIAGNOSIS — I48.4 ATYPICAL ATRIAL FLUTTER (HCC): Primary | ICD-10-CM

## 2020-01-21 DIAGNOSIS — E78.00 PURE HYPERCHOLESTEROLEMIA: ICD-10-CM

## 2020-01-21 RX ORDER — MEMANTINE HYDROCHLORIDE 10 MG/1
TABLET ORAL
COMMUNITY
Start: 2019-12-24 | End: 2021-10-20

## 2020-01-21 RX ORDER — ROPINIROLE 1 MG/1
TABLET, FILM COATED ORAL
COMMUNITY
Start: 2020-01-08

## 2020-01-21 NOTE — PROGRESS NOTES
LAST OFFICE VISIT : Visit date not found        ICD-10-CM ICD-9-CM   1. Atypical atrial flutter (HCC) I48.4 427.32   2. Ischemic cardiomyopathy I25.5 414.8   3. Coronary artery disease involving native coronary artery of native heart without angina pectoris I25.10 414.01   4. Pure hypercholesterolemia E78.00 272.0            Quyen Thomas is a 78 y.o. male with CAD, STEMI, HLD, and atrial flutter referred for follow up. Cardiac risk factors: dyslipidemia, male gender, stress  I have personally obtained the history from the patient. HISTORY OF PRESENTING ILLNESS     Overall the pt states he is doing well. Pt notes that he has swelling on his left leg from his knee to his ankle. Pt also has pain in that leg around the ankle and foot. Pt also reports that he has been feeling fatigue, but notes that he has been feeling fatigue for a long time. Pt denies missing any Eliquis. The patient denies chest pain/ shortness of breath, orthopnea, PND, palpitations, syncope, presyncope.          ACTIVE PROBLEM LIST     Patient Active Problem List    Diagnosis Date Noted    TIA (transient ischemic attack) 10/28/2019    Slurred speech 10/28/2019    Vitamin D deficiency 05/05/2017    Atypical atrial flutter ([facility] [address] 05/05/2017    S/P ICD (internal cardiac defibrillator) procedure 03/01/2016    Ischemic cardiomyopathy 11/11/2015    Medication intolerance 07/29/2015    CAD (coronary artery disease) 07/10/2015    Parkinson's disease ([facility] [address] 07/10/2015    Hyperlipidemia 07/10/2015    STEMI (ST elevation myocardial infarction) ([address] 07/10/2015    Heart block 07/10/2015    Abdominal aortic aneurysm ([address] 07/10/2015           PAST MEDICAL HISTORY     Past Medical History:   Diagnosis Date    Ill-defined condition     MI    Parkinson's disease ([address]     Vitamin D deficiency 5/5/2017           PAST SURGICAL HISTORY     Past Surgical History:   Procedure Laterality Date    CARDIAC SURG PROCEDURE UNLIST      HX ORTHOPAEDIC      L hip replacement, R shoulder replacement.  HX PACEMAKER      INS PPM/ICD LED DUAL ONLY  7/13/2015         INS PPM/ICD LED DUAL ONLY  3/1/2016               ALLERGIES     No Known Allergies       FAMILY HISTORY     History reviewed. No pertinent family history. negative for cardiac disease       SOCIAL HISTORY     Social History     Socioeconomic History    Marital status:      Spouse name: Not on file    Number of children: Not on file    Years of education: Not on file    Highest education level: Not on file   Tobacco Use    Smoking status: Never Smoker    Smokeless tobacco: Never Used   Substance and Sexual Activity    Alcohol use: No    Drug use: No    Sexual activity: Not Currently         MEDICATIONS     Current Outpatient Medications   Medication Sig    memantine (NAMENDA) 10 mg tablet TAKE AS DIRECTED ONE HALF TABLET BY MOUTH DAILY FOR 1 WEEK THEN ONE HALF TABLET TWICE DAILY FOR 1 WEEK THEN ONE TABLET TWICE DAILY. DO NOT S    rOPINIRole (REQUIP) 1 mg tablet TAKE ONE HALF TO ONE TABLET BY MOUTH 1 TO 3 HOURS BEFORE BEDTIME ONCE DAILY    calcium polycarbophil (FIBERCON) 625 mg tablet Take 1 Tab by mouth daily.  bismuth subsalicylate (PEPTO-BISMOL) 262 mg/15 mL suspension Take 30 mL by mouth every four (4) hours as needed for Indigestion (diarrhea). May take 30 mL every 30 minutes as needed for up to 8 doses initially.  carvedilol (COREG) 6.25 mg tablet Take 6.25 mg by mouth two (2) times daily (with meals).  rosuvastatin (CRESTOR) 20 mg tablet Take 20 mg by mouth nightly.  apixaban (ELIQUIS) 5 mg tablet Take 1 Tab by mouth two (2) times a day.  aspirin delayed-release 81 mg tablet Take 1 Tab by mouth daily.  multivitamin (ONE A DAY) tablet Take 1 Tab by mouth daily.  coenzyme q10-vitamin e (COQ10 ) 100-100 mg-unit cap Take 1 Tab by mouth daily.  carbidopa-levodopa (SINEMET)  mg per tablet Take 2 Tabs by mouth three (3) times daily.     nitroglycerin (NITROSTAT) 0.4 mg SL tablet 1 Tab by SubLINGual route every five (5) minutes as needed for Chest Pain. No current facility-administered medications for this visit. I have reviewed the nurses notes, vitals, problem list, allergy list, medical history, family, social history and medications. REVIEW OF SYMPTOMS      General: Pt denies excessive weight gain or loss. Pt is able to conduct ADL's  HEENT: Denies blurred vision, headaches, hearing loss, epistaxis and difficulty swallowing. Respiratory: Denies cough, congestion, shortness of breath, SOTELO, wheezing or stridor. Cardiovascular: Denies precordial pain, palpitations, or PND positive for edema  Gastrointestinal: Denies poor appetite, indigestion, abdominal pain or blood in stool  Genitourinary: Denies hematuria, dysuria, increased urinary frequency  Musculoskeletal: Denies joint pain or swelling from muscles or joints  Neurologic: Denies tremor, paresthesias, headache, or sensory motor disturbance  Psychiatric: Denies confusion, insomnia, depression  Integumentray: Denies rash, itching or ulcers. Hematologic: Denies easy bruising, bleeding     PHYSICAL EXAMINATION      Vitals:    01/21/20 1554   BP: 130/68   Pulse: 66   Resp: 18   SpO2: 97%   Weight: 140 lb 12.8 oz (63.9 kg)   Height: 5' 3\" (1.6 m)     General: Well developed, in no acute distress. HEENT: No jaundice, oral mucosa moist, no oral ulcers  Neck: Supple, no stiffness, no lymphadenopathy, supple  Heart:  Normal S1/S2 negative S3 or S4. Regular, no murmur, gallop or rub, no jugular venous distention  Respiratory: Clear bilaterally x 4, no wheezing or rales  Abdomen:   Soft, non-tender, bowel sounds are active. Extremities:  1+ pitting edema  Musculoskeletal: No clubbing, no deformities  Neuro: A&Ox3, speech clear, gait stable, cooperative, no focal neurologic deficits  Skin: Skin color is normal. No rashes or lesions.  Non diaphoretic, moist.  Vascular: 2+ pulses symmetric in all extremities       DIAGNOSTIC DATA     1. Echo  7/11/15 - EF 35%, basilar septum AK, inferior wall and inferobasilar wall. RV mild reduced function, mild AI, TR   10/13/15 - EF 15-20%, mild MR, moderate TR, mild to moderate NM  6/15/18-EF 30%, RVE, RACHID, MR mild, TR mod/severe, PA pressure 33 mmHg, AV trileaflet show increase thickness and sclerosis  10/30/19- EF 05-15%, mod systolic dysfunction, pacer/ICD present in right ventricle, borderline low systolic function in right ventricle, mild AV leaflet calcification present with reduced excursion, mild AV stenosis, mild/moderate AR, MV thickening, mild MR, moderate TR, mild LAE, moderate KARRIE     2. Cardiac catheterization  7/10/15 - severe native CAD w/ all proxs occluded, LAD after severly diseased first diagonal, patent moderately diseased SVG to OM 1, 2, SVG to RCA - Degenerated occluded - large clot burden in graft and diffuse distal graft and native RCA disease. Patent LIMA to LAD, LAD 60% distal to graft insertion, faint L to R collaterals. Given diffuse disease in culprit 23year old graft, PCI was not done. 3. Cholesterol   7/13/15 - TC 98, HDL 36, LDL 42.2, TG 99  4/30/18- , HDL 34, , TG 81  10/29/19- TC 99, HDL 31, LDL 54.6, TG 67    4. Pacemaker placed 7/13/15  The pacemaker is a Medtronic Bright BENITES, model #ADDR [id number], serial [id number], atrial lead medtronic [id number], serial [id number], ventricular lead Medtronic model [id number] and serial [id number]    5. PERMANENT AICD INSERTION   DATE OF PROCEDURE: 3/1/2016 - Upgrade of the dual chamber pacemaker to dual chamber per Dr. Robert Orellana    6. Carotid Duplex  10/30/19- 0-49% stenosis bilaterally    7. Nuclear stress test  4/1/19- LV function is mild-to-moderately depressed with LVEF 42%. Abnormal septal wall motion and inferior wall hypokinesis. Large sized, high grade, fixed defect in the inferior wall without ischemia.  SSS 24, SRS 22, SDS 1.    8. LE Arterial PVR  6/19/19-Right:  No evidence of significant arterial occlusive disease         LABORATORY DATA            Lab Results   Component Value Date/Time    WBC 6.6 11/04/2019 09:23 AM    Hemoglobin (POC) 16.3 07/10/2015 08:24 PM    HGB 13.2 11/04/2019 09:23 AM    Hematocrit (POC) 48 07/10/2015 08:24 PM    HCT 40.5 11/04/2019 09:23 AM    PLATELET 83 (L) 27/84/5391 09:23 AM    MCV 96.0 11/04/2019 09:23 AM      Lab Results   Component Value Date/Time    Sodium 137 11/04/2019 09:23 AM    Potassium 4.2 11/04/2019 09:23 AM    Chloride 107 11/04/2019 09:23 AM    CO2 26 11/04/2019 09:23 AM    Anion gap 4 (L) 11/04/2019 09:23 AM    Glucose 84 11/04/2019 09:23 AM    BUN 29 (H) 11/04/2019 09:23 AM    Creatinine 1.06 11/04/2019 09:23 AM    BUN/Creatinine ratio 27 (H) 11/04/2019 09:23 AM    GFR est AA >60 11/04/2019 09:23 AM    GFR est non-AA >60 11/04/2019 09:23 AM    Calcium 9.0 11/04/2019 09:23 AM    Bilirubin, total 0.8 11/04/2019 09:23 AM    AST (SGOT) 32 11/04/2019 09:23 AM    Alk. phosphatase 122 (H) 11/04/2019 09:23 AM    Protein, total 6.9 11/04/2019 09:23 AM    Albumin 3.8 11/04/2019 09:23 AM    Globulin 3.1 11/04/2019 09:23 AM    A-G Ratio 1.2 11/04/2019 09:23 AM    ALT (SGPT) 13 11/04/2019 09:23 AM           ASSESSMENT/RECOMMENDATIONS:.      1. LE edema  - His left leg definitely has increased swelling compared to the right and he does have some discomfort. I know he is on Eliquis but I still favor doing an ultrasound of his left leg to ensure that he does not have a DVT. 2. CAD/ICM/fatigue  - He had anuclear stress test in April 2019 which demonstrated a fixed defect and EF 42%. There was no obvious reversible disease present  3. Atrial flutter  - Rhythm is regular at this time. He is also anticoagulated with no bleeding episodes. 4. HFrEF  - He is not complaining of SOB, but he does have some fatigue. I am unclear if that is related to his recently suppressed bone marrow. He is to see hematology next week.    5. Dyslipidemia  - Lipids are at goal on current medical regimen. - Followed by Simi Green MD   7. Hx of TIA  8. Return in 3 months or PRN. Orders Placed This Encounter    memantine (NAMENDA) 10 mg tablet     Sig: TAKE AS DIRECTED ONE HALF TABLET BY MOUTH DAILY FOR 1 WEEK THEN ONE HALF TABLET TWICE DAILY FOR 1 WEEK THEN ONE TABLET TWICE DAILY. DO NOT S    rOPINIRole (REQUIP) 1 mg tablet     Sig: TAKE ONE HALF TO ONE TABLET BY MOUTH 1 TO 3 HOURS BEFORE BEDTIME ONCE DAILY          Follow-up and Dispositions  ·   Return in about 6 months (around 7/21/2020). I have discussed the diagnosis with  [address] [place] [address] and the intended plan as seen in the above orders. Questions were answered concerning future plans. I have discussed medication side effects and warnings with the patient as well. Thank you,  Simi Green MD for involving me in the care of  [facility] [place]. Please do not hesitate to contact me for further questions/concerns. Written by Andre Hadley, as dictated by Anabel Apodaca MD.     Kaylynn Buckley MD, [facility]    Patient Care Team:  Simi Green MD as PCP - General (Family Practice)  Emanuel Robison MD as Physician (Cardiology)  Elaine Burrows MD as Physician (Cardiology)  Elizabeth Melendez MD (Orthopedic Surgery)  Keisha Wall MD as Physician (General and Vascular Surgery)    [facility] [address]      [address] [place] [address], [address] [place] [address] Drive      [phone] / [phone] Fax

## 2020-01-21 NOTE — PROGRESS NOTES
Rosette Matthews is a 78 y.o. male    Chief Complaint   Patient presents with    Follow-up     6 mo f/u, TIA, CAD    Cardiomyopathy    Coronary Artery Disease    Irregular Heart Beat       Chest pain no  SOB no  Dizziness no  Swelling no  Recent hospital visit no  Refills no  Visit Vitals  Resp 18   Ht 5' 3\" (1.6 m)   BMI 24.27 kg/m²

## 2020-01-21 NOTE — LETTER
1/23/20 Patient: Bill Tellez YOB: 1940 Date of Visit: 1/21/2020 Salo Garcia MD 
[address] [place] [address] VIA Facsimile: [phone] Dear Salo Garcia MD, Thank you for referring Mr. Raleigh Jacobson to [facility] for evaluation. My notes for this consultation are attached. If you have questions, please do not hesitate to call me. I look forward to following your patient along with you.  
 
 
Sincerely, 
 
Fanny Amato MD

## 2020-01-21 NOTE — PROGRESS NOTES
All health maintenance and other pertinent information has been reviewed in preparation for today's office visit. Patient presents in the office today for:    Chief Complaint   Patient presents with    Follow-up     6 mo f/u, TIA, CAD    Cardiomyopathy    Coronary Artery Disease    Irregular Heart Beat     1. Have you been to the ER, urgent care clinic since your last visit? Hospitalized since your last visit? Yes [facility] ED on 11/2019 for:    Complaint Comment   Abdominal Pain    Rectal Bleeding    Groin Pain      2. Have you seen or consulted any other health care providers outside of the [address] [place] [address] since your last visit? Include any pap smears or colon screening.  No      Visit Vitals  /68 (BP 1 Location: Left arm, BP Patient Position: Sitting)   Pulse 66   Resp 18   Ht 5' 3\" (1.6 m)   Wt 140 lb 12.8 oz (63.9 kg)   SpO2 97%   BMI 24.94 kg/m²

## 2020-01-30 ENCOUNTER — TELEPHONE (OUTPATIENT)
Dept: CARDIOLOGY CLINIC | Age: 80
End: 2020-01-30

## 2020-01-30 NOTE — TELEPHONE ENCOUNTER
Patients daughter is inquiring about the results of the patient vascular test from 1/21.     Phone: [phone]

## 2020-02-04 NOTE — TELEPHONE ENCOUNTER
MD Jose E Michael, ANNIE   Caller: Unspecified (5 days ago,  4:40 PM)             Called his daughter and left a message on her answering machine. ZI told her ab out the lymph node. Needs to see his primary care.     Previous Messages

## 2020-07-08 ENCOUNTER — CLINICAL SUPPORT (OUTPATIENT)
Dept: CARDIOLOGY CLINIC | Age: 80
End: 2020-07-08

## 2020-07-08 DIAGNOSIS — Z95.810 CARDIAC DEFIBRILLATOR IN SITU: Primary | ICD-10-CM

## 2021-03-26 ENCOUNTER — CLINICAL SUPPORT (OUTPATIENT)
Dept: CARDIOLOGY CLINIC | Age: 81
End: 2021-03-26
Payer: MEDICARE

## 2021-03-26 DIAGNOSIS — Z95.810 AUTOMATIC IMPLANTABLE CARDIAC DEFIBRILLATOR IN SITU: Primary | ICD-10-CM

## 2021-03-26 PROCEDURE — 93283 PRGRMG EVAL IMPLANTABLE DFB: CPT

## 2021-04-07 NOTE — PROGRESS NOTES
HISTORY OF PRESENTING ILLNESS      Narciso Valera is a 78 y.o. male with ischemic cardiomyopathy, CAD/CABG, prior myocardial infarction, atrial flutter, dyslipidemia and dual chamber Medtronic ICD (upgraded from dual chamber pacemaker) at [facility]. PAST MEDICAL HISTORY     Past Medical History:   Diagnosis Date    Ill-defined condition     MI    Parkinson's disease ([facility] [address]     Vitamin D deficiency 5/5/2017           PAST SURGICAL HISTORY     Past Surgical History:   Procedure Laterality Date    CARDIAC SURG PROCEDURE UNLIST      HX ORTHOPAEDIC      L hip replacement, R shoulder replacement.  HX PACEMAKER      INS PPM/ICD LED DUAL ONLY  7/13/2015         INS PPM/ICD LED DUAL ONLY  3/1/2016               ALLERGIES     No Known Allergies       FAMILY HISTORY     No family history on file. negative for cardiac disease       SOCIAL HISTORY     Social History     Socioeconomic History    Marital status:      Spouse name: Not on file    Number of children: Not on file    Years of education: Not on file    Highest education level: Not on file   Tobacco Use    Smoking status: Never Smoker    Smokeless tobacco: Never Used   Substance and Sexual Activity    Alcohol use: No    Drug use: No    Sexual activity: Not Currently         MEDICATIONS     Current Outpatient Medications   Medication Sig    memantine (NAMENDA) 10 mg tablet TAKE AS DIRECTED ONE HALF TABLET BY MOUTH DAILY FOR 1 WEEK THEN ONE HALF TABLET TWICE DAILY FOR 1 WEEK THEN ONE TABLET TWICE DAILY. DO NOT S    rOPINIRole (REQUIP) 1 mg tablet TAKE ONE HALF TO ONE TABLET BY MOUTH 1 TO 3 HOURS BEFORE BEDTIME ONCE DAILY    calcium polycarbophil (FIBERCON) 625 mg tablet Take 1 Tab by mouth daily.  bismuth subsalicylate (PEPTO-BISMOL) 262 mg/15 mL suspension Take 30 mL by mouth every four (4) hours as needed for Indigestion (diarrhea). May take 30 mL every 30 minutes as needed for up to 8 doses initially.     carvedilol (COREG) 6.25 mg tablet Take 6.25 mg by mouth two (2) times daily (with meals).  rosuvastatin (CRESTOR) 20 mg tablet Take 20 mg by mouth nightly.  apixaban (ELIQUIS) 5 mg tablet Take 1 Tab by mouth two (2) times a day.  nitroglycerin (NITROSTAT) 0.4 mg SL tablet 1 Tab by SubLINGual route every five (5) minutes as needed for Chest Pain.  aspirin delayed-release 81 mg tablet Take 1 Tab by mouth daily.  multivitamin (ONE A DAY) tablet Take 1 Tab by mouth daily.  coenzyme q10-vitamin e (COQ10 ) 100-100 mg-unit cap Take 1 Tab by mouth daily.  carbidopa-levodopa (SINEMET)  mg per tablet Take 2 Tabs by mouth three (3) times daily. No current facility-administered medications for this visit. I have reviewed the nurses notes, vitals, problem list, allergy list, medical history, family, social history and medications. REVIEW OF SYMPTOMS      General: Pt denies excessive weight gain or loss. Pt is able to conduct ADL's  HEENT: Denies blurred vision, headaches, hearing loss, epistaxis and difficulty swallowing. Respiratory: Denies cough, congestion, shortness of breath, SOTELO, wheezing or stridor. Cardiovascular: Denies precordial pain, palpitations, edema or PND  Gastrointestinal: Denies poor appetite, indigestion, abdominal pain or blood in stool  Genitourinary: Denies hematuria, dysuria, increased urinary frequency  Musculoskeletal: Denies joint pain or swelling from muscles or joints  Neurologic: Denies tremor, paresthesias, headache, or sensory motor disturbance  Psychiatric: Denies confusion, insomnia, depression  Integumentray: Denies rash, itching or ulcers. Hematologic: Denies easy bruising, bleeding       PHYSICAL EXAMINATION      Vitals: see vitals section  General: Well developed, in no acute distress. HEENT: No jaundice, oral mucosa moist, no oral ulcers  Neck: Supple, no stiffness, no lymphadenopathy, supple  Heart:  Normal S1/S2 negative S3 or S4.  Regular, no murmur, gallop or rub, no jugular venous distention  Respiratory: Clear bilaterally x 4, no wheezing or rales  Abdomen:   Soft, non-tender, bowel sounds are active. Extremities:  No edema, normal cap refill, no cyanosis. Musculoskeletal: No clubbing, no deformities  Neuro: A&Ox3, speech clear, gait stable, cooperative, no focal neurologic deficits  Skin: Skin color is normal. No rashes or lesions. Non diaphoretic, moist.  Vascular: 2+ pulses symmetric in all extremities       DIAGNOSTIC DATA      EKG:        LABORATORY DATA      Lab Results   Component Value Date/Time    WBC 6.6 11/04/2019 09:23 AM    Hemoglobin (POC) 16.3 07/10/2015 08:24 PM    HGB 13.2 11/04/2019 09:23 AM    Hematocrit (POC) 48 07/10/2015 08:24 PM    HCT 40.5 11/04/2019 09:23 AM    PLATELET 83 (L) 10/24/3644 09:23 AM    MCV 96.0 11/04/2019 09:23 AM      Lab Results   Component Value Date/Time    Sodium 137 11/04/2019 09:23 AM    Potassium 4.2 11/04/2019 09:23 AM    Chloride 107 11/04/2019 09:23 AM    CO2 26 11/04/2019 09:23 AM    Anion gap 4 (L) 11/04/2019 09:23 AM    Glucose 84 11/04/2019 09:23 AM    BUN 29 (H) 11/04/2019 09:23 AM    Creatinine 1.06 11/04/2019 09:23 AM    BUN/Creatinine ratio 27 (H) 11/04/2019 09:23 AM    GFR est AA >60 11/04/2019 09:23 AM    GFR est non-AA >60 11/04/2019 09:23 AM    Calcium 9.0 11/04/2019 09:23 AM    Bilirubin, total 0.8 11/04/2019 09:23 AM    Alk. phosphatase 122 (H) 11/04/2019 09:23 AM    Protein, total 6.9 11/04/2019 09:23 AM    Albumin 3.8 11/04/2019 09:23 AM    Globulin 3.1 11/04/2019 09:23 AM    A-G Ratio 1.2 11/04/2019 09:23 AM    ALT (SGPT) 13 11/04/2019 09:23 AM           ASSESSMENT      1. ICD   A. Dual chamber   B. Medtronic   C. Upgraded from dual chamber pacemaker   2. CAD, native  3. CABG  4. Myocardial infarction  5. Dyslipidemia  6. Ischemic cardiomyopathy       PLAN     Plan for ICD generator change with Medtronic using conscious sedation. Continue all medications prior      ICD-10-CM ICD-9-CM    1. Automatic implantable cardiac defibrillator in situ  Z95.810 V45.02    2. Coronary artery disease involving native coronary artery of native heart without angina pectoris  I25.10 414.01    3. Pure hypercholesterolemia  E78.00 272.0    4. Ischemic cardiomyopathy  I25.5 414.8      No orders of the defined types were placed in this encounter. FOLLOW-UP       Thank you, Mian Llanos MD for allowing me to participate in the care of this extraordinarily pleasant male. Please do not hesitate to contact me for further questions/concerns.          Carlos Alberto Lloyd MD  Cardiac Electrophysiology / Cardiology    [address]  [address] [place] [address]  [phone] / [phone] Fax   [phone] / [phone] Fax

## 2021-04-09 ENCOUNTER — OFFICE VISIT (OUTPATIENT)
Dept: CARDIOLOGY CLINIC | Age: 81
End: 2021-04-09
Payer: MEDICARE

## 2021-04-09 VITALS
DIASTOLIC BLOOD PRESSURE: 68 MMHG | WEIGHT: 131.6 LBS | OXYGEN SATURATION: 98 % | BODY MASS INDEX: 23.32 KG/M2 | HEIGHT: 63 IN | RESPIRATION RATE: 18 BRPM | HEART RATE: 101 BPM | SYSTOLIC BLOOD PRESSURE: 106 MMHG

## 2021-04-09 DIAGNOSIS — Z95.810 AUTOMATIC IMPLANTABLE CARDIAC DEFIBRILLATOR IN SITU: Primary | ICD-10-CM

## 2021-04-09 DIAGNOSIS — E78.00 PURE HYPERCHOLESTEROLEMIA: ICD-10-CM

## 2021-04-09 DIAGNOSIS — I25.5 ISCHEMIC CARDIOMYOPATHY: ICD-10-CM

## 2021-04-09 DIAGNOSIS — Z01.812 PRE-PROCEDURE LAB EXAM: ICD-10-CM

## 2021-04-09 DIAGNOSIS — I25.10 CORONARY ARTERY DISEASE INVOLVING NATIVE CORONARY ARTERY OF NATIVE HEART WITHOUT ANGINA PECTORIS: ICD-10-CM

## 2021-04-09 PROCEDURE — G8510 SCR DEP NEG, NO PLAN REQD: HCPCS | Performed by: INTERNAL MEDICINE

## 2021-04-09 PROCEDURE — G8536 NO DOC ELDER MAL SCRN: HCPCS | Performed by: INTERNAL MEDICINE

## 2021-04-09 PROCEDURE — 99215 OFFICE O/P EST HI 40 MIN: CPT | Performed by: INTERNAL MEDICINE

## 2021-04-09 PROCEDURE — G8420 CALC BMI NORM PARAMETERS: HCPCS | Performed by: INTERNAL MEDICINE

## 2021-04-09 PROCEDURE — G8427 DOCREV CUR MEDS BY ELIG CLIN: HCPCS | Performed by: INTERNAL MEDICINE

## 2021-04-09 PROCEDURE — 93000 ELECTROCARDIOGRAM COMPLETE: CPT | Performed by: INTERNAL MEDICINE

## 2021-04-09 PROCEDURE — 1101F PT FALLS ASSESS-DOCD LE1/YR: CPT | Performed by: INTERNAL MEDICINE

## 2021-04-09 NOTE — PROGRESS NOTES
Room #: 2    Here to schedule ICD gen change. Visit Vitals  /68 (BP 1 Location: Left upper arm, BP Patient Position: Sitting, BP Cuff Size: Adult)   Pulse (!) 101   Resp 18   Ht 5' 3\" (1.6 m)   Wt 131 lb 9.6 oz (59.7 kg)   SpO2 98%   BMI 23.31 kg/m²         Chest pain:  NO  Shortness of breath:  YES  Edema: YES  Palpitations, skipped beats, rapid heartbeat:  NO  Dizziness:  NO    1. Have you been to the ER, urgent care clinic since your last visit? Hospitalized since your last visit? No    2. Have you seen or consulted any other health care providers outside of the [address] [place] [address] since your last visit? Include any pap smears or colon screening.  No      Refills:  NO

## 2021-04-09 NOTE — PATIENT INSTRUCTIONS
You are scheduled for the following procedure with Dr. Erica Lerma:  Dual chamber Christy Limb at [facility], [address] [facility], [place], [address] [place] [address]      PLEASE be aware that your procedure date/time is tentative and subject to change due to emergency cases. Procedure date/time:    May 7, 2021  Time: 9:00 am - please arrive by 8:00 am      ARRIVAL time:  (You will need  to be discharged home with.)     o [facility] procedures: please arrive to check in on 2nd floor one hour prior to your procedure the day of your procedure.    o [facility] procedures: arrive to check in on 1st floor near [facility] entrance two hours prior to your procedure. Pre-procedure Labs/Imaging:    o PRE-PROCEDURE LABS NEEDED: YES   o Forms for labwork may be given at appointment. If not, they will be mailed to you. Labs should be completed within 5-7 days of procedure. These can be done at any [facility] or [facility] lab (one is located in [address] [place] [address]. No appointment needed). NO A COVID swab may be needed 4 days prior to your procedure. o YOU MAY NEED PRE-PROCEDURE IMAGING, CHEST CTA OR CARDIAC MRI.       []  Chest CTA     []  Cardiac MRI    ([facility] scheduling to call you)  -  [phone]          Medication Instructions:     Do not stop the following blood thinner prior to procedure: Eliquis         If you take any of the following Diabetic medications, please use as follows:    []  Glucophage/Metformin  -  Hold morning dose on day of procedure.    []  Insulin  -  Hold morning dose on day of procedure.    []  Lantus  -  Reduce dose by ½ evening before procedure. Nothing to eat or drink after midnight before your procedure. You may take all other medications as directed the morning of your procedure with a sip of water unless otherwise indicated. Please contact the office [phone] and ask for a member of Dr. Erica Lerma' team for procedure questions.  There is a physician on call for the office after hours for immediate needs. FOLLOW UP:   Your appointments will be made for you post procedure based off of discharge instructions. You may have driving restrictions for a short time after your procedure (usually 2-3 days). Pacemaker/ICD patients will be unable to have an MRI until 6 weeks after implant, NO dental work for 8 weeks after your implant.

## 2021-04-16 ENCOUNTER — TELEPHONE (OUTPATIENT)
Dept: CARDIOLOGY CLINIC | Age: 81
End: 2021-04-16

## 2021-04-16 NOTE — TELEPHONE ENCOUNTER
Patients son in law [facility] is calling to speak with a nurse regarding the patients blood work. Please advise.     Phone: [phone]

## 2021-04-19 NOTE — TELEPHONE ENCOUNTER
Returned call to [facility] patient's son in law. Patient ID verified using two patient identifiers. Pino Enme is concerned that the lab orders patient was given on 21 will be  before patient  is due to have them done. Advised him that the lab orders have an expiration of 22. Leroy Chance understanding and will contact the office with any further questions or concerns.

## 2021-04-20 ENCOUNTER — PREP FOR PROCEDURE (OUTPATIENT)
Dept: CARDIOLOGY CLINIC | Age: 81
End: 2021-04-20

## 2021-04-20 RX ORDER — SODIUM CHLORIDE 0.9 % (FLUSH) 0.9 %
5-40 SYRINGE (ML) INJECTION EVERY 8 HOURS
Status: CANCELLED | OUTPATIENT
Start: 2021-04-20

## 2021-04-20 RX ORDER — SODIUM CHLORIDE 0.9 % (FLUSH) 0.9 %
5-40 SYRINGE (ML) INJECTION AS NEEDED
Status: CANCELLED | OUTPATIENT
Start: 2021-04-20

## 2021-04-29 ENCOUNTER — TELEPHONE (OUTPATIENT)
Dept: CARDIOLOGY CLINIC | Age: 81
End: 2021-04-29

## 2021-04-29 DIAGNOSIS — I25.10 CORONARY ARTERY DISEASE INVOLVING NATIVE CORONARY ARTERY OF NATIVE HEART WITHOUT ANGINA PECTORIS: ICD-10-CM

## 2021-04-29 DIAGNOSIS — I25.5 CARDIOMYOPATHY, ISCHEMIC: Primary | ICD-10-CM

## 2021-04-29 DIAGNOSIS — Z95.810 AUTOMATIC IMPLANTABLE CARDIAC DEFIBRILLATOR IN SITU: ICD-10-CM

## 2021-04-29 DIAGNOSIS — R60.0 LEG EDEMA, LEFT: ICD-10-CM

## 2021-04-29 NOTE — TELEPHONE ENCOUNTER
Pt to have ICD battery change 5/7/21. Pt is retaining fluid in legs - went to see PCP on Monday and was started on Lasix 20 mg - has not helped - wt is staying the same. Had lab done same day at PCP - Cr at that time was 1.01. Daughter to fax report to office.

## 2021-04-29 NOTE — TELEPHONE ENCOUNTER
Faxed INR/u/a to [facility] for Dr Nick Fisher for procedure  Faxed Pro-BNP to [facility] for Dr Olivia Mcfarlane to [place] [facility] to be done 4/30  Faxed BMP to [facility] to done Tuesday 5/4  appt made to see [facility] 5/5/21

## 2021-04-29 NOTE — TELEPHONE ENCOUNTER
Please begin him on Bumex at 1 mg twice a day for 3 days then back to 1 mg a day and ensure that he is getting potassium at least 20 mEq of K. Dur a day. He may need to be seen in the office in another week and also to have a proBNP checked.   I would also check a BMP in about a week

## 2021-04-29 NOTE — TELEPHONE ENCOUNTER
Patients daughter is calling to speak with a nurse as the patient is retaining fluid. She states that the patient is retaining fluid in his legs. His legs are shiny and swollen.      Phone: [phone]

## 2021-04-30 ENCOUNTER — TELEPHONE (OUTPATIENT)
Dept: CARDIOLOGY CLINIC | Age: 81
End: 2021-04-30

## 2021-04-30 RX ORDER — POTASSIUM CHLORIDE 20 MEQ/1
TABLET, EXTENDED RELEASE ORAL DAILY
COMMUNITY
End: 2021-04-30 | Stop reason: SDUPTHER

## 2021-04-30 RX ORDER — BUMETANIDE 1 MG/1
TABLET ORAL
COMMUNITY
End: 2021-04-30 | Stop reason: SDUPTHER

## 2021-04-30 RX ORDER — POTASSIUM CHLORIDE 20 MEQ/1
20 TABLET, EXTENDED RELEASE ORAL DAILY
Qty: 30 TAB | Refills: 5 | Status: SHIPPED | OUTPATIENT
Start: 2021-04-30 | End: 2021-06-02 | Stop reason: ALTCHOICE

## 2021-04-30 RX ORDER — BUMETANIDE 1 MG/1
TABLET ORAL
Qty: 33 TAB | Refills: 5 | Status: SHIPPED | OUTPATIENT
Start: 2021-04-30 | End: 2021-05-12 | Stop reason: DRUGHIGH

## 2021-04-30 NOTE — TELEPHONE ENCOUNTER
Patients daughter is calling to follow up on the patients dieretic that was to be called in yesterday. Pharmacy confirmed.       Phone: [phone]

## 2021-05-01 LAB
APPEARANCE UR: CLEAR
BILIRUB UR QL STRIP: NEGATIVE
BUN SERPL-MCNC: 14 MG/DL (ref 8–27)
BUN/CREAT SERPL: 13 (ref 10–24)
CALCIUM SERPL-MCNC: 9.4 MG/DL (ref 8.6–10.2)
CHLORIDE SERPL-SCNC: 104 MMOL/L (ref 96–106)
CO2 SERPL-SCNC: 23 MMOL/L (ref 20–29)
COLOR UR: YELLOW
CREAT SERPL-MCNC: 1.04 MG/DL (ref 0.76–1.27)
ERYTHROCYTE [DISTWIDTH] IN BLOOD BY AUTOMATED COUNT: 14.2 % (ref 11.6–15.4)
GLUCOSE SERPL-MCNC: 96 MG/DL (ref 65–99)
GLUCOSE UR QL: NEGATIVE
HCT VFR BLD AUTO: 44.4 % (ref 37.5–51)
HGB BLD-MCNC: 15.1 G/DL (ref 13–17.7)
HGB UR QL STRIP: NEGATIVE
INR PPP: 1.5 (ref 0.9–1.2)
KETONES UR QL STRIP: NEGATIVE
LEUKOCYTE ESTERASE UR QL STRIP: NEGATIVE
MCH RBC QN AUTO: 34.6 PG (ref 26.6–33)
MCHC RBC AUTO-ENTMCNC: 34 G/DL (ref 31.5–35.7)
MCV RBC AUTO: 102 FL (ref 79–97)
MICRO URNS: NORMAL
MORPHOLOGY BLD-IMP: ABNORMAL
NITRITE UR QL STRIP: NEGATIVE
NT-PROBNP SERPL-MCNC: 3847 PG/ML (ref 0–486)
PH UR STRIP: 5.5 [PH] (ref 5–7.5)
PLATELET # BLD AUTO: 59 X10E3/UL (ref 150–450)
POTASSIUM SERPL-SCNC: 4 MMOL/L (ref 3.5–5.2)
PROT UR QL STRIP: NORMAL
PROTHROMBIN TIME: 16 SEC (ref 9.1–12)
RBC # BLD AUTO: 4.37 X10E6/UL (ref 4.14–5.8)
SODIUM SERPL-SCNC: 143 MMOL/L (ref 134–144)
SP GR UR: 1.01 (ref 1–1.03)
UROBILINOGEN UR STRIP-MCNC: 0.2 MG/DL (ref 0.2–1)
WBC # BLD AUTO: 3.7 X10E3/UL (ref 3.4–10.8)

## 2021-05-03 NOTE — TELEPHONE ENCOUNTER
Your pro BNP, which is an indicator of heart failure, is elevated. Hopefully you have increased your Bumex as stated in please call me to let me know how you are doing regarding the lower extremity swelling.

## 2021-05-04 NOTE — PROGRESS NOTES
ICD-10-CM ICD-9-CM   1. Chronic systolic congestive heart failure (HCC)  I50.22 428.22     428.0   2. Ischemic cardiomyopathy  I25.5 414.8   3. S/P ICD (internal cardiac defibrillator) procedure  Z95.810 V45.02       Roderick Person is a 78 y.o. male last seen by Dr. Kaden Gonzales 1/2021. Seen by Dr. Alessandro Garza on 4/9/21 and scheduled for ICD gen change. Phone encounter to RN on 4/29/21:  \"Pt to have ICD battery change 5/7/21. Pt is retaining fluid in legs - went to see PCP on Monday and was started on Lasix 20 mg - has not helped - wt is staying the same. Had lab done same day at PCP - Cr at that time was 1.01. Daughter to fax report to office. \"    Dr. Latisha Poon on 4/29/21:  \"Please begin him on Bumex at 1 mg twice a day for 3 days then back to 1 mg a day and ensure that he is getting potassium at least 20 mEq of K. Dur a day. He may need to be seen in the office in another week and also to have a proBNP checked. I would also check a BMP in about a week\"      Amber Pace     Mr. Ha Jung is here today with his son in law who is very involved in his care. Mr. Antelmo Merida and his wife live independently but their daughter and son in law live just down the road. They help with medications and doctors visits. They report that his edema has significantly improved following the addition of Bumex 1 mg BID. Initially they had started Lasix 20 mg but he did not seem to have much of a response to that. They note that he has residual right leg edema. Firm from calf down. No redness or pain. He continues to take Bumex 1 mg daily. He denies any s/sxs of volume depletion - dry mouth, positional dizziness, etc.      His weight has reduced from 136 at PCP office now down to 122-123. They report that a chest xray was performed to assess diminished breath sounds, but this is not available for my review today.       Mr. Antelmo Merida notes a chronic cough, that has been present prior to this fluid retention. He has a swallow study recently which showed mild aspiration of liquids. He denies any orthopnea or PND. No sputum production or wheezing. Mild SOTELO remains the same. He denies any exertional chest pain. No tachycardia or palpitations. ACTIVE PROBLEM LIST     Patient Active Problem List    Diagnosis Date Noted    TIA (transient ischemic attack) 10/28/2019    Slurred speech 10/28/2019    Vitamin D deficiency 05/05/2017    Atypical atrial flutter ([facility] 05/05/2017    S/P ICD (internal cardiac defibrillator) procedure 03/01/2016    Ischemic cardiomyopathy 11/11/2015    Medication intolerance 07/29/2015    CAD (coronary artery disease) 07/10/2015    Parkinson's disease ([facility] 07/10/2015    Hyperlipidemia 07/10/2015    STEMI (ST elevation myocardial infarction) ([facility] [facility] 07/10/2015    Heart block 07/10/2015    Abdominal aortic aneurysm ([facility] 07/10/2015           PAST MEDICAL HISTORY     Past Medical History:   Diagnosis Date    Ill-defined condition     MI    Parkinson's disease ([facility]     Vitamin D deficiency 5/5/2017           PAST SURGICAL HISTORY     Past Surgical History:   Procedure Laterality Date    CARDIAC SURG PROCEDURE UNLIST      HX ORTHOPAEDIC      L hip replacement, R shoulder replacement.  HX PACEMAKER      INS PPM/ICD LED DUAL ONLY  7/13/2015         INS PPM/ICD LED DUAL ONLY  3/1/2016             ALLERGIES     No Known Allergies       FAMILY HISTORY     No family history on file.  negative for cardiac disease       SOCIAL HISTORY     Social History     Socioeconomic History    Marital status:      Spouse name: Not on file    Number of children: Not on file    Years of education: Not on file    Highest education level: Not on file   Tobacco Use    Smoking status: Never Smoker    Smokeless tobacco: Never Used   Substance and Sexual Activity    Alcohol use: No    Drug use: No    Sexual activity: Not Currently       MEDICATIONS     Current Outpatient Medications   Medication Sig    potassium chloride (K-DUR, KLOR-CON) 20 mEq tablet Take 1 Tab by mouth daily.  bumetanide (BUMEX) 1 mg tablet 1 mg 2 times a day x 3 days then daily there after    memantine (NAMENDA) 10 mg tablet TAKE AS DIRECTED ONE HALF TABLET BY MOUTH DAILY FOR 1 WEEK THEN ONE HALF TABLET TWICE DAILY FOR 1 WEEK THEN ONE TABLET TWICE DAILY. DO NOT S    rOPINIRole (REQUIP) 1 mg tablet TAKE ONE HALF TO ONE TABLET BY MOUTH 1 TO 3 HOURS BEFORE BEDTIME ONCE DAILY    calcium polycarbophil (FIBERCON) 625 mg tablet Take 1 Tab by mouth daily.  bismuth subsalicylate (PEPTO-BISMOL) 262 mg/15 mL suspension Take 30 mL by mouth every four (4) hours as needed for Indigestion (diarrhea). May take 30 mL every 30 minutes as needed for up to 8 doses initially.  carvedilol (COREG) 6.25 mg tablet Take 6.25 mg by mouth two (2) times daily (with meals).  rosuvastatin (CRESTOR) 20 mg tablet Take 20 mg by mouth nightly.  apixaban (ELIQUIS) 5 mg tablet Take 1 Tab by mouth two (2) times a day.  nitroglycerin (NITROSTAT) 0.4 mg SL tablet 1 Tab by SubLINGual route every five (5) minutes as needed for Chest Pain.  aspirin delayed-release 81 mg tablet Take 1 Tab by mouth daily.  multivitamin (ONE A DAY) tablet Take 1 Tab by mouth daily.  coenzyme q10-vitamin e (COQ10 ) 100-100 mg-unit cap Take 1 Tab by mouth daily.  carbidopa-levodopa (SINEMET)  mg per tablet Take 2 Tabs by mouth three (3) times daily. No current facility-administered medications for this visit. I have reviewed the nurses notes, vitals, problem list, allergy list, medical history, family, social history and medications. REVIEW OF SYMPTOMS      General: Pt denies excessive weight gain or loss. Pt is able to conduct ADL's  HEENT: Denies blurred vision, headaches, hearing loss, epistaxis and difficulty swallowing. Respiratory: Denies  congestion, wheezing or stridor.   Positive for chronic cough. Positive for SOB/SOTELO; mild and unchanged. Cardiovascular: Denies precordial pain, palpitations, edema or PND. Gastrointestinal: Denies poor appetite, indigestion, abdominal pain or blood in stool  Genitourinary: Denies hematuria, dysuria, increased urinary frequency  Musculoskeletal: Denies joint pain or swelling from muscles or joints + hip/joint pain  Neurologic: Denies paresthesias, headache, or sensory motor disturbance +tremor  Psychiatric: Denies confusion, insomnia, depression  Integumentray: Denies rash, itching or ulcers. Hematologic: Denies easy bruising, bleeding     PHYSICAL EXAMINATION      Vitals:    05/05/21 1337   BP: 98/62   Pulse: (!) 104   Resp: 16   SpO2: 96%   Weight: 122 lb (55.3 kg)   Height: 5' 3\" (1.6 m)        General: Well developed, in no acute distress. HEENT: No jaundice, oral mucosa moist, no oral ulcers  Neck: Supple, no stiffness, no lymphadenopathy, supple  Heart:  Normal S1/S2 negative S3 or S4. Regular, no murmur, gallop or rub, no jugular venous distention  Respiratory: Clear bilaterally x 4, no wheezing or rales. Decreased breath sounds in right base. Abdomen:   Soft, non-tender, bowel sounds are active.   Extremities: normal cap refill, no cyanosis. +Right leg edema, firm, non-pitting   Musculoskeletal: No clubbing, no deformities  Neuro: A&Ox3, speech clear, gait stable, cooperative, no focal neurologic deficits + hand tremor   Skin: Skin color is normal. No rashes or lesions. Non diaphoretic, moist.  Vascular: 2+ pulses symmetric in all extremities     DIAGNOSTIC DATA     1. Echo  7/11/15 - EF 35%, basilar septum AK, inferior wall and inferobasilar wall.  RV mild reduced function, mild AI, TR   10/13/15 - EF 15-20%, mild MR, moderate TR, mild to moderate NM  6/15/18-EF 30%, RVE, RACHID, MR mild, TR mod/severe, PA pressure 33 mmHg, AV trileaflet show increase thickness and sclerosis  10/30/19- EF 99-16%, mod systolic dysfunction, pacer/ICD present in right ventricle, borderline low systolic function in right ventricle, mild AV leaflet calcification present with reduced excursion, mild AV stenosis, mild/moderate AR, MV thickening, mild MR, moderate TR, mild LAE, moderate KARRIE     2. Cardiac catheterization  7/10/15 - severe native CAD w/ all proxs occluded, LAD after severly diseased first diagonal, patent moderately diseased SVG to OM 1, 2, SVG to RCA - Degenerated occluded - large clot burden in graft and diffuse distal graft and native RCA disease. Patent LIMA to LAD, LAD 60% distal to graft insertion, faint L to R collaterals. Given diffuse disease in culprit 23year old graft, PCI was not done. 3. Cholesterol   7/13/15 - TC 98, HDL 36, LDL 42.2, TG 99  4/30/18- , HDL 34, , TG 81  10/29/19- TC 99, HDL 31, LDL 54.6, TG 67  11/11/19- , HDL 33, LDL 77, TG 67    4. Pacemaker placed 7/13/15  The pacemaker is a Medtronic Adapta DR, model #ADDR [id number], serial [id number], atrial lead medtronic [id number], serial [id number], ventricular lead Medtronic model [id number] and serial [id number]    5. PERMANENT AICD INSERTION   DATE OF PROCEDURE: 3/1/2016 - Upgrade of the dual chamber pacemaker to dual chamber per Dr. Young Peters    6. Carotid Duplex  10/30/19- 0-49% stenosis bilaterally    7. Nuclear stress test  4/1/19- LV function is mild-to-moderately depressed with LVEF 42%. Abnormal septal wall motion and inferior wall hypokinesis. Large sized, high grade, fixed defect in the inferior wall without ischemia.  SSS 24, SRS 22, SDS 1.    8. LE Arterial PVR  6/19/19-Right: No evidence of significant arterial occlusive disease       LABORATORY DATA     Lab Results   Component Value Date/Time    WBC 3.7 04/30/2021 11:00 AM    Hemoglobin (POC) 16.3 07/10/2015 08:24 PM    HGB 15.1 04/30/2021 11:00 AM    Hematocrit (POC) 48 07/10/2015 08:24 PM    HCT 44.4 04/30/2021 11:00 AM    PLATELET 59 (LL) 03/27/3996 11:00 AM     (H) 04/30/2021 11:00 AM      Lab Results Component Value Date/Time    Sodium 143 04/30/2021 11:00 AM    Potassium 4.0 04/30/2021 11:00 AM    Chloride 104 04/30/2021 11:00 AM    CO2 23 04/30/2021 11:00 AM    Anion gap 4 (L) 11/04/2019 09:23 AM    Glucose 96 04/30/2021 11:00 AM    BUN 14 04/30/2021 11:00 AM    Creatinine 1.04 04/30/2021 11:00 AM    BUN/Creatinine ratio 13 04/30/2021 11:00 AM    GFR est AA 76 04/30/2021 11:00 AM    GFR est non-AA 66 04/30/2021 11:00 AM    Calcium 9.4 04/30/2021 11:00 AM    Bilirubin, total 0.8 11/04/2019 09:23 AM    Alk. phosphatase 122 (H) 11/04/2019 09:23 AM    Protein, total 6.9 11/04/2019 09:23 AM    Albumin 3.8 11/04/2019 09:23 AM    Globulin 3.1 11/04/2019 09:23 AM    A-G Ratio 1.2 11/04/2019 09:23 AM    ALT (SGPT) 13 11/04/2019 09:23 AM      Lab Results   Component Value Date/Time    PROBNP 3,847 (H) 04/30/2021 11:01 AM        ASSESSMENT/RECOMMENDATIONS:.      Hx of ICD implant now with battery ZORAIDA - scheduled for gen change this week. He seems well compensated at this time and able to proceed as scheduled. Edema and wt gain with hx of HFrEF - now on daily loop diuretics; currently Bumex 1 mg daily, with interval improvement. Some residual right leg edema, but considerable weight loss. Stable Class 2 dyspnea. Echo 2019 with LVEF 35-40%. In NAD. No s/sxs of volume depletion but note borderline low BP. Repeat BMP with stable renal function and potassium. ProBNP is elevated. - Plan to continue with current diuretic regimen through Generator change on Friday. Will regroup the Monday after via phone to determine wt, BP and edema, to determine how to move forward. - Advised knee high compression stockings.   - Advised daily weight and VS monitoring.   - Will repeat BMP again in 2 weeks to ensure stable renal function and electrolytes. - Repeat Echo in 1 month, the day of return office visit.       Cough; chronic - has been present longer than current exacerbation and although xray is reported as abnormal, this seems multifactorial in nature and not primarily 2/2 pulm edema. Normal oxygen saturation. In NAD. ? GERD/acid reflux, allergies, etc.    - Request recent xray for our records. - Continue Bumex, as noted above, nonetheless. - Encourage deep inspiration to prevent atelectasis, especially with hx of aspiration, to reduce risk of PNA. CAD - no exertional chest pain at a limited functional capacity. Nuclear stress test 4/2019 with fixed defect. - Continue ASA and statin    Atrial flutter - no s/sxs of recurrence or RVR.    - Continue Coreg for rate control  - Continue Eliquis for stroke prevention. Dyslipidemia - followed by Dr. Jailyn Molina. - Lipids are at goal on current medical regimen. Hx of TIA - on ASA, statin and Eliquis . They were encouraged to call back prior to my call with any additional questions or concerns.       Giulia Butron NP      [address], [address] [place] [address]     Nakul [address]      [phone] / [phone] Fax

## 2021-05-05 ENCOUNTER — OFFICE VISIT (OUTPATIENT)
Dept: CARDIOLOGY CLINIC | Age: 81
End: 2021-05-05
Payer: MEDICARE

## 2021-05-05 VITALS
DIASTOLIC BLOOD PRESSURE: 62 MMHG | BODY MASS INDEX: 21.62 KG/M2 | SYSTOLIC BLOOD PRESSURE: 98 MMHG | OXYGEN SATURATION: 96 % | RESPIRATION RATE: 16 BRPM | HEART RATE: 104 BPM | WEIGHT: 122 LBS | HEIGHT: 63 IN

## 2021-05-05 DIAGNOSIS — I25.5 ISCHEMIC CARDIOMYOPATHY: ICD-10-CM

## 2021-05-05 DIAGNOSIS — I50.22 CHRONIC SYSTOLIC CONGESTIVE HEART FAILURE (HCC): Primary | ICD-10-CM

## 2021-05-05 DIAGNOSIS — Z95.810 S/P ICD (INTERNAL CARDIAC DEFIBRILLATOR) PROCEDURE: ICD-10-CM

## 2021-05-05 PROCEDURE — G8536 NO DOC ELDER MAL SCRN: HCPCS | Performed by: NURSE PRACTITIONER

## 2021-05-05 PROCEDURE — 99214 OFFICE O/P EST MOD 30 MIN: CPT | Performed by: NURSE PRACTITIONER

## 2021-05-05 PROCEDURE — G8427 DOCREV CUR MEDS BY ELIG CLIN: HCPCS | Performed by: NURSE PRACTITIONER

## 2021-05-05 PROCEDURE — G8420 CALC BMI NORM PARAMETERS: HCPCS | Performed by: NURSE PRACTITIONER

## 2021-05-05 PROCEDURE — 1101F PT FALLS ASSESS-DOCD LE1/YR: CPT | Performed by: NURSE PRACTITIONER

## 2021-05-05 PROCEDURE — G8432 DEP SCR NOT DOC, RNG: HCPCS | Performed by: NURSE PRACTITIONER

## 2021-05-05 NOTE — PATIENT INSTRUCTIONS
Please continue with Bumex 1 mg daily. Please monitor closely for any signs of volume depletion - dry mouth, concentrated urine or dizziness with standing. Add knee high compression stockings in addition to ensuring you are elevating your legs when seated. Continue with Potassium 20 mEq. I am going to recommend that we recheck the labs in 2 weeks. I will call you with the results and we can then determine if we are going to continue with 1 mg daily, 0.5 mg daily or simply as needed doses. Weigh each day and if you see a weight gain of 3-5 pounds over a 2-3 day period, add an additional 1 mg dose of Bumex. Call me with any questions or concerns prior to my call in 2 weeks.

## 2021-05-05 NOTE — PROGRESS NOTES
Chief Complaint   Patient presents with    Follow-up     Started Bumex and is now on 1 tab daily    Ankle swelling    Leg Swelling     Visit Vitals  BP 98/62 (BP 1 Location: Right arm, BP Patient Position: Sitting, BP Cuff Size: Adult)   Pulse (!) 104   Resp 16   Ht 5' 3\" (1.6 m)   Wt 122 lb (55.3 kg)   SpO2 96%   BMI 21.61 kg/m²     Patient presents in office with c/o recent swelling BLE and fatigue. Recently started on Bumex, and weight has decreased and swelling diminished. Having device replacement with Dr. Krissy Louise 5/7/21.

## 2021-05-07 ENCOUNTER — HOSPITAL ENCOUNTER (OUTPATIENT)
Age: 81
Setting detail: OUTPATIENT SURGERY
Discharge: HOME | End: 2021-05-07
Attending: INTERNAL MEDICINE | Admitting: INTERNAL MEDICINE
Payer: MEDICARE

## 2021-05-07 ENCOUNTER — TELEPHONE (OUTPATIENT)
Dept: CARDIOLOGY CLINIC | Age: 81
End: 2021-05-07

## 2021-05-07 DIAGNOSIS — I25.5 CARDIOMYOPATHY, ISCHEMIC: ICD-10-CM

## 2021-05-07 LAB
ERYTHROCYTE [DISTWIDTH] IN BLOOD BY AUTOMATED COUNT: 15.9 % (ref 11.5–14.5)
HCT VFR BLD AUTO: 42.1 % (ref 36.6–50.3)
HGB BLD-MCNC: 13.8 G/DL (ref 12.1–17)
MCH RBC QN AUTO: 33.5 PG (ref 26–34)
MCHC RBC AUTO-ENTMCNC: 32.8 G/DL (ref 30–36.5)
MCV RBC AUTO: 102.2 FL (ref 80–99)
NRBC # BLD: 0 K/UL (ref 0–0.01)
NRBC BLD-RTO: 0 PER 100 WBC
PLATELET # BLD AUTO: 56 K/UL (ref 150–400)
PMV BLD AUTO: 10.7 FL (ref 8.9–12.9)
RBC # BLD AUTO: 4.12 M/UL (ref 4.1–5.7)
WBC # BLD AUTO: 3.7 K/UL (ref 4.1–11.1)

## 2021-05-07 PROCEDURE — 85027 COMPLETE CBC AUTOMATED: CPT

## 2021-05-07 PROCEDURE — 36415 COLL VENOUS BLD VENIPUNCTURE: CPT

## 2021-05-07 NOTE — ROUTINE PROCESS
8:32 AM    Patient arrived. ID and allergies verified verbally with patient. Pt voices understanding of procedure to be performed. Consent obtained. Pt prepped for procedure. 8:45 AM    Notified by patients son in law that patient accidentally took wife medications which included Seroquel, a medication that the patient does not normally take. Patient alert and oriented and answers questions appropriately. Patient does state that he is sleepy and it is hard to keep eyes open. 9:00 AM    Dr Ema Costa at bedside and notified of medications mishap. Procedure cancelled and rescheduled for Monday 5/10/21    9:22 AM    Pt discharged via wheelchair with family. Personal belongings with patient upon discharge.

## 2021-05-07 NOTE — TELEPHONE ENCOUNTER
Called patient and spoke with his daughter Bradford Barbour, ID verified using two patient identifiers. Called to reschedule patient's ICD generator change that was cancelled for today. Patient rescheduled for Monday 5/10/21 at 11:30 am.  Advised her that patient needs to arrive at 10:00am.  Patient's daughter verbalized understanding and will call with any other questions.

## 2021-05-10 ENCOUNTER — HOSPITAL ENCOUNTER (OUTPATIENT)
Age: 81
Setting detail: OUTPATIENT SURGERY
Discharge: HOME | End: 2021-05-10
Attending: INTERNAL MEDICINE | Admitting: INTERNAL MEDICINE
Payer: MEDICARE

## 2021-05-10 ENCOUNTER — TELEPHONE (OUTPATIENT)
Dept: CARDIOLOGY CLINIC | Age: 81
End: 2021-05-10

## 2021-05-10 VITALS
TEMPERATURE: 96.8 F | BODY MASS INDEX: 21.25 KG/M2 | HEART RATE: 89 BPM | SYSTOLIC BLOOD PRESSURE: 109 MMHG | WEIGHT: 119.9 LBS | RESPIRATION RATE: 28 BRPM | HEIGHT: 63 IN | DIASTOLIC BLOOD PRESSURE: 69 MMHG | OXYGEN SATURATION: 96 %

## 2021-05-10 DIAGNOSIS — I45.6 WPW SYNDROME: ICD-10-CM

## 2021-05-10 LAB
ERYTHROCYTE [DISTWIDTH] IN BLOOD BY AUTOMATED COUNT: 15.9 % (ref 11.5–14.5)
HCT VFR BLD AUTO: 42.5 % (ref 36.6–50.3)
HGB BLD-MCNC: 14 G/DL (ref 12.1–17)
MCH RBC QN AUTO: 33.9 PG (ref 26–34)
MCHC RBC AUTO-ENTMCNC: 32.9 G/DL (ref 30–36.5)
MCV RBC AUTO: 102.9 FL (ref 80–99)
NRBC # BLD: 0 K/UL (ref 0–0.01)
NRBC BLD-RTO: 0 PER 100 WBC
PLATELET # BLD AUTO: 67 K/UL (ref 150–400)
PMV BLD AUTO: 11.9 FL (ref 8.9–12.9)
RBC # BLD AUTO: 4.13 M/UL (ref 4.1–5.7)
WBC # BLD AUTO: 2.9 K/UL (ref 4.1–11.1)

## 2021-05-10 PROCEDURE — 33228 REMV&REPLC PM GEN DUAL LEAD: CPT | Performed by: INTERNAL MEDICINE

## 2021-05-10 PROCEDURE — C1721 AICD, DUAL CHAMBER: HCPCS | Performed by: INTERNAL MEDICINE

## 2021-05-10 PROCEDURE — 99152 MOD SED SAME PHYS/QHP 5/>YRS: CPT | Performed by: INTERNAL MEDICINE

## 2021-05-10 PROCEDURE — 77030018729 HC ELECTRD DEFIB PAD CARD -B: Performed by: INTERNAL MEDICINE

## 2021-05-10 PROCEDURE — 74011000250 HC RX REV CODE- 250: Performed by: INTERNAL MEDICINE

## 2021-05-10 PROCEDURE — 77030033138 HC SUT PGA STRATFX J&J -B: Performed by: INTERNAL MEDICINE

## 2021-05-10 PROCEDURE — 77030041279 HC DRSG PRMSL AG MDII -B: Performed by: INTERNAL MEDICINE

## 2021-05-10 PROCEDURE — 33263 RMVL & RPLCMT DFB GEN 2 LEAD: CPT | Performed by: INTERNAL MEDICINE

## 2021-05-10 PROCEDURE — 77030038613 HC SUT PDS STRATA SPIRL J&J -B: Performed by: INTERNAL MEDICINE

## 2021-05-10 PROCEDURE — 74011250636 HC RX REV CODE- 250/636: Performed by: INTERNAL MEDICINE

## 2021-05-10 PROCEDURE — 85027 COMPLETE CBC AUTOMATED: CPT

## 2021-05-10 PROCEDURE — 99153 MOD SED SAME PHYS/QHP EA: CPT | Performed by: INTERNAL MEDICINE

## 2021-05-10 PROCEDURE — 77030037713 HC CLOSR DEV INCIS ZIP STRY -B: Performed by: INTERNAL MEDICINE

## 2021-05-10 PROCEDURE — 77030028698 HC BLD TISS PLSM MEDT -D: Performed by: INTERNAL MEDICINE

## 2021-05-10 PROCEDURE — 36415 COLL VENOUS BLD VENIPUNCTURE: CPT

## 2021-05-10 DEVICE — DEFIBRILLATOR CARD 2 CHMBR CONDITIONAL PHYSIOCURVE DSGN: Type: IMPLANTABLE DEVICE | Status: FUNCTIONAL

## 2021-05-10 RX ORDER — GENTAMICIN SULFATE 80 MG/100ML
80 INJECTION, SOLUTION INTRAVENOUS ONCE
Status: COMPLETED | OUTPATIENT
Start: 2021-05-10 | End: 2021-05-10

## 2021-05-10 RX ORDER — CEFAZOLIN SODIUM 1 G/3ML
INJECTION, POWDER, FOR SOLUTION INTRAMUSCULAR; INTRAVENOUS AS NEEDED
Status: DISCONTINUED | OUTPATIENT
Start: 2021-05-10 | End: 2021-05-10 | Stop reason: HOSPADM

## 2021-05-10 RX ORDER — ONDANSETRON 2 MG/ML
INJECTION INTRAMUSCULAR; INTRAVENOUS AS NEEDED
Status: DISCONTINUED | OUTPATIENT
Start: 2021-05-10 | End: 2021-05-10 | Stop reason: HOSPADM

## 2021-05-10 RX ORDER — DIPHENHYDRAMINE HYDROCHLORIDE 50 MG/ML
INJECTION, SOLUTION INTRAMUSCULAR; INTRAVENOUS AS NEEDED
Status: DISCONTINUED | OUTPATIENT
Start: 2021-05-10 | End: 2021-05-10 | Stop reason: HOSPADM

## 2021-05-10 RX ORDER — MIDAZOLAM HYDROCHLORIDE 1 MG/ML
INJECTION, SOLUTION INTRAMUSCULAR; INTRAVENOUS AS NEEDED
Status: DISCONTINUED | OUTPATIENT
Start: 2021-05-10 | End: 2021-05-10 | Stop reason: HOSPADM

## 2021-05-10 RX ORDER — CEPHALEXIN 500 MG/1
500 CAPSULE ORAL 3 TIMES DAILY
Qty: 15 CAP | Refills: 0 | Status: SHIPPED | OUTPATIENT
Start: 2021-05-10 | End: 2021-05-15

## 2021-05-10 RX ORDER — FENTANYL CITRATE 50 UG/ML
INJECTION, SOLUTION INTRAMUSCULAR; INTRAVENOUS AS NEEDED
Status: DISCONTINUED | OUTPATIENT
Start: 2021-05-10 | End: 2021-05-10 | Stop reason: HOSPADM

## 2021-05-10 NOTE — Clinical Note
TRANSFER - OUT REPORT:     Verbal report given to: BEDSIDE. Report consisted of patient's Situation, Background, Assessment and   Recommendations(SBAR). Opportunity for questions and clarification was provided.

## 2021-05-10 NOTE — TELEPHONE ENCOUNTER
Patient's son in law states he was prescribed a diuretic and his weight has reduced a lot. States he would like to know if she should stop taking the medication or have the dosage reduced.      Phone: [phone]

## 2021-05-10 NOTE — PROGRESS NOTES
TRANSFER - IN REPORT:    Verbal report received from Justyna Prabhakar RN(name) on [facility]  being received from EP lab(unit) for routine progression of care      Report consisted of patients Situation, Background, Assessment and   Recommendations(SBAR). Information from the following report(s) Procedure Summary was reviewed with the receiving nurse. Opportunity for questions and clarification was provided. Assessment completed upon patients arrival to unit and care assumed. 1255: Patient received from EP lab. Patient with aquacel to left upper chest. Site clean, dry and intact. No hematoma. VS stable. Patient with no complaints at this time. Ice pack applied    1315: Discharge instructions and prescriptions reviewed with patient son in law. Opportunity provided for questions. [facility] verbalized understanding. 1415: Patient HOB elevated. Patient assisted with lunch tray. 1430: Discharge instructions and prescriptions reviewed with patient. Opportunity provided for questions. Patient verbalized understanding. Signed copy of discharge placed in the front of patient's chart. 1520: Patient dangled on the side of the bed. Patient with aquacel to left upper chest. Site clean, dry and intact. No hematoma. VS stable. Patient with no complaints at this time. 1525: Patient ambulated in the hallway. Gait steady. Patient with no complaints at this time. 1530: IV and tele removed. Assisted with dressing. 1540: Patient assisted to the bathroom via chair. Voided     1550: Patient escorted via wheelchair to entrance. Patient son in law driving. Patient discharged into care of [facility].

## 2021-05-10 NOTE — H&P
HISTORY OF PRESENTING ILLNESS      Tia Velasquez is a 78 y.o. male with ischemic cardiomyopathy, CAD/CABG, prior myocardial infarction, atrial flutter, dyslipidemia and dual chamber Medtronic ICD (upgraded from dual chamber pacemaker) at [facility].        PAST MEDICAL HISTORY           Past Medical History:   Diagnosis Date    Ill-defined condition       MI    Parkinson's disease ([facility] [address]      Vitamin D deficiency 5/5/2017             PAST SURGICAL HISTORY            Past Surgical History:   Procedure Laterality Date    CARDIAC SURG PROCEDURE UNLIST        HX ORTHOPAEDIC         L hip replacement, R shoulder replacement.  HX PACEMAKER        INS PPM/ICD LED DUAL ONLY   7/13/2015          INS PPM/ICD LED DUAL ONLY   3/1/2016                   ALLERGIES      No Known Allergies         FAMILY HISTORY      No family history on file. negative for cardiac disease         SOCIAL HISTORY      Social History               Socioeconomic History    Marital status:        Spouse name: Not on file    Number of children: Not on file    Years of education: Not on file    Highest education level: Not on file   Tobacco Use    Smoking status: Never Smoker    Smokeless tobacco: Never Used   Substance and Sexual Activity    Alcohol use: No    Drug use: No    Sexual activity: Not Currently               MEDICATIONS           Current Outpatient Medications   Medication Sig    memantine (NAMENDA) 10 mg tablet TAKE AS DIRECTED ONE HALF TABLET BY MOUTH DAILY FOR 1 WEEK THEN ONE HALF TABLET TWICE DAILY FOR 1 WEEK THEN ONE TABLET TWICE DAILY. DO NOT S    rOPINIRole (REQUIP) 1 mg tablet TAKE ONE HALF TO ONE TABLET BY MOUTH 1 TO 3 HOURS BEFORE BEDTIME ONCE DAILY    calcium polycarbophil (FIBERCON) 625 mg tablet Take 1 Tab by mouth daily.  bismuth subsalicylate (PEPTO-BISMOL) 262 mg/15 mL suspension Take 30 mL by mouth every four (4) hours as needed for Indigestion (diarrhea).  May take 30 mL every 30 minutes as needed for up to 8 doses initially.  carvedilol (COREG) 6.25 mg tablet Take 6.25 mg by mouth two (2) times daily (with meals).  rosuvastatin (CRESTOR) 20 mg tablet Take 20 mg by mouth nightly.  apixaban (ELIQUIS) 5 mg tablet Take 1 Tab by mouth two (2) times a day.  nitroglycerin (NITROSTAT) 0.4 mg SL tablet 1 Tab by SubLINGual route every five (5) minutes as needed for Chest Pain.  aspirin delayed-release 81 mg tablet Take 1 Tab by mouth daily.  multivitamin (ONE A DAY) tablet Take 1 Tab by mouth daily.  coenzyme q10-vitamin e (COQ10 ) 100-100 mg-unit cap Take 1 Tab by mouth daily.  carbidopa-levodopa (SINEMET)  mg per tablet Take 2 Tabs by mouth three (3) times daily.      No current facility-administered medications for this visit.          I have reviewed the nurses notes, vitals, problem list, allergy list, medical history, family, social history and medications.         REVIEW OF SYMPTOMS      General: Pt denies excessive weight gain or loss. Pt is able to conduct ADL's  HEENT: Denies blurred vision, headaches, hearing loss, epistaxis and difficulty swallowing. Respiratory: Denies cough, congestion, shortness of breath, SOTELO, wheezing or stridor. Cardiovascular: Denies precordial pain, palpitations, edema or PND  Gastrointestinal: Denies poor appetite, indigestion, abdominal pain or blood in stool  Genitourinary: Denies hematuria, dysuria, increased urinary frequency  Musculoskeletal: Denies joint pain or swelling from muscles or joints  Neurologic: Denies tremor, paresthesias, headache, or sensory motor disturbance  Psychiatric: Denies confusion, insomnia, depression  Integumentray: Denies rash, itching or ulcers. Hematologic: Denies easy bruising, bleeding         PHYSICAL EXAMINATION      Vitals: see vitals section  General: Well developed, in no acute distress.   HEENT: No jaundice, oral mucosa moist, no oral ulcers  Neck: Supple, no stiffness, no lymphadenopathy, supple  Heart:  Normal S1/S2 negative S3 or S4. Regular, no murmur, gallop or rub, no jugular venous distention  Respiratory: Clear bilaterally x 4, no wheezing or rales  Abdomen:   Soft, non-tender, bowel sounds are active.   Extremities:  No edema, normal cap refill, no cyanosis. Musculoskeletal: No clubbing, no deformities  Neuro: A&Ox3, speech clear, gait stable, cooperative, no focal neurologic deficits  Skin: Skin color is normal. No rashes or lesions. Non diaphoretic, moist.  Vascular: 2+ pulses symmetric in all extremities         DIAGNOSTIC DATA      EKG:          LABORATORY DATA            Lab Results   Component Value Date/Time     WBC 6.6 11/04/2019 09:23 AM     Hemoglobin (POC) 16.3 07/10/2015 08:24 PM     HGB 13.2 11/04/2019 09:23 AM     Hematocrit (POC) 48 07/10/2015 08:24 PM     HCT 40.5 11/04/2019 09:23 AM     PLATELET 83 (L) 48/88/7538 09:23 AM     MCV 96.0 11/04/2019 09:23 AM            Lab Results   Component Value Date/Time     Sodium 137 11/04/2019 09:23 AM     Potassium 4.2 11/04/2019 09:23 AM     Chloride 107 11/04/2019 09:23 AM     CO2 26 11/04/2019 09:23 AM     Anion gap 4 (L) 11/04/2019 09:23 AM     Glucose 84 11/04/2019 09:23 AM     BUN 29 (H) 11/04/2019 09:23 AM     Creatinine 1.06 11/04/2019 09:23 AM     BUN/Creatinine ratio 27 (H) 11/04/2019 09:23 AM     GFR est AA >60 11/04/2019 09:23 AM     GFR est non-AA >60 11/04/2019 09:23 AM     Calcium 9.0 11/04/2019 09:23 AM     Bilirubin, total 0.8 11/04/2019 09:23 AM     Alk. phosphatase 122 (H) 11/04/2019 09:23 AM     Protein, total 6.9 11/04/2019 09:23 AM     Albumin 3.8 11/04/2019 09:23 AM     Globulin 3.1 11/04/2019 09:23 AM     A-G Ratio 1.2 11/04/2019 09:23 AM     ALT (SGPT) 13 11/04/2019 09:23 AM             ASSESSMENT      1. ICD              A. Dual chamber              B. Medtronic              C. Upgraded from dual chamber pacemaker   2. CAD, native  3. CABG  4. Myocardial infarction  5. Dyslipidemia  6.  Ischemic cardiomyopathy         PLAN      Plan for ICD generator change with Medtronic using conscious sedation.   Continue all medications prior                aMry Lou Zimmerman MD

## 2021-05-10 NOTE — PROGRESS NOTES
TRANSFER - OUT REPORT:    Verbal report given to [facility] RN(name) on [facility]  being transferred to EP lab(unit) for ordered procedure       Report consisted of patients Situation, Background, Assessment and   Recommendations(SBAR). Information from the following report(s) SBAR was reviewed with the receiving nurse. Lines:   Peripheral IV 05/10/21 Left Forearm (Active)   Site Assessment Clean, dry, & intact 05/10/21 1104   Phlebitis Assessment 0 05/10/21 1104   Infiltration Assessment 0 05/10/21 1104   Dressing Status Clean, dry, & intact 05/10/21 1104   Dressing Type Transparent 05/10/21 1104   Hub Color/Line Status Blue;Flushed; Infusing 05/10/21 1104        Opportunity for questions and clarification was provided.       Patient transported with:   Registered Nurse

## 2021-05-10 NOTE — DISCHARGE INSTRUCTIONS
ICD  Discharge Instructions    Please make sure you have received your Temporary ICD identification card with your discharge instructions      MEDICATIONS         Take only the medications prescribed to you at discharge. ACTIVITY         Return to your normal activity, except as noted below. o It is, however, important to move the affected arm to prevent shoulder stiffness and locking. o Avoid tight clothes or unnecessary pressure over your incision (such as bra straps or seat belts). If it is tender or sensitive to clothing, cover the incision with a soft dressing or pad.  o Questions about driving are individualized and should be discussed with one of the EP Physicians prior to discharge. SHOWERING         Leave the bandage over your incision until your clinic follow up in 10-14 days after the Pacemaker implant. You bandage will be removed in clinic during that appointment.  It is important to keep the bandaged area clean and dry. You may shower around the site until the bandage is removed in clinic. Thereafter, you may shower after the bandage is removed, washing it gently with soap and water. Do not apply any lotions, powders, or perfumes to the incision line.  Avoid submerging your incision in water (tub baths, hot tubs, or swimming) for four weeks.  Underneath the dressing.  o You will most likely have a Zipline dressing over the incision. This is made with plastic zip ties to hold the incision together and promote better healing. You may note dried blood around this dressing which is normal. Do not attempt to pull this off.   o If you have white steri-strips over your incision (underneath the gauze dressing), they will curl up at the end and fall off, usually within 10 days. Do not pull them off.  - OR -   o You may have a different type of closure for the incision including a dermabond adhesive which will slowly peel and come off on its own once you are able to shower. DISCHARGE PRECAUTIONS         Record your temperature every day, at the same time, until follow up. A temperature of 100.5 F, or higher, can be the first sign of infection. This should be reported to your Doctor immediately.  You cannot have an MRI. You must be aware that any strong magnet or magnetic field can affect your ICD. In general, be careful of metal detectors, heavy machinery, and any area where arc-welding is performed. When approaching a security checkpoint show your Pacemaker ID Card to security personnel.  Always tell your doctor or dentist that you have an ICD. In some cases, antibiotics may be prescribed before certain procedures.  Your temporary identification will be given to you with these instructions. Keep your ICD card in your wallet or on your person at all times. You should receive your permanent card, although this may take up to 8-12 weeks. If you do not receive your permanent card, please call the office at [phone] or the phone number provided on your temporary card for the ICD company. TAKING YOUR PULSE         Take your pulse the same time every day, preferably in the morning.  Sit down and rest for 5 minutes prior to taking your pulse.  Take your pulse for 1 full minute, use a clock or stop watch with a second hand.  To feel your pulse, use the first two fingers of one hand; place them on the thumb side of the wrist of the opposite hand. The pulse will be steady, regular and throbbing.  Call the office if your pulse is less than 40 beats per minute. SYMPTOMS THAT NEED TO BE REPORTED IMMEDIATELY         Temperature more than 100.4 F     Redness or warmth at the incision site, or pain for longer than the first 5 days after the implant.  Drainage from the incision site.  Swelling around the incision site.  Shortness of breath.  Rapid heart rate or palpitations.      Dizziness, lightheadedness, fainting.  Slow pulse below 40 beats per minute.  REMEMBER: If you feel something is an emergency or cannot be handled over the phone, call 911 or go to the closest emergency room. WHAT TO DO IF YOUR ICD DELIVERS A SHOCK     · If your ICD delivers a shock, you should seek attention at the nearest emergency room, call our office or call 911.           Home Bliss MD  Cardiac Electrophysiology / Cardiology    [address]  [address] [facility], [address] [facility], [address]  [address]  [phone] / [phone] Fax       [phone] / [phone] Fax

## 2021-05-11 NOTE — TELEPHONE ENCOUNTER
Pt has been using Bumex 1 mg and son in law wanted to know if he should cut back on it further since his wt is stable. Does have a little edema in ankles but is wearing compression hose.

## 2021-05-12 RX ORDER — BUMETANIDE 1 MG/1
1 TABLET ORAL EVERY OTHER DAY
COMMUNITY
End: 2021-06-14 | Stop reason: SDUPTHER

## 2021-05-12 NOTE — TELEPHONE ENCOUNTER
Colletta Client, MD Parnell Hazard, LPN   Caller: Unspecified (2 days ago,  3:36 PM)             Yes change to every other day

## 2021-05-13 ENCOUNTER — PATIENT MESSAGE (OUTPATIENT)
Dept: CARDIOLOGY CLINIC | Age: 81
End: 2021-05-13

## 2021-05-22 LAB
BUN SERPL-MCNC: 22 MG/DL (ref 8–27)
BUN/CREAT SERPL: 24 (ref 10–24)
CALCIUM SERPL-MCNC: 9.2 MG/DL (ref 8.6–10.2)
CHLORIDE SERPL-SCNC: 105 MMOL/L (ref 96–106)
CO2 SERPL-SCNC: 25 MMOL/L (ref 20–29)
CREAT SERPL-MCNC: 0.9 MG/DL (ref 0.76–1.27)
GLUCOSE SERPL-MCNC: 70 MG/DL (ref 65–99)
NT-PROBNP SERPL-MCNC: 2051 PG/ML (ref 0–486)
POTASSIUM SERPL-SCNC: 3.9 MMOL/L (ref 3.5–5.2)
SODIUM SERPL-SCNC: 144 MMOL/L (ref 134–144)

## 2021-05-23 NOTE — PROGRESS NOTES
Stable renal function and electrolytes on current diuretic regimen. Interval decline in proBNP. Continue very other day dosing with low threshold to call with wt gain or worsening HF sxs. Will utilize additional PRN doses as needed.

## 2021-05-25 ENCOUNTER — OFFICE VISIT (OUTPATIENT)
Dept: CARDIOLOGY CLINIC | Age: 81
End: 2021-05-25
Payer: MEDICARE

## 2021-05-25 ENCOUNTER — CLINICAL SUPPORT (OUTPATIENT)
Dept: CARDIOLOGY CLINIC | Age: 81
End: 2021-05-25

## 2021-05-25 DIAGNOSIS — Z51.89 VISIT FOR WOUND CHECK: Primary | ICD-10-CM

## 2021-05-25 DIAGNOSIS — Z95.810 AUTOMATIC IMPLANTABLE CARDIAC DEFIBRILLATOR IN SITU: Primary | ICD-10-CM

## 2021-05-25 PROCEDURE — 99024 POSTOP FOLLOW-UP VISIT: CPT | Performed by: NURSE PRACTITIONER

## 2021-05-25 PROCEDURE — 93283 PRGRMG EVAL IMPLANTABLE DFB: CPT | Performed by: INTERNAL MEDICINE

## 2021-05-25 NOTE — PROGRESS NOTES
Patient presents for wound check post-device implantation. The dressing was removed and the site was inspected. The site appeared to be well-healing without ecchymosis/tenderness/erythema. Denies pain, fevers, discharge. Plan:  3 month follow up as scheduled. Future Appointments   Date Time Provider Morelia Lares   5/25/2021  2:30 PM WOUND CHECKS, [facility] BS AMB   8/16/2021 10:00 AM PACEMAKERSPARKLE [facility] AMB   8/16/2021 10:20 AM Cordelia Kent MD [facility] BS AMB   11/23/2021  8:45 AM REMOTE1, [facility] BS AMB         Continue follow up in device clinic as planned.

## 2021-06-02 RX ORDER — POTASSIUM CHLORIDE 40 MEQ/15ML
20 SOLUTION ORAL DAILY
Qty: 480 ML | Refills: 0 | Status: SHIPPED | OUTPATIENT
Start: 2021-06-02 | End: 2021-06-07

## 2021-06-02 NOTE — TELEPHONE ENCOUNTER
Patient's daughter calling to speak to the nurse as her dad is taking potassium 20 mgeq and the tablets are too big, she would like to know if he can be prescribed the powder in place of the tablets, please advise      [phone]

## 2021-06-07 RX ORDER — POTASSIUM CHLORIDE 20 MEQ/1
20 TABLET, EXTENDED RELEASE ORAL DAILY
COMMUNITY
End: 2021-06-07 | Stop reason: SDUPTHER

## 2021-06-07 RX ORDER — POTASSIUM CHLORIDE 40 MEQ/15ML
20 SOLUTION ORAL DAILY
Qty: 480 ML | Refills: 0 | Status: CANCELLED | OUTPATIENT
Start: 2021-06-07

## 2021-06-07 RX ORDER — POTASSIUM CHLORIDE 20 MEQ/1
20 TABLET, EXTENDED RELEASE ORAL DAILY
Qty: 90 TABLET | Refills: 3 | Status: SHIPPED | OUTPATIENT
Start: 2021-06-07

## 2021-06-07 NOTE — TELEPHONE ENCOUNTER
Patient's daughter Simona López, returned your call requesting the potassium tablets,  since the liquid and powder is extremely expensive.         [phone]

## 2021-06-14 RX ORDER — BUMETANIDE 1 MG/1
1 TABLET ORAL EVERY OTHER DAY
Qty: 15 TABLET | Refills: 3 | Status: SHIPPED | OUTPATIENT
Start: 2021-06-14 | End: 2021-10-20

## 2021-06-14 NOTE — TELEPHONE ENCOUNTER
Patient requesting a refill on bumetanide 1 mg, pharmacy verified as [facility] [phone], patients daughter states she has been cutting these pills in half due to patient losing too much weight

## 2021-06-19 NOTE — TELEPHONE ENCOUNTER
Refill is per verbal order of Dr. Zacarias Quijano.    Requested Prescriptions     Pending Prescriptions Disp Refills    apixaban (ELIQUIS) 5 mg tablet 60 Tab 2     Sig: Take 1 Tab by mouth two (2) times a day.
(2) more than 100 beats/min

## 2021-09-15 ENCOUNTER — OFFICE VISIT (OUTPATIENT)
Dept: CARDIOLOGY CLINIC | Age: 81
End: 2021-09-15

## 2021-09-15 ENCOUNTER — OFFICE VISIT (OUTPATIENT)
Dept: CARDIOLOGY CLINIC | Age: 81
End: 2021-09-15
Payer: MEDICARE

## 2021-09-15 VITALS
RESPIRATION RATE: 18 BRPM | OXYGEN SATURATION: 97 % | DIASTOLIC BLOOD PRESSURE: 64 MMHG | BODY MASS INDEX: 20.84 KG/M2 | HEIGHT: 63 IN | WEIGHT: 117.6 LBS | HEART RATE: 84 BPM | SYSTOLIC BLOOD PRESSURE: 118 MMHG

## 2021-09-15 DIAGNOSIS — Z95.810 AUTOMATIC IMPLANTABLE CARDIAC DEFIBRILLATOR IN SITU: Primary | ICD-10-CM

## 2021-09-15 PROCEDURE — 99215 OFFICE O/P EST HI 40 MIN: CPT | Performed by: INTERNAL MEDICINE

## 2021-09-15 PROCEDURE — G8420 CALC BMI NORM PARAMETERS: HCPCS | Performed by: INTERNAL MEDICINE

## 2021-09-15 PROCEDURE — G8536 NO DOC ELDER MAL SCRN: HCPCS | Performed by: INTERNAL MEDICINE

## 2021-09-15 PROCEDURE — G8427 DOCREV CUR MEDS BY ELIG CLIN: HCPCS | Performed by: INTERNAL MEDICINE

## 2021-09-15 PROCEDURE — G8510 SCR DEP NEG, NO PLAN REQD: HCPCS | Performed by: INTERNAL MEDICINE

## 2021-09-15 PROCEDURE — 93283 PRGRMG EVAL IMPLANTABLE DFB: CPT | Performed by: INTERNAL MEDICINE

## 2021-09-15 PROCEDURE — 1101F PT FALLS ASSESS-DOCD LE1/YR: CPT | Performed by: INTERNAL MEDICINE

## 2021-09-15 RX ORDER — APIXABAN 2.5 MG/1
2.5 TABLET, FILM COATED ORAL 2 TIMES DAILY
COMMUNITY
Start: 2021-08-15

## 2021-09-15 RX ORDER — WHEAT DEXTRIN 3 G/3.5 G
POWDER IN PACKET (EA) ORAL DAILY
COMMUNITY

## 2021-09-15 NOTE — PROGRESS NOTES
HISTORY OF PRESENTING ILLNESS      Akash Leonard is a 78 y.o. male with ischemic cardiomyopathy, CAD/CABG, prior myocardial infarction, atrial flutter, dyslipidemia and dual chamber Medtronic ICD (upgraded from dual chamber pacemaker) at [facility] for which he underwent ICD generator change now presents for follow-up. Interrogation reveals normal device functioning and incision is healed well. PAST MEDICAL HISTORY     Past Medical History:   Diagnosis Date    Ill-defined condition     MI    Parkinson's disease ([address]     Vitamin D deficiency 5/5/2017           PAST SURGICAL HISTORY     Past Surgical History:   Procedure Laterality Date    HX ORTHOPAEDIC      L hip replacement, R shoulder replacement.  HX PACEMAKER      INS PPM/ICD LED DUAL ONLY  7/13/2015         INS PPM/ICD LED DUAL ONLY  3/1/2016         DE CARDIAC SURG PROCEDURE UNLIST      DE REMVL PERM PM PLS GEN W/REPL PLSE GEN 2 LEAD SYS N/A 5/10/2021    ICD Gen Change/ MEDTRONIC performed by Thuy Perez MD at [address] CATH LAB          ALLERGIES     No Known Allergies       FAMILY HISTORY     No family history on file. negative for cardiac disease       SOCIAL HISTORY     Social History     Socioeconomic History    Marital status:      Spouse name: Not on file    Number of children: Not on file    Years of education: Not on file    Highest education level: Not on file   Tobacco Use    Smoking status: Never Smoker    Smokeless tobacco: Never Used   Substance and Sexual Activity    Alcohol use: No    Drug use: No    Sexual activity: Not Currently     Social Determinants of Health     Financial Resource Strain:     Difficulty of Paying Living Expenses:    Food Insecurity:     Worried About Running Out of Food in the Last Year:     Ran Out of Food in the Last Year:    Transportation Needs:     Lack of Transportation (Medical):      Lack of Transportation (Non-Medical):    Physical Activity:     Days of Exercise per Week:     Minutes of Exercise per Session:    Stress:     Feeling of Stress :    Social Connections:     Frequency of Communication with Friends and Family:     Frequency of Social Gatherings with Friends and Family:     Attends [employer] Services:     Active Member of Clubs or Organizations:     Attends Club or Organization Meetings:     Marital Status:          MEDICATIONS     Current Outpatient Medications   Medication Sig    bumetanide (BUMEX) 1 mg tablet Take 1 Tablet by mouth every other day.  potassium chloride (K-DUR, KLOR-CON) 20 mEq tablet Take 1 Tablet by mouth daily. dissolve in water to drink    memantine (NAMENDA) 10 mg tablet TAKE AS DIRECTED ONE HALF TABLET BY MOUTH DAILY FOR 1 WEEK THEN ONE HALF TABLET TWICE DAILY FOR 1 WEEK THEN ONE TABLET TWICE DAILY. DO NOT S    rOPINIRole (REQUIP) 1 mg tablet TAKE ONE HALF TO ONE TABLET BY MOUTH 1 TO 3 HOURS BEFORE BEDTIME ONCE DAILY    calcium polycarbophil (FIBERCON) 625 mg tablet Take 1 Tab by mouth daily.  bismuth subsalicylate (PEPTO-BISMOL) 262 mg/15 mL suspension Take 30 mL by mouth every four (4) hours as needed for Indigestion (diarrhea). May take 30 mL every 30 minutes as needed for up to 8 doses initially.  carvedilol (COREG) 6.25 mg tablet Take 6.25 mg by mouth two (2) times daily (with meals).  rosuvastatin (CRESTOR) 20 mg tablet Take 20 mg by mouth nightly.  apixaban (ELIQUIS) 5 mg tablet Take 1 Tab by mouth two (2) times a day.  nitroglycerin (NITROSTAT) 0.4 mg SL tablet 1 Tab by SubLINGual route every five (5) minutes as needed for Chest Pain.  aspirin delayed-release 81 mg tablet Take 1 Tab by mouth daily.  multivitamin (ONE A DAY) tablet Take 1 Tab by mouth daily.  coenzyme q10-vitamin e (COQ10 ) 100-100 mg-unit cap Take 1 Tab by mouth daily.  carbidopa-levodopa (SINEMET)  mg per tablet Take 2 Tabs by mouth three (3) times daily.      No current facility-administered medications for this visit. I have reviewed the nurses notes, vitals, problem list, allergy list, medical history, family, social history and medications. REVIEW OF SYMPTOMS      General: Pt denies excessive weight gain or loss. Pt is able to conduct ADL's  HEENT: Denies blurred vision, headaches, hearing loss, epistaxis and difficulty swallowing. Respiratory: Denies cough, congestion, shortness of breath, SOTELO, wheezing or stridor. Cardiovascular: Denies precordial pain, palpitations, edema or PND  Gastrointestinal: Denies poor appetite, indigestion, abdominal pain or blood in stool  Genitourinary: Denies hematuria, dysuria, increased urinary frequency  Musculoskeletal: Denies joint pain or swelling from muscles or joints  Neurologic: Denies tremor, paresthesias, headache, or sensory motor disturbance  Psychiatric: Denies confusion, insomnia, depression  Integumentray: Denies rash, itching or ulcers. Hematologic: Denies easy bruising, bleeding       PHYSICAL EXAMINATION      Vitals: see vitals section  General: Well developed, in no acute distress. HEENT: No jaundice, oral mucosa moist, no oral ulcers  Neck: Supple, no stiffness, no lymphadenopathy, supple  Heart:  Normal S1/S2 negative S3 or S4. Regular, no murmur, gallop or rub, no jugular venous distention  Respiratory: Clear bilaterally x 4, no wheezing or rales  Abdomen:   Soft, non-tender, bowel sounds are active. Extremities:  No edema, normal cap refill, no cyanosis. Musculoskeletal: No clubbing, no deformities  Neuro: A&Ox3, speech clear, gait stable, cooperative, no focal neurologic deficits  Skin: Skin color is normal. No rashes or lesions.  Non diaphoretic, moist.  Vascular: 2+ pulses symmetric in all extremities       DIAGNOSTIC DATA      EKG:        LABORATORY DATA      Lab Results   Component Value Date/Time    WBC 2.9 (L) 05/10/2021 11:10 AM    Hemoglobin (POC) 16.3 07/10/2015 08:24 PM    HGB 14.0 05/10/2021 11:10 AM    Hematocrit (POC) 48 07/10/2015 08:24 PM    HCT 42.5 05/10/2021 11:10 AM    PLATELET 67 (L) 10/75/6154 11:10 AM    .9 (H) 05/10/2021 11:10 AM      Lab Results   Component Value Date/Time    Sodium 144 05/21/2021 12:03 PM    Potassium 3.9 05/21/2021 12:03 PM    Chloride 105 05/21/2021 12:03 PM    CO2 25 05/21/2021 12:03 PM    Anion gap 4 (L) 11/04/2019 09:23 AM    Glucose 70 05/21/2021 12:03 PM    BUN 22 05/21/2021 12:03 PM    Creatinine 0.90 05/21/2021 12:03 PM    BUN/Creatinine ratio 24 05/21/2021 12:03 PM    GFR est AA 91 05/21/2021 12:03 PM    GFR est non-AA 79 05/21/2021 12:03 PM    Calcium 9.2 05/21/2021 12:03 PM    Bilirubin, total 0.8 11/04/2019 09:23 AM    Alk. phosphatase 122 (H) 11/04/2019 09:23 AM    Protein, total 6.9 11/04/2019 09:23 AM    Albumin 3.8 11/04/2019 09:23 AM    Globulin 3.1 11/04/2019 09:23 AM    A-G Ratio 1.2 11/04/2019 09:23 AM    ALT (SGPT) 13 11/04/2019 09:23 AM           ASSESSMENT      1. ICD   A. Dual chamber   B. Medtronic   C. Upgraded from dual chamber pacemaker   2. CAD, native  3. CABG  4. Myocardial infarction  5. Dyslipidemia  6. Ischemic cardiomyopathy       PLAN     Continue monitoring in device clinic       FOLLOW-UP     1 year      Thank you, Mary Barros MD for allowing me to participate in the care of this extraordinarily pleasant male. Please do not hesitate to contact me for further questions/concerns.          Geronimo Muñoz MD  Cardiac Electrophysiology / Cardiology    [address]  [address] [place] [address], [facility], [address],  [place]  [phone] / [phone] Fax   [phone] / [phone] Fax

## 2021-09-15 NOTE — PROGRESS NOTES
Room #: 3      Visit Vitals  /64 (BP 1 Location: Left upper arm, BP Patient Position: Sitting, BP Cuff Size: Adult)   Pulse 84   Resp 18   Ht 5' 3\" (1.6 m)   Wt 117 lb 9.6 oz (53.3 kg)   SpO2 97%   BMI 20.83 kg/m²         Chest pain:  NO  Shortness of breath:  NO  Edema: NO  Palpitations, skipped beats, rapid heartbeat:  NO  Dizziness:  NO    1. Have you been to the ER, urgent care clinic since your last visit? Hospitalized since your last visit? No    2. Have you seen or consulted any other health care providers outside of the [address] [place] [address] since your last visit? Include any pap smears or colon screening.  No      Refills:  NO

## 2021-09-15 NOTE — LETTER
9/15/2021    Patient: Marlen Torres   YOB: 1940   Date of Visit: 9/15/2021     Crissy Pérez MD  [address] [place] [id number]  Via Fax: [phone]    Dear Crissy Pérez MD,      Thank you for referring Mr. Raleigh Jacobson to [facility] for evaluation. My notes for this consultation are attached. If you have questions, please do not hesitate to call me. I look forward to following your patient along with you.       Sincerely,    Mitul Alvarez MD

## 2021-10-15 ENCOUNTER — HOSPITAL ENCOUNTER (EMERGENCY)
Age: 81
Discharge: HOME | End: 2021-10-15
Attending: STUDENT IN AN ORGANIZED HEALTH CARE EDUCATION/TRAINING PROGRAM
Payer: MEDICARE

## 2021-10-15 VITALS
SYSTOLIC BLOOD PRESSURE: 135 MMHG | HEART RATE: 95 BPM | RESPIRATION RATE: 17 BRPM | BODY MASS INDEX: 20.38 KG/M2 | TEMPERATURE: 98.5 F | OXYGEN SATURATION: 98 % | HEIGHT: 63 IN | DIASTOLIC BLOOD PRESSURE: 96 MMHG | WEIGHT: 115 LBS

## 2021-10-15 DIAGNOSIS — K91.840 HEMORRHAGE OF TOOTH SOCKET: Primary | ICD-10-CM

## 2021-10-15 PROCEDURE — 74011000250 HC RX REV CODE- 250: Performed by: STUDENT IN AN ORGANIZED HEALTH CARE EDUCATION/TRAINING PROGRAM

## 2021-10-15 PROCEDURE — 99282 EMERGENCY DEPT VISIT SF MDM: CPT

## 2021-10-15 RX ADMIN — TRANEXAMIC ACID 10 ML: 100 INJECTION, SOLUTION INTRAVENOUS at 03:18

## 2021-10-15 NOTE — ED TRIAGE NOTES
Pt complains of continued bleeding since   Dental surgery this afternoon.  Unable to get in contact with dentist.

## 2021-10-15 NOTE — ED PROVIDER NOTES
Patient is an 75-year-old male present emergency department for gum bleeding. Patient had a dental extraction performed today after getting home was having continued bleeding from the extraction site. On chart review patient is on Eliquis 2.5 mg twice daily. Family members with the patient is concerned that he had \"lost a lot of blood\" prior to arrival.  Patient is in no apparent distress. Past Medical History:   Diagnosis Date    Ill-defined condition     MI    Parkinson's disease ([address]     Vitamin D deficiency 5/5/2017       Past Surgical History:   Procedure Laterality Date    HX ORTHOPAEDIC      L hip replacement, R shoulder replacement.  HX PACEMAKER      INS PPM/ICD LED DUAL ONLY  7/13/2015         INS PPM/ICD LED DUAL ONLY  3/1/2016         MT CARDIAC SURG PROCEDURE UNLIST      MT REMVL PERM PM PLS GEN W/REPL PLSE GEN 2 LEAD SYS N/A 5/10/2021    ICD Gen Change/ MEDTRONIC performed by Kristen Jimenez MD at [facility]         History reviewed. No pertinent family history. Social History     Socioeconomic History    Marital status:      Spouse name: Not on file    Number of children: Not on file    Years of education: Not on file    Highest education level: Not on file   Occupational History    Not on file   Tobacco Use    Smoking status: Never Smoker    Smokeless tobacco: Never Used   Substance and Sexual Activity    Alcohol use: No    Drug use: No    Sexual activity: Not Currently   Other Topics Concern    Not on file   Social History Narrative    Not on file     Social Determinants of Health     Financial Resource Strain:     Difficulty of Paying Living Expenses:    Food Insecurity:     Worried About Running Out of Food in the Last Year:     [address] in the Last Year:    Transportation Needs:     Lack of Transportation (Medical):      Lack of Transportation (Non-Medical):    Physical Activity:     Days of Exercise per Week:     Minutes of Exercise per Session:    Stress:     Feeling of Stress :    Social Connections:     Frequency of Communication with Friends and Family:     Frequency of Social Gatherings with Friends and Family:     Attends Caodaism Services:     Active Member of Clubs or Organizations:     Attends Club or Organization Meetings:     Marital Status:    Intimate Partner Violence:     Fear of Current or Ex-Partner:     Emotionally Abused:     Physically Abused:     Sexually Abused: ALLERGIES: Patient has no known allergies. Review of Systems   HENT: Positive for dental problem. All other systems reviewed and are negative. Vitals:    10/15/21 0258   BP: (!) 135/96   Pulse: 95   Resp: 17   Temp: 98.5 °F (36.9 °C)   SpO2: 98%            Physical Exam  Vitals and nursing note reviewed. Constitutional:       Appearance: Normal appearance. HENT:      Head: Normocephalic and atraumatic. Mouth/Throat:        Comments: Socket that is actively oozing. Eyes:      Pupils: Pupils are equal, round, and reactive to light. Cardiovascular:      Pulses: Normal pulses. Heart sounds: Normal heart sounds. Pulmonary:      Effort: Pulmonary effort is normal.      Breath sounds: Normal breath sounds. Abdominal:      General: Abdomen is flat. Palpations: Abdomen is soft. Musculoskeletal:      Cervical back: Normal range of motion and neck supple. Neurological:      General: No focal deficit present. Mental Status: He is alert and oriented to person, place, and time. Mental status is at baseline. Comments: Patient with a left arm tremor secondary to Parkinson's. Psychiatric:         Mood and Affect: Mood normal.         Behavior: Behavior normal.          MDM  Number of Diagnoses or Management Options  Diagnosis management comments: A/P: Bleeding from dental extraction.   78-year-old male presenting with extraction site from dental procedure actively using after suctioning still actively oozing we will applied TXA to 4 x 4's for hemostasis. Procedures        Bleeding is completely controlled patient to be discharged to home.

## 2021-10-20 ENCOUNTER — HOSPITAL ENCOUNTER (EMERGENCY)
Age: 81
Discharge: HOME | End: 2021-10-20
Attending: STUDENT IN AN ORGANIZED HEALTH CARE EDUCATION/TRAINING PROGRAM
Payer: MEDICARE

## 2021-10-20 VITALS
BODY MASS INDEX: 20.98 KG/M2 | HEIGHT: 63 IN | WEIGHT: 118.39 LBS | SYSTOLIC BLOOD PRESSURE: 151 MMHG | HEART RATE: 87 BPM | OXYGEN SATURATION: 97 % | DIASTOLIC BLOOD PRESSURE: 87 MMHG | TEMPERATURE: 97.4 F | RESPIRATION RATE: 14 BRPM

## 2021-10-20 DIAGNOSIS — R39.198 DIFFICULTY IN URINATION: ICD-10-CM

## 2021-10-20 DIAGNOSIS — K91.840 HEMORRHAGE OF TOOTH SOCKET: Primary | ICD-10-CM

## 2021-10-20 LAB
ANION GAP SERPL CALC-SCNC: 9 MMOL/L (ref 5–15)
APPEARANCE UR: CLEAR
BACTERIA URNS QL MICRO: NEGATIVE /HPF
BASOPHILS # BLD: 0.1 K/UL (ref 0–0.1)
BASOPHILS NFR BLD: 1 % (ref 0–1)
BILIRUB UR QL: NEGATIVE
BUN SERPL-MCNC: 30 MG/DL (ref 6–20)
BUN/CREAT SERPL: 22 (ref 12–20)
CALCIUM SERPL-MCNC: 9.7 MG/DL (ref 8.5–10.1)
CHLORIDE SERPL-SCNC: 106 MMOL/L (ref 97–108)
CO2 SERPL-SCNC: 28 MMOL/L (ref 21–32)
COLOR UR: ABNORMAL
CREAT SERPL-MCNC: 1.34 MG/DL (ref 0.7–1.3)
DIFFERENTIAL METHOD BLD: ABNORMAL
EOSINOPHIL # BLD: 0.3 K/UL (ref 0–0.4)
EOSINOPHIL NFR BLD: 4 % (ref 0–7)
EPITH CASTS URNS QL MICRO: ABNORMAL /LPF
ERYTHROCYTE [DISTWIDTH] IN BLOOD BY AUTOMATED COUNT: 13.8 % (ref 11.5–14.5)
GLUCOSE SERPL-MCNC: 113 MG/DL (ref 65–100)
GLUCOSE UR STRIP.AUTO-MCNC: NEGATIVE MG/DL
HCT VFR BLD AUTO: 41.2 % (ref 36.6–50.3)
HGB BLD-MCNC: 13.4 G/DL (ref 12.1–17)
HGB UR QL STRIP: ABNORMAL
IMM GRANULOCYTES # BLD AUTO: 0 K/UL (ref 0–0.04)
IMM GRANULOCYTES NFR BLD AUTO: 0 % (ref 0–0.5)
KETONES UR QL STRIP.AUTO: ABNORMAL MG/DL
LEUKOCYTE ESTERASE UR QL STRIP.AUTO: NEGATIVE
LYMPHOCYTES # BLD: 1.7 K/UL (ref 0.8–3.5)
LYMPHOCYTES NFR BLD: 22 % (ref 12–49)
MCH RBC QN AUTO: 33.2 PG (ref 26–34)
MCHC RBC AUTO-ENTMCNC: 32.5 G/DL (ref 30–36.5)
MCV RBC AUTO: 102 FL (ref 80–99)
MONOCYTES # BLD: 0.6 K/UL (ref 0–1)
MONOCYTES NFR BLD: 8 % (ref 5–13)
NEUTS SEG # BLD: 4.8 K/UL (ref 1.8–8)
NEUTS SEG NFR BLD: 65 % (ref 32–75)
NITRITE UR QL STRIP.AUTO: NEGATIVE
NRBC # BLD: 0 K/UL (ref 0–0.01)
NRBC BLD-RTO: 0 PER 100 WBC
PH UR STRIP: 6 [PH] (ref 5–8)
PLATELET # BLD AUTO: 84 K/UL (ref 150–400)
PMV BLD AUTO: 11.5 FL (ref 8.9–12.9)
POTASSIUM SERPL-SCNC: 4.6 MMOL/L (ref 3.5–5.1)
PROT UR STRIP-MCNC: 30 MG/DL
RBC # BLD AUTO: 4.04 M/UL (ref 4.1–5.7)
RBC #/AREA URNS HPF: ABNORMAL /HPF (ref 0–5)
RBC MORPH BLD: ABNORMAL
SODIUM SERPL-SCNC: 143 MMOL/L (ref 136–145)
SP GR UR REFRACTOMETRY: 1.02 (ref 1–1.03)
UR CULT HOLD, URHOLD: NORMAL
UROBILINOGEN UR QL STRIP.AUTO: 1 EU/DL (ref 0.2–1)
WBC # BLD AUTO: 7.5 K/UL (ref 4.1–11.1)
WBC URNS QL MICRO: ABNORMAL /HPF (ref 0–4)

## 2021-10-20 PROCEDURE — 80048 BASIC METABOLIC PNL TOTAL CA: CPT

## 2021-10-20 PROCEDURE — 74011000250 HC RX REV CODE- 250: Performed by: STUDENT IN AN ORGANIZED HEALTH CARE EDUCATION/TRAINING PROGRAM

## 2021-10-20 PROCEDURE — 81001 URINALYSIS AUTO W/SCOPE: CPT

## 2021-10-20 PROCEDURE — 99283 EMERGENCY DEPT VISIT LOW MDM: CPT

## 2021-10-20 PROCEDURE — 36415 COLL VENOUS BLD VENIPUNCTURE: CPT

## 2021-10-20 PROCEDURE — 74011250636 HC RX REV CODE- 250/636: Performed by: STUDENT IN AN ORGANIZED HEALTH CARE EDUCATION/TRAINING PROGRAM

## 2021-10-20 PROCEDURE — 85025 COMPLETE CBC W/AUTO DIFF WBC: CPT

## 2021-10-20 RX ADMIN — SODIUM CHLORIDE 500 ML: 9 INJECTION, SOLUTION INTRAVENOUS at 11:37

## 2021-10-20 RX ADMIN — TRANEXAMIC ACID 10 ML: 100 INJECTION, SOLUTION INTRAVENOUS at 11:42

## 2021-10-20 NOTE — ED TRIAGE NOTES
Patient had a broken tooth removed by his dentist on Thursday afternoon. By Friday at 0200 he came to the ED because the family could not get right upper rear tooth site to stop bleeding. ED doctor used TXA on the site and patient went home. Bleeding started again yesterday afternoon and son in law has been soaking gauze pads with afrin, and has used 1 1/4 bottles since last night. Last BM was 2 days ago and last void was yesterday. Went to the dentist and they tried to put pressure on the site, but it did not work. Son in law brought him here to get TXA again. No referral for further oral care yet.

## 2021-10-20 NOTE — ED NOTES
RN d/c'd pt home per MD orders. RN provided d/c instructions and pt verbalized understanding with no further questions. Pt AOx4 and resting comfortably on RA. VSS and PIV removed.  Pt ambulated out of the ED in stable condition with his family member and all personal belongings

## 2021-10-20 NOTE — ED PROVIDER NOTES
Patient is a 75-year-old male with history of Parkinson's disease and pacemaker on Eliquis who returns to the emergency center with a chief complaint of continued tooth socket bleeding. Was seen here 5 days ago for similar presentation. Bleeding was able to be stopped with TXA. Bleeding restarted last night at home, per son-in-law. They have been using Afrin soaked gauze without relief. Went back to the dentist today who held manual pressure and instructed the patient to come back to the ED for another TXA treatment. Son also reports that he thinks patient is dehydrated and has not been urinating. Patient has some urinary urgency but has not been able to go in at least 24 hours, per son-in-law. Also feels constipated, has been a few days since his last bowel movement, per report. No fever, vomiting, dysuria, hematuria. They have held his Eliquis starting yesterday. No other complaints today. Dental Pain            Past Medical History:   Diagnosis Date    Ill-defined condition     MI    Parkinson's disease ([facility] [address]     Vitamin D deficiency 5/5/2017       Past Surgical History:   Procedure Laterality Date    HX ORTHOPAEDIC      L hip replacement, R shoulder replacement.  HX PACEMAKER      INS PPM/ICD LED DUAL ONLY  7/13/2015         INS PPM/ICD LED DUAL ONLY  3/1/2016         MT CARDIAC SURG PROCEDURE UNLIST      MT REMVL PERM PM PLS GEN W/REPL PLSE GEN 2 LEAD SYS N/A 5/10/2021    ICD Gen Change/ MEDTRONIC performed by Bernardo Da Silva MD at [facility]         History reviewed. No pertinent family history. Social History     Socioeconomic History    Marital status:      Spouse name: Not on file    Number of children: Not on file    Years of education: Not on file    Highest education level: Not on file   Occupational History    Not on file   Tobacco Use    Smoking status: Never Smoker    Smokeless tobacco: Never Used   Substance and Sexual Activity    Alcohol use:  No  Drug use: No    Sexual activity: Not Currently   Other Topics Concern    Not on file   Social History Narrative    Not on file     Social Determinants of Health     Financial Resource Strain:     Difficulty of Paying Living Expenses:    Food Insecurity:     Worried About Running Out of Food in the Last Year:     [address] in the Last Year:    Transportation Needs:     Lack of Transportation (Medical):  Lack of Transportation (Non-Medical):    Physical Activity:     Days of Exercise per Week:     Minutes of Exercise per Session:    Stress:     Feeling of Stress :    Social Connections:     Frequency of Communication with Friends and Family:     Frequency of Social Gatherings with Friends and Family:     Attends [employer] witness Services:     Active Member of Clubs or Organizations:     Attends Club or Organization Meetings:     Marital Status:    Intimate Partner Violence:     Fear of Current or Ex-Partner:     Emotionally Abused:     Physically Abused:     Sexually Abused: ALLERGIES: Patient has no known allergies. Review of Systems   Constitutional: Positive for fatigue. Negative for fever. HENT: Positive for dental problem (see HPI). Respiratory: Negative for cough and shortness of breath. Cardiovascular: Negative for chest pain. Gastrointestinal: Positive for abdominal pain (suprapubic) and constipation. Negative for nausea and vomiting. Genitourinary: Positive for difficulty urinating. Negative for dysuria and hematuria. All other systems reviewed and are negative. Vitals:    10/20/21 1056   BP: (!) 148/95   Pulse: 87   Resp: 14   Temp: 97.4 °F (36.3 °C)   SpO2: 98%   Weight: 53.7 kg (118 lb 6.2 oz)   Height: 5' 3\" (1.6 m)            Physical Exam  Vitals and nursing note reviewed. Constitutional:       General: He is not in acute distress. Appearance: He is well-developed. HENT:      Head: Normocephalic and atraumatic.       Mouth/Throat: Comments: Recent extraction noted, no active bleeding at this time. Eyes:      Conjunctiva/sclera: Conjunctivae normal.   Cardiovascular:      Rate and Rhythm: Normal rate and regular rhythm. Pulmonary:      Effort: Pulmonary effort is normal. No respiratory distress. Abdominal:      General: There is distension (mild, lower). Palpations: Abdomen is soft. Tenderness: There is abdominal tenderness (mild, suprapubic). There is no guarding. Musculoskeletal:      Cervical back: Normal range of motion and neck supple. Right lower leg: No edema. Left lower leg: No edema. Skin:     General: Skin is warm and dry. Neurological:      Mental Status: He is alert and oriented to person, place, and time. Motor: No abnormal muscle tone. MDM       Procedures      Bleeding stopped after TXA administration. Patient was able to urinate twice while in the ED. UA not suspicious for UTI. I suspect Afrin is contributing to his urinary retentive symptoms. Labs reassuring as well. [address] [place] stabilized today.

## 2021-11-23 ENCOUNTER — OFFICE VISIT (OUTPATIENT)
Dept: CARDIOLOGY CLINIC | Age: 81
End: 2021-11-23
Payer: MEDICARE

## 2021-11-23 DIAGNOSIS — Z95.810 AUTOMATIC IMPLANTABLE CARDIAC DEFIBRILLATOR IN SITU: Primary | ICD-10-CM

## 2021-11-23 PROCEDURE — 93296 REM INTERROG EVL PM/IDS: CPT | Performed by: NURSE PRACTITIONER

## 2021-11-23 PROCEDURE — 93295 DEV INTERROG REMOTE 1/2/MLT: CPT | Performed by: NURSE PRACTITIONER

## 2021-11-30 ENCOUNTER — TELEPHONE (OUTPATIENT)
Dept: CARDIOLOGY CLINIC | Age: 81
End: 2021-11-30

## 2021-11-30 RX ORDER — BUMETANIDE 0.5 MG/1
TABLET ORAL
Qty: 30 TABLET | Refills: 0 | Status: SHIPPED | OUTPATIENT
Start: 2021-11-30 | End: 2022-01-19 | Stop reason: SDUPTHER

## 2021-11-30 RX ORDER — BUMETANIDE 0.5 MG/1
TABLET ORAL
COMMUNITY
End: 2021-11-30 | Stop reason: SDUPTHER

## 2021-11-30 NOTE — TELEPHONE ENCOUNTER
Pt was seen in ER 10/20/21 - ER concerned with unable to urinate/dehydration. They discontinued Bumex. You last saw pt in May 2021. Pt has been using Bumex 0.5 mg daily - did not stop. Do you want pt to use PRN and come in for evaluation or continue 0.5 mg daily.

## 2021-11-30 NOTE — TELEPHONE ENCOUNTER
Radhika Patterson, the patient's son-in-law returning a call to the nurse, please contact Shasta Hogan at [phone]

## 2021-11-30 NOTE — TELEPHONE ENCOUNTER
Patient daughter is calling because her father needs a refill on his medication Bumetanide 1 mg 1 tablet a day. Patient is taking 1/2 of the tablet and they need a 30 day supply. Pharmacy confirmed. Patient is completely out.     [phone]

## 2022-01-12 ENCOUNTER — TELEPHONE (OUTPATIENT)
Dept: CARDIOLOGY CLINIC | Age: 82
End: 2022-01-12

## 2022-01-12 NOTE — TELEPHONE ENCOUNTER
Patient taking taking eliquis 2.5 mg and it was written by his PCP, the daughter would like to know if the doctor can write a prescription for the generic, please advise      The daughter would also like to know if a lower dose can be sent to the pharmacy for bumex 0.5mg, daughter stated the pills has to be cut in have and it crumbles all the time    Tejas Howard  [phone]

## 2022-01-19 RX ORDER — BUMETANIDE 0.5 MG/1
TABLET ORAL
Qty: 30 TABLET | Refills: 0 | Status: SHIPPED | OUTPATIENT
Start: 2022-01-19

## 2022-01-19 NOTE — TELEPHONE ENCOUNTER
Requested Prescriptions     Signed Prescriptions Disp Refills    bumetanide (BUMEX) 0.5 mg tablet 30 Tablet 0     Sig: As needed for 5 pound weight gain over 2 days. Authorizing Provider: Jamee Medina     Ordering User: Tang Todd     Refill per verbal order YESI Cohn NP.    Last visit:9/15/21  Next visit:9/26/22

## 2022-03-18 PROBLEM — E55.9 VITAMIN D DEFICIENCY: Status: ACTIVE | Noted: 2017-05-05

## 2022-03-18 PROBLEM — G45.9 TIA (TRANSIENT ISCHEMIC ATTACK): Status: ACTIVE | Noted: 2019-10-28

## 2022-03-19 PROBLEM — I48.4 ATYPICAL ATRIAL FLUTTER (HCC): Status: ACTIVE | Noted: 2017-05-05

## 2022-03-20 PROBLEM — R47.81 SLURRED SPEECH: Status: ACTIVE | Noted: 2019-10-28

## 2022-06-09 ENCOUNTER — OFFICE VISIT (OUTPATIENT)
Dept: CARDIOLOGY CLINIC | Age: 82
End: 2022-06-09
Payer: MEDICARE

## 2022-06-09 DIAGNOSIS — Z95.810 AUTOMATIC IMPLANTABLE CARDIAC DEFIBRILLATOR IN SITU: Primary | ICD-10-CM

## 2022-06-09 PROCEDURE — 93295 DEV INTERROG REMOTE 1/2/MLT: CPT | Performed by: INTERNAL MEDICINE

## 2022-06-09 PROCEDURE — 93296 REM INTERROG EVL PM/IDS: CPT | Performed by: INTERNAL MEDICINE

## 2022-09-15 ENCOUNTER — OFFICE VISIT (OUTPATIENT)
Dept: CARDIOLOGY CLINIC | Age: 82
End: 2022-09-15
Payer: MEDICARE

## 2022-09-15 DIAGNOSIS — Z95.0 CARDIAC PACEMAKER IN SITU: Primary | ICD-10-CM

## 2022-09-15 PROCEDURE — 93296 REM INTERROG EVL PM/IDS: CPT | Performed by: INTERNAL MEDICINE

## 2022-09-15 PROCEDURE — 93294 REM INTERROG EVL PM/LDLS PM: CPT | Performed by: INTERNAL MEDICINE

## 2022-12-13 PROBLEM — Z79.01 ANTICOAGULATION ADEQUATE: Status: ACTIVE | Noted: 2022-12-13

## 2022-12-14 ENCOUNTER — OFFICE VISIT (OUTPATIENT)
Dept: CARDIOLOGY CLINIC | Age: 82
End: 2022-12-14
Payer: MEDICARE

## 2022-12-14 ENCOUNTER — OFFICE VISIT (OUTPATIENT)
Dept: CARDIOLOGY CLINIC | Age: 82
End: 2022-12-14

## 2022-12-14 VITALS
OXYGEN SATURATION: 98 % | SYSTOLIC BLOOD PRESSURE: 94 MMHG | BODY MASS INDEX: 21.16 KG/M2 | WEIGHT: 119.4 LBS | RESPIRATION RATE: 14 BRPM | HEART RATE: 80 BPM | HEIGHT: 63 IN | DIASTOLIC BLOOD PRESSURE: 52 MMHG

## 2022-12-14 DIAGNOSIS — Z95.0 CARDIAC PACEMAKER IN SITU: Primary | ICD-10-CM

## 2022-12-14 DIAGNOSIS — G45.9 TIA (TRANSIENT ISCHEMIC ATTACK): ICD-10-CM

## 2022-12-14 DIAGNOSIS — Z79.01 ANTICOAGULATION ADEQUATE: ICD-10-CM

## 2022-12-14 DIAGNOSIS — I25.5 ISCHEMIC CARDIOMYOPATHY: Primary | ICD-10-CM

## 2022-12-14 DIAGNOSIS — I25.10 CORONARY ARTERY DISEASE INVOLVING NATIVE CORONARY ARTERY OF NATIVE HEART WITHOUT ANGINA PECTORIS: ICD-10-CM

## 2022-12-14 DIAGNOSIS — I45.9 HEART BLOCK: ICD-10-CM

## 2022-12-14 DIAGNOSIS — Z95.810 AUTOMATIC IMPLANTABLE CARDIAC DEFIBRILLATOR IN SITU: ICD-10-CM

## 2022-12-14 DIAGNOSIS — Z95.810 S/P ICD (INTERNAL CARDIAC DEFIBRILLATOR) PROCEDURE: ICD-10-CM

## 2022-12-14 DIAGNOSIS — I48.4 ATYPICAL ATRIAL FLUTTER (HCC): ICD-10-CM

## 2022-12-14 RX ORDER — CARVEDILOL 6.25 MG/1
6.25 TABLET ORAL DAILY
COMMUNITY

## 2022-12-14 RX ORDER — MEMANTINE HYDROCHLORIDE 10 MG/1
10 TABLET ORAL 2 TIMES DAILY
COMMUNITY
Start: 2022-11-20

## 2022-12-14 NOTE — PROGRESS NOTES
Room EP 5    Chief Complaint   Patient presents with    Cardiomyopathy    AICD Check     MDT     Visit Vitals  BP (!) 94/52 (BP 1 Location: Left upper arm, BP Patient Position: Sitting, BP Cuff Size: Adult)   Pulse 80   Resp 14   Ht 5' 3\" (1.6 m)   Wt 119 lb 6.4 oz (54.2 kg)   SpO2 98%   BMI 21.15 kg/m²         Chest pain: no    Shortness of breath: no    Edema: no    Palpitations, Skipped beats, Rapid heartbeat: when lying down feels a \"thump\"    Dizziness: yes, when gets up to quickly    Fatigue:no    New diagnosis/Surgeries: no     1. Have you been to the ER, urgent care clinic since your last visit? Hospitalized since your last visit? No      2. Have you seen or consulted any other health care providers outside of the [address] [place] [address] since your last visit? Include any pap smears or colon screening.      Refills:no

## 2022-12-14 NOTE — PROGRESS NOTES
Cardiac Electrophysiology Office Follow-up    NAME: Sam Garcia   :  1940  MRM:  [id number]    Date:  2022         Assessment and Plan:     1. Ischemic cardiomyopathy  -     AMB POC EKG ROUTINE W/ 12 LEADS, INTER & REP  2. Heart block  -     AMB POC EKG ROUTINE W/ 12 LEADS, INTER & REP  3. Coronary artery disease involving native coronary artery of native heart without angina pectoris  -     AMB POC EKG ROUTINE W/ 12 LEADS, INTER & REP  4. Atypical atrial flutter (HCC)  -     AMB POC EKG ROUTINE W/ 12 LEADS, INTER & REP  5. S/P ICD (internal cardiac defibrillator) procedure  -     AMB POC EKG ROUTINE W/ 12 LEADS, INTER & REP  6. TIA (transient ischemic attack)  -     AMB POC EKG ROUTINE W/ 12 LEADS, INTER & REP  7. Anticoagulation adequate  -     AMB POC EKG ROUTINE W/ 12 LEADS, INTER & REP     Ischemic cardiomyopathy  Euvolemic on exam.  No signs of CHF.  - OptiVol within normal limits  - Continue carvedilol 6.25 mg daily  - Continue Bumex 0.5 mg  - Continue statin 20 mg daily    Atypical atrial flutter  On percent burden atrial flutter. Rate well controlled  - Continue carvedilol 6.25 mg for rate control  - Continue Eliquis 2.5 mg twice daily for thromboembolic prophylaxis  - Conservative management given asymptomatic without any signs of heart failure    ICD  Medtronic dual chamber ICD with normal function. Battery life 9.2 years. No new or significant lead issues requiring intervention. No significant arrhythmias noted requiring intervention. Thoracic impedance monitoring stable. iterative programing was performed to assess lead parameters without any permanent changes. No sustained VT or ICD therapies. Ventricular pacing 8.1%.   - Remote transmission every 3 months  - Follow-up with NP in a year  - Follow-up me in 18 months    History of TIA  Continue anticoagulation and high risk statin therapy                     ATTENTION:   This medical record was transcribed using an electronic medical records/speech recognition system. Although proofread, it may and can contain electronic, spelling and other errors. Corrections may be executed at a later time. Please feel free to contact us for any clarifications as needed. Subjective:     Bill Hunter is a 78 y.o. male with ischemic cardiomyopathy, CAD/CABG, prior myocardial infarction, atrial flutter, dyslipidemia and dual chamber Medtronic ICD (upgraded from dual chamber pacemaker) now presents for follow-up of device management. Interrogation reveals normal device functioning and incision is healed well. He has not had any hospitalization. No ICD therapies. No evidence of chest pain, shortness of breath or orthopnea. EKG demonstrated atrial flutter with ventricular rate of 76 bpm      Review of Systems:   12 point review of systems was performed. All negative except for HPI    Exam:     Physical Exam:  BP (!) 94/52 (BP 1 Location: Left upper arm, BP Patient Position: Sitting, BP Cuff Size: Adult)   Pulse 80   Resp 14   Ht 5' 3\" (1.6 m)   Wt 119 lb 6.4 oz (54.2 kg)   SpO2 98%   BMI 21.15 kg/m²     PHYSICAL EXAM  General:  Alert and oriented, in no acute distress  Head:  Atraumatic, normocephalic  Eyes:  extraocular muscles intact  Neck:  Supple, normal range of motion  Lungs:  Clear to auscultation bilaterally, no wheezes/rales/rhonchi   Cardiovascular: Irregularly irregular, variable S1-S2, no murmurs/rubs/gallops  Abdomen:  Soft, nontender, nondistended, normoactive bowel sounds  Skin:  Intact, no rash' left pectoral site clean dry intact with out any abnormalities. Extremities:, no clubbing, cyanosis, or edema  Musculoskeletal: normal range of motion  Neurological:  Alert and oriented, no focal neurologic deficits  Psychiatric:  Normal mood and affect      Medications:     Current Outpatient Medications   Medication Sig    memantine (NAMENDA) 10 mg tablet Take 10 mg by mouth two (2) times a day.     carvediloL (Coreg) 6.25 mg tablet Take 6.25 mg by mouth daily. bumetanide (BUMEX) 0.5 mg tablet As needed for 5 pound weight gain over 2 days. Eliquis 2.5 mg tablet Take 2.5 mg by mouth two (2) times a day. Wheat Dextrin (Benefiber Clear) 3 gram/3.5 gram pwpk Take  by mouth daily. rOPINIRole (REQUIP) 1 mg tablet TAKE ONE HALF TO ONE TABLET BY MOUTH 1 TO 3 HOURS BEFORE BEDTIME ONCE DAILY    rosuvastatin (CRESTOR) 20 mg tablet Take 20 mg by mouth nightly. nitroglycerin (NITROSTAT) 0.4 mg SL tablet 1 Tab by SubLINGual route every five (5) minutes as needed for Chest Pain. carbidopa-levodopa (SINEMET)  mg per tablet Take 2 Tabs by mouth three (3) times daily. potassium chloride (K-DUR, KLOR-CON) 20 mEq tablet Take 1 Tablet by mouth daily. dissolve in water to drink (Patient not taking: Reported on 12/14/2022)    aspirin delayed-release 81 mg tablet Take 1 Tab by mouth daily. (Patient not taking: Reported on 12/14/2022)     No current facility-administered medications for this visit. Diagnostic Data Review:       Results for orders placed or performed during the hospital encounter of 10/28/19   EKG, 12 LEAD, INITIAL   Result Value Ref Range    Ventricular Rate 64 BPM    Atrial Rate 64 BPM    P-R Interval 280 ms    QRS Duration 156 ms    Q-T Interval 496 ms    QTC Calculation (Bezet) 511 ms    Calculated P Axis 10 degrees    Calculated R Axis -61 degrees    Calculated T Axis 35 degrees    Diagnosis       AV dual-paced rhythm with prolonged AV conduction with frequent premature   ventricular complexes  Abnormal ECG  When compared with ECG of 25-NOV-2015 15:41,  premature ventricular complexes are now present  Vent. rate has decreased BY   9 BPM  Confirmed by Gary HONEYCUTT, [place] ([id number]) on 10/29/2019 9:37:44 AM        1. Echo   7/11/15 - EF 35%, basilar septum AK, inferior wall and inferobasilar wall.  RV mild reduced function, mild AI, TR   10/13/15 - EF 15-20%, mild MR, moderate TR, mild to moderate CA   6/15/18-EF 30%, RVE, RACHID, MR mild, TR mod/severe, PA pressure 33 mmHg, AV trileaflet show increase thickness and sclerosis   10/30/19- EF 34-29%, mod systolic dysfunction, pacer/ICD present in right ventricle, borderline low systolic function in right ventricle, mild AV leaflet calcification present with reduced excursion, mild AV stenosis, mild/moderate AR, MV thickening, mild MR, moderate TR, mild LAE, moderate KARRIE     2. Cardiac catheterization   7/10/15 - severe native CAD w/ all proxs occluded, LAD after severly diseased first diagonal, patent moderately diseased SVG to OM 1, 2, SVG to RCA - Degenerated occluded - large clot burden in graft and diffuse distal graft and native RCA disease. Patent LIMA to LAD, LAD 60% distal to graft insertion, faint L to R collaterals. Given diffuse disease in culprit 23year old graft, PCI was not done. 3. Cholesterol   7/13/15 - TC 98, HDL 36, LDL 42.2, TG 99   4/30/18- , HDL 34, , TG 81   10/29/19- TC 99, HDL 31, LDL 54.6, TG 67   11/11/19- , HDL 33, LDL 77, TG 67     4. Pacemaker placed 7/13/15   The pacemaker is a Medtronic Adapta , model #ADDR [id number], serial [id number], atrial lead medtronic [id number], serial [id number], ventricular lead Medtronic model [id number] and serial [id number]     5. PERMANENT AICD INSERTION   DATE OF PROCEDURE: 3/1/2016 - Upgrade of the dual chamber pacemaker to dual chamber per Dr. [facility]     6. Carotid Duplex   10/30/19- 0-49% stenosis bilaterally     7. Nuclear stress test   4/1/19- LV function is mild-to-moderately depressed with LVEF 42%. Abnormal septal wall motion and inferior wall hypokinesis. Large sized, high grade, fixed defect in the inferior wall without ischemia.  SSS 24, SRS 22, SDS 1.     8. LE Arterial PVR   6/19/19-Right: No evidence of significant arterial occlusive disease     9. 5/10/21: Medtronic dual chamber ICD generator change performed per Dr. Johnny Feliz      Lab Review:     Lab Results   Component Value Date/Time Cholesterol, total 99 10/29/2019 05:26 AM    HDL Cholesterol 31 10/29/2019 05:26 AM    LDL, calculated 54.6 10/29/2019 05:26 AM    Triglyceride 67 10/29/2019 05:26 AM    CHOL/HDL Ratio 3.2 10/29/2019 05:26 AM     Lab Results   Component Value Date/Time    Creatinine (POC) 1.3 07/10/2015 08:24 PM    Creatinine 1.34 (H) 10/20/2021 11:26 AM     Lab Results   Component Value Date/Time    BUN 30 (H) 10/20/2021 11:26 AM    BUN (POC) 24 (H) 07/10/2015 08:24 PM     Lab Results   Component Value Date/Time    Potassium 4.6 10/20/2021 11:26 AM     Lab Results   Component Value Date/Time    Hemoglobin A1c 5.9 10/29/2019 05:26 AM     Lab Results   Component Value Date/Time    Hemoglobin (POC) 16.3 07/10/2015 08:24 PM    HGB 13.4 10/20/2021 11:26 AM     Lab Results   Component Value Date/Time    PLATELET 84 (L) 94/82/2306 11:26 AM     No results for input(s): CPK, CKMB, TROIQ in the last 72 hours. No lab exists for component: CKQMB, CPKMB             ___________________________________________________    An Manuel Urias MD, [facility] [place], [address] [place] [address] [facility] [place]  [address] [place] [address]                             [phone]     [address] [place] [address].  [facility], [address] [facility]  [phone]

## 2023-03-14 ENCOUNTER — OFFICE VISIT (OUTPATIENT)
Dept: CARDIOLOGY CLINIC | Age: 83
End: 2023-03-14

## 2023-03-14 DIAGNOSIS — Z95.810 AUTOMATIC IMPLANTABLE CARDIAC DEFIBRILLATOR IN SITU: Primary | ICD-10-CM

## 2023-04-17 ENCOUNTER — OFFICE VISIT (OUTPATIENT)
Dept: CARDIOLOGY CLINIC | Age: 83
End: 2023-04-17

## 2023-04-17 DIAGNOSIS — Z95.810 AUTOMATIC IMPLANTABLE CARDIAC DEFIBRILLATOR IN SITU: Primary | ICD-10-CM

## 2023-04-20 NOTE — PROGRESS NOTES
C/ monthly HL reading below threshold, stable. Transmission uploaded to media.  Follow up as scheduled

## 2023-05-22 ENCOUNTER — NURSE ONLY (OUTPATIENT)
Age: 83
End: 2023-05-22

## 2023-05-22 DIAGNOSIS — Z95.810 PRESENCE OF AUTOMATIC (IMPLANTABLE) CARDIAC DEFIBRILLATOR: Primary | ICD-10-CM

## 2023-06-27 ENCOUNTER — NURSE ONLY (OUTPATIENT)
Age: 83
End: 2023-06-27

## 2023-06-27 DIAGNOSIS — Z95.810 PRESENCE OF AUTOMATIC (IMPLANTABLE) CARDIAC DEFIBRILLATOR: Primary | ICD-10-CM

## 2023-07-11 ENCOUNTER — TELEPHONE (OUTPATIENT)
Age: 83
End: 2023-07-11

## 2023-07-14 NOTE — TELEPHONE ENCOUNTER
Lipids  6/21/23- , HDL 42, LDL 58, TG 66    Alk Phos 181 - normal 14=988    He takes Crestor 20 mg daily - drawn at [facility] per you

## 2023-07-20 ENCOUNTER — TELEPHONE (OUTPATIENT)
Age: 83
End: 2023-07-20

## 2023-10-06 PROCEDURE — G2066 INTER DEVC REMOTE 30D: HCPCS | Performed by: INTERNAL MEDICINE

## 2023-10-06 PROCEDURE — 93297 REM INTERROG DEV EVAL ICPMS: CPT | Performed by: INTERNAL MEDICINE

## 2023-11-12 PROCEDURE — 93297 REM INTERROG DEV EVAL ICPMS: CPT | Performed by: INTERNAL MEDICINE

## 2023-11-12 PROCEDURE — G2066 INTER DEVC REMOTE 30D: HCPCS | Performed by: INTERNAL MEDICINE

## 2024-01-07 PROCEDURE — 93297 REM INTERROG DEV EVAL ICPMS: CPT | Performed by: INTERNAL MEDICINE

## 2024-01-10 ENCOUNTER — OFFICE VISIT (OUTPATIENT)
Age: 84
End: 2024-01-10

## 2024-01-10 VITALS
DIASTOLIC BLOOD PRESSURE: 70 MMHG | HEART RATE: 96 BPM | BODY MASS INDEX: 19.31 KG/M2 | HEIGHT: 63 IN | WEIGHT: 109 LBS | OXYGEN SATURATION: 97 % | SYSTOLIC BLOOD PRESSURE: 120 MMHG

## 2024-01-10 DIAGNOSIS — Z95.810 PRESENCE OF AUTOMATIC (IMPLANTABLE) CARDIAC DEFIBRILLATOR: Primary | ICD-10-CM

## 2024-01-10 DIAGNOSIS — I25.5 ISCHEMIC CARDIOMYOPATHY: ICD-10-CM

## 2024-01-10 DIAGNOSIS — G45.9 TRANSIENT CEREBRAL ISCHEMIA, UNSPECIFIED TYPE: ICD-10-CM

## 2024-01-10 DIAGNOSIS — I48.4 ATYPICAL ATRIAL FLUTTER (HCC): ICD-10-CM

## 2024-01-10 RX ORDER — ASPIRIN 81 MG/1
81 TABLET ORAL DAILY
Qty: 90 TABLET | Refills: 1
Start: 2024-01-10

## 2024-01-10 RX ORDER — ROSUVASTATIN CALCIUM 10 MG/1
10 TABLET, COATED ORAL NIGHTLY
Qty: 30 TABLET | Refills: 3
Start: 2024-01-10

## 2024-01-10 NOTE — PROGRESS NOTES
Chief Complaint   Patient presents with    Cardiomyopathy    Cholesterol Problem    Other     FLUTTER     Vitals:    01/10/24 1357   BP: 120/70   Site: Left Upper Arm   Position: Sitting   Cuff Size: Medium Adult   Pulse: 96   SpO2: 97%   Weight: 49.4 kg (109 lb)   Height: 1.6 m (5' 3\")      /70 (Site: Left Upper Arm, Position: Sitting, Cuff Size: Medium Adult)   Pulse 96   Ht 1.6 m (5' 3\")   Wt 49.4 kg (109 lb)   SpO2 97%   BMI 19.31 kg/m²        NITROGLYCERIN REFILL    .MAD

## 2024-01-10 NOTE — PROGRESS NOTES
CARDIOLOGY OFFICE NOTE    Ashish Ulloa MD, [facility]    [address], [place] [address]  Phone [phone]; Fax [phone]  Mobile [phone]   Voice Mail [phone]    Primary care: Daria Chavarria MD       ATTENTION:   This medical record was transcribed using an electronic medical records/speech recognition system.  Although proofread, it may and can contain electronic, spelling and other errors.  Corrections may be executed at a later time.  Please feel free to contact us for any clarifications as needed.             Ian Larson is a 83 y.o. male with  referred for CAD, cardiomyopathy and ICD.          Cardiac risk factors: advanced age (older than 55 for men, 65 for women), dyslipidemia, and male gender  I have personally obtained the history from the patient.    HISTORY OF PRESENTING ILLNESS    Ms./Mr. Ian Larson  83 y.o. is   a former .  Comes in with his daughter today.  He tells me that his wife passed away last month..  He has a history of a cardiomyopathy and an ICD and he had a scheduled appointment later on this month with the EP and I told him that.  Is been doing well no chest pain shortness of breath.  He is lost significant amount of weight and today we have adjusted some of his medicines.    His daughter states that he does have a fall risk and has been unstable/stopped his Eliquis and put on aspirin 81 mg a day.  They understand the risk of stroke and embolic events will be at this point believe the risk is greater than the benefits    Because of his weight loss I have adjusted his Crestor to half a tablet a day and repeat his labs in 3 months     ACTIVE PROBLEM LIST     Patient Active Problem List    Diagnosis Date Noted    Anticoagulation adequate 12/13/2022    TIA (transient ischemic attack) 10/28/2019    Slurred speech 10/28/2019    Vitamin D deficiency 05/05/2017    Atypical atrial flutter (HCC) 05/05/2017    S/P ICD

## 2024-01-15 ENCOUNTER — TELEPHONE (OUTPATIENT)
Age: 84
End: 2024-01-15

## 2024-01-29 NOTE — PROGRESS NOTES
Cardiac Electrophysiology Office Follow-up    NAME: Ian Larson   :  1940  MRM:  [id number]    Date:  2022       Assessment and Plan:     1. Ischemic cardiomyopathy  -     AMB POC EKG ROUTINE W/ 12 LEADS, INTER & REP  2. Heart block  -     AMB POC EKG ROUTINE W/ 12 LEADS, INTER & REP  3. Coronary artery disease involving native coronary artery of native heart without angina pectoris  -     AMB POC EKG ROUTINE W/ 12 LEADS, INTER & REP  4. Atypical atrial flutter (HCC)  -     AMB POC EKG ROUTINE W/ 12 LEADS, INTER & REP  5. S/P ICD (internal cardiac defibrillator) procedure  -     AMB POC EKG ROUTINE W/ 12 LEADS, INTER & REP  6. TIA (transient ischemic attack)  -     AMB POC EKG ROUTINE W/ 12 LEADS, INTER & REP  7. Anticoagulation adequate  -     AMB POC EKG ROUTINE W/ 12 LEADS, INTER & REP     ICD  Medtronic dual chamber ICD   6.4 years on battery   9.4% V-paced  100% Afl  No therapies   OptiVol WNL  - Continue Q3 month remote device transmissions. Follow-up in the EP clinic in one year, or sooner for any concerns.     Ischemic cardiomyopathy  Euvolemic on exam.  No signs of CHF.  - OptiVol within normal limits  - Change carvedilol from 6.25 mg daily to 3.125 mg BID due to low BP today     Atypical atrial flutter  100% burden. Rate well controlled  - Adjusting carvedilol as above  - Conservative management given asymptomatic without any signs of heart failure  - Eliquis stopped due to fall risk  - Continue aspirin 81 mg daily     CAD, s/p CABG  - Continue statin and aspirin     History of TIA  Continue aspirin and statin therapy                   Subjective:     Ian Larson is a 84 y.o. male with ischemic cardiomyopathy, CAD/CABG, prior myocardial infarction, atrial flutter, dyslipidemia and dual chamber Medtronic ICD (upgraded from dual chamber pacemaker) presents for follow-up of device management.     He feels well overall and denies chest pain, dyspnea or syncope. He does occasionally

## 2024-01-30 ENCOUNTER — PROCEDURE VISIT (OUTPATIENT)
Age: 84
End: 2024-01-30

## 2024-01-30 ENCOUNTER — OFFICE VISIT (OUTPATIENT)
Age: 84
End: 2024-01-30
Payer: COMMERCIAL

## 2024-01-30 VITALS
OXYGEN SATURATION: 98 % | SYSTOLIC BLOOD PRESSURE: 94 MMHG | BODY MASS INDEX: 19.95 KG/M2 | HEART RATE: 63 BPM | WEIGHT: 112.6 LBS | HEIGHT: 63 IN | DIASTOLIC BLOOD PRESSURE: 48 MMHG | RESPIRATION RATE: 16 BRPM

## 2024-01-30 DIAGNOSIS — I25.10 CORONARY ARTERY DISEASE INVOLVING NATIVE CORONARY ARTERY OF NATIVE HEART WITHOUT ANGINA PECTORIS: ICD-10-CM

## 2024-01-30 DIAGNOSIS — I48.4 ATYPICAL ATRIAL FLUTTER (HCC): ICD-10-CM

## 2024-01-30 DIAGNOSIS — Z95.810 PRESENCE OF AUTOMATIC (IMPLANTABLE) CARDIAC DEFIBRILLATOR: Primary | ICD-10-CM

## 2024-01-30 DIAGNOSIS — Z95.810 CARDIAC DEFIBRILLATOR IN SITU: ICD-10-CM

## 2024-01-30 DIAGNOSIS — I25.5 ISCHEMIC CARDIOMYOPATHY: ICD-10-CM

## 2024-01-30 DIAGNOSIS — Z95.810 ICD (IMPLANTABLE CARDIOVERTER-DEFIBRILLATOR) IN PLACE: Primary | ICD-10-CM

## 2024-01-30 PROCEDURE — 1123F ACP DISCUSS/DSCN MKR DOCD: CPT | Performed by: NURSE PRACTITIONER

## 2024-01-30 PROCEDURE — 99214 OFFICE O/P EST MOD 30 MIN: CPT | Performed by: NURSE PRACTITIONER

## 2024-01-30 RX ORDER — CARVEDILOL 3.12 MG/1
3.12 TABLET ORAL 2 TIMES DAILY
Qty: 180 TABLET | Refills: 3 | Status: SHIPPED | OUTPATIENT
Start: 2024-01-30 | End: 2024-01-30

## 2024-01-30 RX ORDER — CARVEDILOL 3.12 MG/1
3.12 TABLET ORAL 2 TIMES DAILY
Qty: 180 TABLET | Refills: 3 | Status: SHIPPED | OUTPATIENT
Start: 2024-01-30

## 2024-01-30 ASSESSMENT — PATIENT HEALTH QUESTIONNAIRE - PHQ9
SUM OF ALL RESPONSES TO PHQ QUESTIONS 1-9: 0
SUM OF ALL RESPONSES TO PHQ9 QUESTIONS 1 & 2: 0
1. LITTLE INTEREST OR PLEASURE IN DOING THINGS: 0
SUM OF ALL RESPONSES TO PHQ QUESTIONS 1-9: 0
2. FEELING DOWN, DEPRESSED OR HOPELESS: 0

## 2024-01-30 NOTE — PROGRESS NOTES
Room #: 3    Chief Complaint   Patient presents with    Annual Exam    Device Check       Vitals:    01/30/24 1026   BP: (!) 88/54   Site: Left Upper Arm   Position: Sitting   Cuff Size: Medium Adult   Pulse: 63   Resp: 16   SpO2: 98%   Weight: 51.1 kg (112 lb 9.6 oz)   Height: 1.6 m (5' 3\")         Chest pain:  NO    Have you been to the ER, urgent care, or hospitalized outside of [facility] since your last visit?   NO    Refills:  NO

## 2024-02-09 ENCOUNTER — TELEPHONE (OUTPATIENT)
Age: 84
End: 2024-02-09

## 2024-02-09 NOTE — TELEPHONE ENCOUNTER
Pts son in law Jose Rios called regarding a billing issue for remotes.  He stated that pts check on 5/22/23 is causing an issue & he keeps getting a bill.  It is for $316.00.  Billing ID# [id number].    Jose stated he takes care of pts bills & he has never had this before.  He called the [facility] billing dept & Kellie.  He stated he was informed by billing dept that they need a copy of the interr report & requested it 2x from the office.  Informed him that we do not have any request for it & the copy of that report is scanned in pts chart.    He stated when he talked to [employer] that it could be due to wrong CPT code & timing being too soon.  He said it was resubmitted on 8/16 & 10/17.      Jose talked to [facility] billing dept today 2/9/24 & they said they will put the bill on hold while they try to get it fixed.  He is upset because he said it is falling on him to fix when this has never happened before.  Informed him we will look into it & call him back when we find out what is going on.  Jose can be reached [phone].

## 2024-03-25 DIAGNOSIS — Z95.810 PRESENCE OF AUTOMATIC (IMPLANTABLE) CARDIAC DEFIBRILLATOR: ICD-10-CM

## 2024-03-25 DIAGNOSIS — Z79.01 LONG TERM (CURRENT) USE OF ANTICOAGULANTS: ICD-10-CM

## 2024-03-25 DIAGNOSIS — I25.5 ISCHEMIC CARDIOMYOPATHY: ICD-10-CM

## 2024-03-25 DIAGNOSIS — G45.9 TRANSIENT CEREBRAL ISCHEMIA, UNSPECIFIED TYPE: ICD-10-CM

## 2024-03-25 DIAGNOSIS — I48.4 ATYPICAL ATRIAL FLUTTER (HCC): ICD-10-CM

## 2024-07-08 NOTE — PROGRESS NOTES
CARDIOLOGY OFFICE NOTE    Primary Cardiologist:  Ashish Ulloa MD, [facility]    [address], [place] [address]  Phone [phone]; Fax [phone]  Mobile [phone]   Voice Mail [phone]    Primary care: Daria Chavarria MD       ATTENTION:   This medical record was transcribed using an electronic medical records/speech recognition system.  Although proofread, it may and can contain electronic, spelling and other errors.  Corrections may be executed at a later time.  Please feel free to contact us for any clarifications as needed.             Ian Larson is a 84 y.o. male with  referred for CAD, cardiomyopathy and ICD.          Cardiac risk factors: advanced age (older than 55 for men, 65 for women), dyslipidemia, and male gender  I have personally obtained the history from the patient.    HISTORY OF PRESENTING ILLNESS    Ms./Mr. Ian Larson  84 y.o. is   a former .  Comes in with his daughter today.  He tells me that his wife passed away last month..  He has a history of a cardiomyopathy and an ICD and he had a scheduled appointment later on this month with the EP and I told him that.  Is been doing well no chest pain shortness of breath.  He is lost significant amount of weight and today we have adjusted some of his medicines.    His daughter states that he does have a fall risk and has been unstable/stopped his Eliquis and put on aspirin 81 mg a day.  They understand the risk of stroke and embolic events will be at this point believe the risk is greater than the benefits    Because of his weight loss I have adjusted his Crestor to half a tablet a day.  -----------------    Here with daughter. Denies CP, SOB, palpitations. Pt complains of frequent dizziness. BP borderline.      ACTIVE PROBLEM LIST     Patient Active Problem List    Diagnosis Date Noted    Anticoagulation adequate 12/13/2022    TIA (transient ischemic attack) 10/28/2019

## 2024-07-16 ENCOUNTER — OFFICE VISIT (OUTPATIENT)
Age: 84
End: 2024-07-16
Payer: COMMERCIAL

## 2024-07-16 VITALS
BODY MASS INDEX: 20.2 KG/M2 | DIASTOLIC BLOOD PRESSURE: 60 MMHG | HEART RATE: 68 BPM | RESPIRATION RATE: 16 BRPM | WEIGHT: 114 LBS | OXYGEN SATURATION: 97 % | SYSTOLIC BLOOD PRESSURE: 98 MMHG | HEIGHT: 63 IN

## 2024-07-16 DIAGNOSIS — E78.2 MIXED HYPERLIPIDEMIA: ICD-10-CM

## 2024-07-16 DIAGNOSIS — I25.10 CORONARY ARTERY DISEASE INVOLVING NATIVE CORONARY ARTERY OF NATIVE HEART WITHOUT ANGINA PECTORIS: ICD-10-CM

## 2024-07-16 DIAGNOSIS — G45.9 TRANSIENT CEREBRAL ISCHEMIA, UNSPECIFIED TYPE: ICD-10-CM

## 2024-07-16 DIAGNOSIS — I48.4 ATYPICAL ATRIAL FLUTTER (HCC): ICD-10-CM

## 2024-07-16 DIAGNOSIS — Z95.810 ICD (IMPLANTABLE CARDIOVERTER-DEFIBRILLATOR) IN PLACE: Primary | ICD-10-CM

## 2024-07-16 PROCEDURE — 93000 ELECTROCARDIOGRAM COMPLETE: CPT

## 2024-07-16 PROCEDURE — 99214 OFFICE O/P EST MOD 30 MIN: CPT

## 2024-07-16 PROCEDURE — 1123F ACP DISCUSS/DSCN MKR DOCD: CPT

## 2024-07-16 RX ORDER — ROSUVASTATIN CALCIUM 10 MG/1
10 TABLET, COATED ORAL NIGHTLY
Qty: 90 TABLET | Refills: 3 | Status: SHIPPED | OUTPATIENT
Start: 2024-07-16

## 2024-07-16 RX ORDER — METOPROLOL SUCCINATE 25 MG/1
12.5 TABLET, EXTENDED RELEASE ORAL DAILY
Qty: 90 TABLET | Refills: 3 | Status: SHIPPED | OUTPATIENT
Start: 2024-07-16

## 2024-07-16 RX ORDER — ROPINIROLE 1 MG/1
TABLET, FILM COATED ORAL 3 TIMES DAILY
COMMUNITY
Start: 2024-05-29

## 2024-07-16 NOTE — PROGRESS NOTES
had concerns including Cardiomyopathy (Ischemic ) and Other (Aflutter).    Vitals:    07/16/24 1509   BP: 98/60   Site: Left Upper Arm   Position: Sitting   Pulse: 68   Resp: 16   SpO2: 97%   Weight: 51.7 kg (114 lb)   Height: 1.6 m (5' 3\")        Chest pain No    Refills No        1. Have you been to the ER, urgent care clinic since your last visit? No       Hospitalized since your last visit? No       Where?        Date?

## 2024-07-17 ENCOUNTER — TELEPHONE (OUTPATIENT)
Age: 84
End: 2024-07-17

## 2024-07-17 NOTE — TELEPHONE ENCOUNTER
Patients daughter is calling in regards to patient was in office yesterday 7/16/2024 and she states one medication was skipped and she is not sure if her father suppose to be taking the medication CARVEDILOL 3.125 mg or not. She would like a call back for clarification.     Patients chemo [facility] phone# [phone]

## 2024-07-17 NOTE — TELEPHONE ENCOUNTER
Patient's son in law is calling because his father in law was given a new prescription for metoprolol succinate 25 mg and they are trying to make sure that the Carvedilol 3.125 mg will not interfere with each other.    Please advise.Patient's son in law said he would like to make sure that the NP is aware he is taking the Carvedilol.Patient's son in law would like a call back.    Patient has not been given his medication yet until they are advised.    [phone] Jose (son in law)

## 2024-07-29 ENCOUNTER — TELEPHONE (OUTPATIENT)
Age: 84
End: 2024-07-29

## 2024-07-29 NOTE — TELEPHONE ENCOUNTER
Patient's son in law is calling because his father in law needs a new prescription for Metoprolol because he threw the medication away by mistake.    Patient is currently not taking any medication.Patient was change from Carvedilol to Metoprolol.Confirmed appointment.Please give a call back to the son in law.    [phone] Jose (son in law)

## 2024-07-29 NOTE — TELEPHONE ENCOUNTER
Received call from patient's son in law Jose, patient ID verified using two patient identifiers. Jose states that patient threw away his Metoprolol by mistake, and needs us to call the pharmacy to get them to do an over ride to get the medication refilled. Confirmed pharmacy and advised Jose that I would call them.    Called [facility] pharmacy in [place] and spoke with Merlene. Patient ID verified using two patient identifiers. Explained to her that patient had thrown away his Metoprolol. She states that he has refills and she can refill it for him. Insurance will not cover the cost, but medication will only be $13.50. She will fill prescription now.    Called Jose and let him know that medication is being filled and that it will cost $13.50 out of pocket since insurance won't cover the replacement prescription. Jose verbalizes understanding and is agreeable to this plan.

## 2024-12-26 ENCOUNTER — TELEPHONE (OUTPATIENT)
Age: 84
End: 2024-12-26

## 2024-12-26 NOTE — TELEPHONE ENCOUNTER
Called pt in regards to remote transmission. ID verified using two patient identifiers with pt and his daughter. Pt denies any SOB, cough, or signs of fluid overload at this time.    Opportunities for questions, clarifications, and concerns provided.

## 2025-01-06 PROCEDURE — 93297 REM INTERROG DEV EVAL ICPMS: CPT | Performed by: INTERNAL MEDICINE

## 2025-01-22 ENCOUNTER — OFFICE VISIT (OUTPATIENT)
Age: 85
End: 2025-01-22
Payer: MEDICARE

## 2025-01-22 VITALS
SYSTOLIC BLOOD PRESSURE: 116 MMHG | WEIGHT: 112 LBS | HEIGHT: 63 IN | DIASTOLIC BLOOD PRESSURE: 80 MMHG | OXYGEN SATURATION: 97 % | HEART RATE: 70 BPM | BODY MASS INDEX: 19.84 KG/M2

## 2025-01-22 DIAGNOSIS — I25.5 ISCHEMIC CARDIOMYOPATHY: ICD-10-CM

## 2025-01-22 DIAGNOSIS — I48.4 ATYPICAL ATRIAL FLUTTER (HCC): Primary | ICD-10-CM

## 2025-01-22 DIAGNOSIS — E78.2 MIXED HYPERLIPIDEMIA: ICD-10-CM

## 2025-01-22 DIAGNOSIS — I25.10 CORONARY ARTERY DISEASE INVOLVING NATIVE CORONARY ARTERY OF NATIVE HEART WITHOUT ANGINA PECTORIS: ICD-10-CM

## 2025-01-22 DIAGNOSIS — Z95.810 ICD (IMPLANTABLE CARDIOVERTER-DEFIBRILLATOR) IN PLACE: ICD-10-CM

## 2025-01-22 PROCEDURE — 93005 ELECTROCARDIOGRAM TRACING: CPT | Performed by: INTERNAL MEDICINE

## 2025-01-22 PROCEDURE — 93010 ELECTROCARDIOGRAM REPORT: CPT | Performed by: INTERNAL MEDICINE

## 2025-01-22 PROCEDURE — 99214 OFFICE O/P EST MOD 30 MIN: CPT | Performed by: INTERNAL MEDICINE

## 2025-01-22 PROCEDURE — 1036F TOBACCO NON-USER: CPT | Performed by: INTERNAL MEDICINE

## 2025-01-22 PROCEDURE — 1126F AMNT PAIN NOTED NONE PRSNT: CPT | Performed by: INTERNAL MEDICINE

## 2025-01-22 PROCEDURE — G8427 DOCREV CUR MEDS BY ELIG CLIN: HCPCS | Performed by: INTERNAL MEDICINE

## 2025-01-22 PROCEDURE — 1123F ACP DISCUSS/DSCN MKR DOCD: CPT | Performed by: INTERNAL MEDICINE

## 2025-01-22 PROCEDURE — 1159F MED LIST DOCD IN RCRD: CPT | Performed by: INTERNAL MEDICINE

## 2025-01-22 PROCEDURE — G8420 CALC BMI NORM PARAMETERS: HCPCS | Performed by: INTERNAL MEDICINE

## 2025-01-22 NOTE — PROGRESS NOTES
Chief Complaint   Patient presents with    Atrial Flutter    Coronary Artery Disease    Transient Ischemic Attack     Vitals:    01/22/25 1449   BP: 116/80   Site: Left Upper Arm   Position: Sitting   Pulse: 70   SpO2: 97%   Weight: 50.8 kg (112 lb)   Height: 1.6 m (5' 3\")       Chest pain NO     ER, urgent care, or hospitalized outside of [facility] since your last visit?  NO     Refills NO

## 2025-01-22 NOTE — PROGRESS NOTES
Office Follow-up    NAME: Ian Larson   :  1940  MRM:  [id number]    Date:  2025         Assessment and Plan:        Hx of ICD implant now with battery RADHA   -Follows with EP       Atrial flutter - no s/sxs   Ventricular pacing intermittent.   - Stopped Coreg due to dizzineSss  -Better on Toprol 12.5mg nightly for rate control  -Cont ASA 81mg/day  -At this time Eliquis is more of a risk than the benefit.    Discussed the risk for stroke and other complications while not on anticoagulation.  Patient and family aware of risks and able to verbalize.  Patient choosing not to remain anticoagulated at this time.      CAD   -No complaints of angina or anginal equivalents  -Cont ASA, statin, BB     Dyslipidemia   - followed by Dr. Coleman.     -LDL is at goal   rosuvastatin - 10 MG     Hx of TIA   - on ASA, statin     Parkinsons:   Continue Carbidopa and Levodopa.    See Jamia Jett NP in 6 months.       ATTENTION:   This medical record was transcribed using an electronic medical records/speech recognition system.  Although proofread, it may and can contain electronic, spelling and other errors.  Corrections may be executed at a later time.  Please feel free to contact us for any clarifications as needed.      Subjective:     Ian Larson, a 84 y.o. year-old who presents for followup. No chest pain, SOB,.     Exam:     /80 (Site: Left Upper Arm, Position: Sitting)   Pulse 70   Ht 1.6 m (5' 3\")   Wt 50.8 kg (112 lb)   SpO2 97%   BMI 19.84 kg/m²      General appearance - alert, well appearing, and in no distress  Mental status - affect appropriate to mood  Eyes - sclera anicteric, moist mucous membranes  Neck - supple, no significant adenopathy    Medications:     Current Outpatient Medications   Medication Sig    rOPINIRole (REQUIP) 1 MG tablet Take by mouth 3 times daily    rosuvastatin (CRESTOR) 10 MG tablet Take 1 tablet by mouth nightly    metoprolol succinate (TOPROL XL) 25

## 2025-03-07 ENCOUNTER — APPOINTMENT (OUTPATIENT)
Facility: HOSPITAL | Age: 85
End: 2025-03-07
Payer: MEDICARE

## 2025-03-07 ENCOUNTER — HOSPITAL ENCOUNTER (EMERGENCY)
Facility: HOSPITAL | Age: 85
Discharge: HOME | End: 2025-03-07
Attending: STUDENT IN AN ORGANIZED HEALTH CARE EDUCATION/TRAINING PROGRAM
Payer: MEDICARE

## 2025-03-07 VITALS
SYSTOLIC BLOOD PRESSURE: 159 MMHG | HEART RATE: 88 BPM | DIASTOLIC BLOOD PRESSURE: 95 MMHG | OXYGEN SATURATION: 97 % | TEMPERATURE: 97.7 F | RESPIRATION RATE: 18 BRPM
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
## 2025-03-07 DIAGNOSIS — S01.01XA LACERATION OF SCALP, INITIAL ENCOUNTER: ICD-10-CM

## 2025-03-07 PROCEDURE — 99284 EMERGENCY DEPT VISIT MOD MDM: CPT

## 2025-03-07 PROCEDURE — 12002 RPR S/N/AX/GEN/TRNK2.6-7.5CM: CPT

## 2025-03-07 PROCEDURE — 72125 CT NECK SPINE W/O DYE: CPT

## 2025-03-07 PROCEDURE — 6370000000 HC RX 637 (ALT 250 FOR IP): Performed by: STUDENT IN AN ORGANIZED HEALTH CARE EDUCATION/TRAINING PROGRAM

## 2025-03-07 PROCEDURE — 70450 CT HEAD/BRAIN W/O DYE: CPT

## 2025-03-07 RX ORDER — GINSENG 100 MG
CAPSULE ORAL
Status: COMPLETED | OUTPATIENT
Start: 2025-03-07 | End: 2025-03-07

## 2025-03-07 RX ORDER — BACITRACIN ZINC AND POLYMYXIN B SULFATE 500; 1000 [USP'U]/G; [USP'U]/G
OINTMENT TOPICAL
Qty: 15 G | Refills: 0 | Status: SHIPPED | OUTPATIENT
Start: 2025-03-07 | End: 2025-03-14

## 2025-03-07 RX ADMIN — BACITRACIN 1 EACH: 500 OINTMENT TOPICAL at 22:04

## 2025-03-07 RX ADMIN — Medication 3 ML: at 21:19

## 2025-03-07 ASSESSMENT — PAIN DESCRIPTION - ORIENTATION
ORIENTATION: POSTERIOR
ORIENTATION: POSTERIOR

## 2025-03-07 ASSESSMENT — PAIN - FUNCTIONAL ASSESSMENT
PAIN_FUNCTIONAL_ASSESSMENT: ACTIVITIES ARE NOT PREVENTED
PAIN_FUNCTIONAL_ASSESSMENT: 0-10
PAIN_FUNCTIONAL_ASSESSMENT: ACTIVITIES ARE NOT PREVENTED
PAIN_FUNCTIONAL_ASSESSMENT: 0-10

## 2025-03-07 ASSESSMENT — PAIN DESCRIPTION - PAIN TYPE: TYPE: ACUTE PAIN

## 2025-03-07 ASSESSMENT — PAIN SCALES - GENERAL
PAINLEVEL_OUTOF10: 0
PAINLEVEL_OUTOF10: 3
PAINLEVEL_OUTOF10: 0

## 2025-03-07 ASSESSMENT — PAIN DESCRIPTION - DESCRIPTORS
DESCRIPTORS: ACHING
DESCRIPTORS: ACHING

## 2025-03-07 ASSESSMENT — PAIN DESCRIPTION - FREQUENCY: FREQUENCY: CONTINUOUS

## 2025-03-07 ASSESSMENT — PAIN DESCRIPTION - LOCATION
LOCATION: HEAD
LOCATION: HEAD

## 2025-03-08 NOTE — DISCHARGE INSTRUCTIONS
You have 3 staples placed in the cut on the back of your head.  Follow the attached instruction sheet regarding management of the wound and staples.  Apply the prescribed bacitracin ointment to prevent infection.  Return to the ER for bleeding, drainage, fevers or any other concerns.  Otherwise you need to follow-up in 7 to 10 days to have the staples removed.  This can be done at any emergency room, urgent care or primary doctor's office.

## 2025-03-08 NOTE — ED TRIAGE NOTES
Pt reports that he was looking up and fell back and struck the back of his head on the corner of a medal box.  Pt denies LOC.  Pt has hematoma and bleeding present on arrival.  Incident occurred at approx. 1030 this am.

## 2025-03-10 NOTE — ED PROVIDER NOTES
[facility]  EMERGENCY DEPARTMENT ENCOUNTER      Pt Name: Ian Larson  MRN: [id number]  Birthdate 1940  Date of evaluation: 3/7/2025  Provider: Montana Rivera MD    CHIEF COMPLAINT       Chief Complaint   Patient presents with    Fall       HISTORY OF PRESENT ILLNESS    HPI    85-year-old male history of Parkinson's and pacemaker here for fall.  States was in his workshop, and up quickly lost his balance and fell backwards off of a stool.  Hit the back of his head on a metal box.  Denies losing consciousness, has been able to stand up and ambulate afterward.  He has occipital hematoma with small laceration.  He denies vision changes numbness tingling weakness shortness of breath chest pain or any other constitutional symptoms.    Nursing notes reviewed.    REVIEW OF SYSTEMS     Review of Systems  Unless otherwise stated, a complete review of systems was asked of the patient. Pertinent positives are noted in the HPI section.    PAST MEDICAL HISTORY     Past Medical History:   Diagnosis Date    Pacemaker     Parkinson's disease (HCC)        SURGICAL HISTORY     History reviewed. No pertinent surgical history.    CURRENT MEDICATIONS       Discharge Medication List as of 3/7/2025 10:01 PM        CONTINUE these medications which have NOT CHANGED    Details   rOPINIRole (REQUIP) 1 MG tablet Take by mouth 3 times dailyHistorical Med      rosuvastatin (CRESTOR) 10 MG tablet Take 1 tablet by mouth nightly, Disp-90 tablet, R-3Normal      metoprolol succinate (TOPROL XL) 25 MG extended release tablet Take 0.5 tablets by mouth daily, Disp-90 tablet, R-3Normal      aspirin 81 MG EC tablet Take 1 tablet by mouth daily, Disp-90 tablet, R-1NO PRINT      carbidopa-levodopa (SINEMET)  MG per tablet Take 1 tablet by mouth 2 times dailyHistorical Med      memantine (NAMENDA) 10 MG tablet Take 1 tablet by mouth 2 times dailyHistorical Med      nitroGLYCERIN (NITROSTAT) 0.4 MG SL tablet Place

## 2025-03-17 ENCOUNTER — HOSPITAL ENCOUNTER (EMERGENCY)
Facility: HOSPITAL | Age: 85
Discharge: HOME | End: 2025-03-17
Attending: EMERGENCY MEDICINE
Payer: MEDICARE

## 2025-03-17 VITALS
RESPIRATION RATE: 18 BRPM | SYSTOLIC BLOOD PRESSURE: 104 MMHG | TEMPERATURE: 97.5 F | DIASTOLIC BLOOD PRESSURE: 56 MMHG | WEIGHT: 112 LBS | OXYGEN SATURATION: 98 % | HEIGHT: 63 IN | BODY MASS INDEX: 19.84 KG/M2 | HEART RATE: 80 BPM

## 2025-03-17 DIAGNOSIS — Z48.02 ENCOUNTER FOR STAPLE REMOVAL: Primary | ICD-10-CM

## 2025-03-17 PROCEDURE — 4500000002 HC ER NO CHARGE

## 2025-03-17 PROCEDURE — 99282 EMERGENCY DEPT VISIT SF MDM: CPT

## 2025-03-17 ASSESSMENT — LIFESTYLE VARIABLES: HOW OFTEN DO YOU HAVE A DRINK CONTAINING ALCOHOL: NEVER

## 2025-03-17 ASSESSMENT — PAIN SCALES - GENERAL: PAINLEVEL_OUTOF10: 0

## 2025-03-17 NOTE — ED TRIAGE NOTES
Patient presents ambulatory to treatment area with a slow, shuffling gait accompanied by daughter. Patient had staples placed to posterior scalp 10 days ago at this facility. States he has had no issues with the staples since insertion.

## 2025-03-17 NOTE — ED NOTES
The patient was discharged home by Dr Cui  in stable condition. The patient is alert and oriented, in no respiratory distress and discharge vital signs obtained. The patient's diagnosis, condition and treatment were explained. The patient expressed understanding. No prescriptions given/e-scribed to pharmacy. No work/school note given. A discharge plan has been developed. A  was not involved in the process. Aftercare instructions were given.  Pt ambulatory out of the ED with family.

## 2025-03-19 NOTE — ED PROVIDER NOTES
[facility]  EMERGENCY DEPARTMENT ENCOUNTER      Pt Name: Ian Larson  MRN: [id number]  Birthdate 1940  Date of evaluation: 3/17/2025  Provider: Ottoniel Cui MD    CHIEF COMPLAINT       Chief Complaint   Patient presents with    Suture / Staple Removal         HISTORY OF PRESENT ILLNESS   (Location/Symptom, Timing/Onset, Context/Setting, Quality, Duration, Modifying Factors, Severity)  Note limiting factors.   85-year-old with a history of hyperlipidemia, coronary disease status post STEMI, ischemic cardiomyopathy, TIA, AAA, status post AICD.  He presents for staple removal.  He was seen here 10 days ago for a scalp laceration after a fall.  He had 2 staples placed at the time.  He has been doing well since then.          Review of External Medical Records:     Nursing Notes were reviewed.    REVIEW OF SYSTEMS    (2-9 systems for level 4, 10 or more for level 5)     Review of Systems    Except as noted above the remainder of the review of systems was reviewed and negative.       PAST MEDICAL HISTORY     Past Medical History:   Diagnosis Date    Pacemaker     Parkinson's disease (HCC)          SURGICAL HISTORY     History reviewed. No pertinent surgical history.      CURRENT MEDICATIONS       Discharge Medication List as of 3/17/2025 10:50 AM        CONTINUE these medications which have NOT CHANGED    Details   rOPINIRole (REQUIP) 1 MG tablet Take by mouth 3 times dailyHistorical Med      rosuvastatin (CRESTOR) 10 MG tablet Take 1 tablet by mouth nightly, Disp-90 tablet, R-3Normal      metoprolol succinate (TOPROL XL) 25 MG extended release tablet Take 0.5 tablets by mouth daily, Disp-90 tablet, R-3Normal      aspirin 81 MG EC tablet Take 1 tablet by mouth daily, Disp-90 tablet, R-1NO PRINT      carbidopa-levodopa (SINEMET)  MG per tablet Take 1 tablet by mouth 2 times dailyHistorical Med      memantine (NAMENDA) 10 MG tablet Take 1 tablet by mouth 2 times dailyHistorical  findings:        Interpretation per the Radiologist below, if available at the time of this note:    No orders to display        LABS:  Labs Reviewed - No data to display    All other labs were within normal range or not returned as of this dictation.    EMERGENCY DEPARTMENT COURSE and DIFFERENTIAL DIAGNOSIS/MDM:   Vitals:    Vitals:    03/17/25 1000   BP: (!) 104/56   Pulse: 80   Resp: 18   Temp: 97.5 °F (36.4 °C)   TempSrc: Oral   SpO2: 98%   Weight: 50.8 kg (112 lb)   Height: 1.6 m (5' 3\")           Medical Decision Making          REASSESSMENT            CONSULTS:  None    PROCEDURES:  Unless otherwise noted below, none     Procedures      FINAL IMPRESSION      1. Encounter for staple removal        Assessment/plan: 85-year-old who presents for staple removal from his scalp.  Removed without incident.  Wound care instructions and return precautions.  Ottoniel Cui MD    DISPOSITION/PLAN   DISPOSITION Decision To Discharge 03/17/2025 10:49:53 AM      PATIENT REFERRED TO:  Daria Chavarria MD  [address]  [facility] [address] [phone]      As needed    [facility]  [address]  [facility] [address]  [phone]    As needed      DISCHARGE MEDICATIONS:  Discharge Medication List as of 3/17/2025 10:50 AM            (Please note that portions of this note were completed with a voice recognition program.  Efforts were made to edit the dictations but occasionally words are mis-transcribed.)    Ottoniel Cui MD (electronically signed)  Emergency Attending Physician / Physician Assistant / Nurse Practitioner             Ottoniel Cui MD  03/19/25 0721

## 2025-04-10 NOTE — H&P
HISTORY OF PRESENTING ILLNESS      Chico Ly is a 78 y.o. male with ischemic cardiomyopathy, CAD/CABG, prior myocardial infarction, atrial flutter, dyslipidemia and dual chamber Medtronic ICD (upgraded from dual chamber pacemaker) at [facility].        PAST MEDICAL HISTORY           Past Medical History:   Diagnosis Date    Ill-defined condition       MI    Parkinson's disease ([facility] [address]      Vitamin D deficiency 5/5/2017             PAST SURGICAL HISTORY      Past Surgical History:   Procedure Laterality Date    CARDIAC SURG PROCEDURE UNLIST        HX ORTHOPAEDIC         L hip replacement, R shoulder replacement.  HX PACEMAKER        INS PPM/ICD LED DUAL ONLY   7/13/2015          INS PPM/ICD LED DUAL ONLY   3/1/2016                   ALLERGIES      No Known Allergies         FAMILY HISTORY      No family history on file. negative for cardiac disease         SOCIAL HISTORY      Social History               Socioeconomic History    Marital status:        Spouse name: Not on file    Number of children: Not on file    Years of education: Not on file    Highest education level: Not on file   Tobacco Use    Smoking status: Never Smoker    Smokeless tobacco: Never Used   Substance and Sexual Activity    Alcohol use: No    Drug use: No    Sexual activity: Not Currently               MEDICATIONS           Current Outpatient Medications   Medication Sig    memantine (NAMENDA) 10 mg tablet TAKE AS DIRECTED ONE HALF TABLET BY MOUTH DAILY FOR 1 WEEK THEN ONE HALF TABLET TWICE DAILY FOR 1 WEEK THEN ONE TABLET TWICE DAILY. DO NOT S    rOPINIRole (REQUIP) 1 mg tablet TAKE ONE HALF TO ONE TABLET BY MOUTH 1 TO 3 HOURS BEFORE BEDTIME ONCE DAILY    calcium polycarbophil (FIBERCON) 625 mg tablet Take 1 Tab by mouth daily.  bismuth subsalicylate (PEPTO-BISMOL) 262 mg/15 mL suspension Take 30 mL by mouth every four (4) hours as needed for Indigestion (diarrhea).  May take 30 mL every 30 minutes as needed for up to 8 doses initially.  carvedilol (COREG) 6.25 mg tablet Take 6.25 mg by mouth two (2) times daily (with meals).  rosuvastatin (CRESTOR) 20 mg tablet Take 20 mg by mouth nightly.  apixaban (ELIQUIS) 5 mg tablet Take 1 Tab by mouth two (2) times a day.  nitroglycerin (NITROSTAT) 0.4 mg SL tablet 1 Tab by SubLINGual route every five (5) minutes as needed for Chest Pain.  aspirin delayed-release 81 mg tablet Take 1 Tab by mouth daily.  multivitamin (ONE A DAY) tablet Take 1 Tab by mouth daily.  coenzyme q10-vitamin e (COQ10 ) 100-100 mg-unit cap Take 1 Tab by mouth daily.  carbidopa-levodopa (SINEMET)  mg per tablet Take 2 Tabs by mouth three (3) times daily.      No current facility-administered medications for this visit.          I have reviewed the nurses notes, vitals, problem list, allergy list, medical history, family, social history and medications.         REVIEW OF SYMPTOMS      General: Pt denies excessive weight gain or loss. Pt is able to conduct ADL's  HEENT: Denies blurred vision, headaches, hearing loss, epistaxis and difficulty swallowing. Respiratory: Denies cough, congestion, shortness of breath, SOTELO, wheezing or stridor. Cardiovascular: Denies precordial pain, palpitations, edema or PND  Gastrointestinal: Denies poor appetite, indigestion, abdominal pain or blood in stool  Genitourinary: Denies hematuria, dysuria, increased urinary frequency  Musculoskeletal: Denies joint pain or swelling from muscles or joints  Neurologic: Denies tremor, paresthesias, headache, or sensory motor disturbance  Psychiatric: Denies confusion, insomnia, depression  Integumentray: Denies rash, itching or ulcers. Hematologic: Denies easy bruising, bleeding         PHYSICAL EXAMINATION      Vitals: see vitals section  General: Well developed, in no acute distress.   HEENT: No jaundice, oral mucosa moist, no oral ulcers  Neck: Supple, no stiffness, no lymphadenopathy, No supple  Heart:  Normal S1/S2 negative S3 or S4. Regular, no murmur, gallop or rub, no jugular venous distention  Respiratory: Clear bilaterally x 4, no wheezing or rales  Abdomen:   Soft, non-tender, bowel sounds are active.   Extremities:  No edema, normal cap refill, no cyanosis. Musculoskeletal: No clubbing, no deformities  Neuro: A&Ox3, speech clear, gait stable, cooperative, no focal neurologic deficits  Skin: Skin color is normal. No rashes or lesions. Non diaphoretic, moist.  Vascular: 2+ pulses symmetric in all extremities         DIAGNOSTIC DATA      EKG:          LABORATORY DATA            Lab Results   Component Value Date/Time     WBC 6.6 11/04/2019 09:23 AM     Hemoglobin (POC) 16.3 07/10/2015 08:24 PM     HGB 13.2 11/04/2019 09:23 AM     Hematocrit (POC) 48 07/10/2015 08:24 PM     HCT 40.5 11/04/2019 09:23 AM     PLATELET 83 (L) 89/74/7052 09:23 AM     MCV 96.0 11/04/2019 09:23 AM            Lab Results   Component Value Date/Time     Sodium 137 11/04/2019 09:23 AM     Potassium 4.2 11/04/2019 09:23 AM     Chloride 107 11/04/2019 09:23 AM     CO2 26 11/04/2019 09:23 AM     Anion gap 4 (L) 11/04/2019 09:23 AM     Glucose 84 11/04/2019 09:23 AM     BUN 29 (H) 11/04/2019 09:23 AM     Creatinine 1.06 11/04/2019 09:23 AM     BUN/Creatinine ratio 27 (H) 11/04/2019 09:23 AM     GFR est AA >60 11/04/2019 09:23 AM     GFR est non-AA >60 11/04/2019 09:23 AM     Calcium 9.0 11/04/2019 09:23 AM     Bilirubin, total 0.8 11/04/2019 09:23 AM     Alk. phosphatase 122 (H) 11/04/2019 09:23 AM     Protein, total 6.9 11/04/2019 09:23 AM     Albumin 3.8 11/04/2019 09:23 AM     Globulin 3.1 11/04/2019 09:23 AM     A-G Ratio 1.2 11/04/2019 09:23 AM     ALT (SGPT) 13 11/04/2019 09:23 AM             ASSESSMENT      1. ICD              A. Dual chamber              B. Medtronic              C. Upgraded from dual chamber pacemaker   2. CAD, native  3. CABG  4. Myocardial infarction  5. Dyslipidemia  6.  Ischemic cardiomyopathy         PLAN      Plan for ICD generator change with Medtronic using conscious sedation.       \     Vickki Lesches, MD  Cardiac Electrophysiology / Cardiology     [address] [place] [address]  [address] [facility], [address] [address] [address], [address]  [place] Hammond                                          Talia Connor  [phone] / [phone] Fax                                    [phone] / [phone] Fax

## 2025-04-16 ENCOUNTER — OFFICE VISIT (OUTPATIENT)
Age: 85
End: 2025-04-16
Payer: MEDICARE

## 2025-04-16 ENCOUNTER — PROCEDURE VISIT (OUTPATIENT)
Age: 85
End: 2025-04-16
Payer: MEDICARE

## 2025-04-16 VITALS
WEIGHT: 115 LBS | RESPIRATION RATE: 16 BRPM | DIASTOLIC BLOOD PRESSURE: 60 MMHG | BODY MASS INDEX: 20.38 KG/M2 | HEART RATE: 67 BPM | SYSTOLIC BLOOD PRESSURE: 90 MMHG | HEIGHT: 63 IN | OXYGEN SATURATION: 98 %

## 2025-04-16 DIAGNOSIS — Z95.810 S/P ICD (INTERNAL CARDIAC DEFIBRILLATOR) PROCEDURE: ICD-10-CM

## 2025-04-16 DIAGNOSIS — I48.4 ATYPICAL ATRIAL FLUTTER: Primary | ICD-10-CM

## 2025-04-16 DIAGNOSIS — I21.3 ST ELEVATION MYOCARDIAL INFARCTION (STEMI), UNSPECIFIED ARTERY (HCC): ICD-10-CM

## 2025-04-16 DIAGNOSIS — G45.9 TIA (TRANSIENT ISCHEMIC ATTACK): ICD-10-CM

## 2025-04-16 DIAGNOSIS — I25.5 ISCHEMIC CARDIOMYOPATHY: ICD-10-CM

## 2025-04-16 DIAGNOSIS — I45.9 HEART BLOCK: ICD-10-CM

## 2025-04-16 DIAGNOSIS — Z95.810 ICD (IMPLANTABLE CARDIOVERTER-DEFIBRILLATOR) IN PLACE: Primary | ICD-10-CM

## 2025-04-16 DIAGNOSIS — I25.10 CORONARY ARTERY DISEASE INVOLVING NATIVE CORONARY ARTERY OF NATIVE HEART WITHOUT ANGINA PECTORIS: ICD-10-CM

## 2025-04-16 DIAGNOSIS — E78.5 HYPERLIPIDEMIA, UNSPECIFIED HYPERLIPIDEMIA TYPE: ICD-10-CM

## 2025-04-16 PROCEDURE — 93297 REM INTERROG DEV EVAL ICPMS: CPT | Performed by: INTERNAL MEDICINE

## 2025-04-16 PROCEDURE — 1126F AMNT PAIN NOTED NONE PRSNT: CPT | Performed by: INTERNAL MEDICINE

## 2025-04-16 PROCEDURE — G8427 DOCREV CUR MEDS BY ELIG CLIN: HCPCS | Performed by: INTERNAL MEDICINE

## 2025-04-16 PROCEDURE — 1123F ACP DISCUSS/DSCN MKR DOCD: CPT | Performed by: INTERNAL MEDICINE

## 2025-04-16 PROCEDURE — G8420 CALC BMI NORM PARAMETERS: HCPCS | Performed by: INTERNAL MEDICINE

## 2025-04-16 PROCEDURE — G2211 COMPLEX E/M VISIT ADD ON: HCPCS | Performed by: INTERNAL MEDICINE

## 2025-04-16 PROCEDURE — 99214 OFFICE O/P EST MOD 30 MIN: CPT | Performed by: INTERNAL MEDICINE

## 2025-04-16 PROCEDURE — 1036F TOBACCO NON-USER: CPT | Performed by: INTERNAL MEDICINE

## 2025-04-16 PROCEDURE — 1160F RVW MEDS BY RX/DR IN RCRD: CPT | Performed by: INTERNAL MEDICINE

## 2025-04-16 PROCEDURE — 1159F MED LIST DOCD IN RCRD: CPT | Performed by: INTERNAL MEDICINE

## 2025-04-16 PROCEDURE — 93283 PRGRMG EVAL IMPLANTABLE DFB: CPT | Performed by: INTERNAL MEDICINE

## 2025-04-16 ASSESSMENT — PATIENT HEALTH QUESTIONNAIRE - PHQ9
SUM OF ALL RESPONSES TO PHQ QUESTIONS 1-9: 0
2. FEELING DOWN, DEPRESSED OR HOPELESS: NOT AT ALL
1. LITTLE INTEREST OR PLEASURE IN DOING THINGS: NOT AT ALL
DEPRESSION UNABLE TO ASSESS: PT REFUSES
SUM OF ALL RESPONSES TO PHQ QUESTIONS 1-9: 0

## 2025-04-16 NOTE — PROGRESS NOTES
Chief Complaint   Patient presents with    Follow-up     Heart Block, A-Flutter      S/P Fall with ER visit     Having some dizziness. No chest pain or Shortness of breath.   Having Fatigue

## 2025-04-16 NOTE — PROGRESS NOTES
Cardiac Electrophysiology Office Follow-up    NAME: Ian Larson   :  1940  MRM:  [id number]    Date:  2025      Assessment and Plan:     1. Atypical atrial flutter  2. Coronary artery disease involving native coronary artery of native heart without angina pectoris  3. Heart block  4. Ischemic cardiomyopathy  5. ST elevation myocardial infarction (STEMI), unspecified artery (HCC)  6. TIA (transient ischemic attack)  7. Hyperlipidemia, unspecified hyperlipidemia type  8. S/P ICD (internal cardiac defibrillator) procedure    Medtronic dual chamber ICD   Today's interrogation shows normal lead and device function   3.9 years on battery   10.2% V-paced  100% Afl  No therapies   OptiVol WNL  - Continue Q3 month remote device transmissions. Follow-up in the EP clinic in one year, or sooner for any concerns.      Ischemic cardiomyopathy  Euvolemic on exam. No signs of CHF.  - OptiVol within normal limits  - Not on GDMT due to hypotension      Atypical atrial flutter  100% burden. Rate well controlled  - continue metoprolol  - Conservative management given asymptomatic without any signs of heart failure  - Eliquis stopped due to fall risk  - Continue aspirin 81 mg 3x/wk (due to low plts)  - not a candidate for WM given significant frailty and likely unable to tolerate short term OAC/DAPT      CAD, s/p CABG  - Continue statin and aspirin     History of TIA  Continue aspirin and statin therapy     Patient is an established patient with plan for longitudinal relationship and ongoing assessment and management of arrhythmias recurrence, monitor, labs, remote transmission, device management as a focal point of continued medical care.   - FU in EP in 1 year with Q3 months remote transmission                    Subjective:     Ian Larson is a 85 y.o. male with ischemic cardiomyopathy, CAD/CABG, prior myocardial infarction, atrial flutter, dyslipidemia and dual chamber Medtronic ICD (upgraded from dual

## 2025-04-16 NOTE — PATIENT INSTRUCTIONS
Continue remote transmission every 3 months  FU with NP in 1 year  FU with Dr. Allred in 2 years     no

## 2025-06-22 PROCEDURE — 93297 REM INTERROG DEV EVAL ICPMS: CPT | Performed by: INTERNAL MEDICINE

## 2025-07-23 PROCEDURE — 93297 REM INTERROG DEV EVAL ICPMS: CPT | Performed by: INTERNAL MEDICINE

## 2025-07-28 ENCOUNTER — OFFICE VISIT (OUTPATIENT)
Age: 85
End: 2025-07-28
Payer: MEDICARE

## 2025-07-28 VITALS
HEART RATE: 71 BPM | BODY MASS INDEX: 19.49 KG/M2 | SYSTOLIC BLOOD PRESSURE: 100 MMHG | WEIGHT: 110 LBS | HEIGHT: 63 IN | OXYGEN SATURATION: 98 % | DIASTOLIC BLOOD PRESSURE: 62 MMHG

## 2025-07-28 DIAGNOSIS — I48.4 ATYPICAL ATRIAL FLUTTER (HCC): ICD-10-CM

## 2025-07-28 DIAGNOSIS — G20.A1 PARKINSON'S DISEASE, UNSPECIFIED WHETHER DYSKINESIA PRESENT, UNSPECIFIED WHETHER MANIFESTATIONS FLUCTUATE (HCC): ICD-10-CM

## 2025-07-28 DIAGNOSIS — R54 FRAILTY: ICD-10-CM

## 2025-07-28 DIAGNOSIS — G45.9 TIA (TRANSIENT ISCHEMIC ATTACK): ICD-10-CM

## 2025-07-28 DIAGNOSIS — I25.10 CORONARY ARTERY DISEASE INVOLVING NATIVE CORONARY ARTERY OF NATIVE HEART WITHOUT ANGINA PECTORIS: Primary | ICD-10-CM

## 2025-07-28 PROCEDURE — 1123F ACP DISCUSS/DSCN MKR DOCD: CPT | Performed by: NURSE PRACTITIONER

## 2025-07-28 PROCEDURE — 1159F MED LIST DOCD IN RCRD: CPT | Performed by: NURSE PRACTITIONER

## 2025-07-28 PROCEDURE — 1126F AMNT PAIN NOTED NONE PRSNT: CPT | Performed by: NURSE PRACTITIONER

## 2025-07-28 PROCEDURE — G8427 DOCREV CUR MEDS BY ELIG CLIN: HCPCS | Performed by: NURSE PRACTITIONER

## 2025-07-28 PROCEDURE — 1160F RVW MEDS BY RX/DR IN RCRD: CPT | Performed by: NURSE PRACTITIONER

## 2025-07-28 PROCEDURE — G8420 CALC BMI NORM PARAMETERS: HCPCS | Performed by: NURSE PRACTITIONER

## 2025-07-28 PROCEDURE — 99213 OFFICE O/P EST LOW 20 MIN: CPT | Performed by: NURSE PRACTITIONER

## 2025-07-28 PROCEDURE — 1036F TOBACCO NON-USER: CPT | Performed by: NURSE PRACTITIONER

## 2025-07-28 ASSESSMENT — PATIENT HEALTH QUESTIONNAIRE - PHQ9
2. FEELING DOWN, DEPRESSED OR HOPELESS: NOT AT ALL
SUM OF ALL RESPONSES TO PHQ QUESTIONS 1-9: 0
1. LITTLE INTEREST OR PLEASURE IN DOING THINGS: NOT AT ALL
SUM OF ALL RESPONSES TO PHQ QUESTIONS 1-9: 0

## 2025-07-28 NOTE — PROGRESS NOTES
Office Follow-up    NAME: Ian Larson   :  1940  MRM:  [id number]    Date:  2025         Assessment and Plan:      Hx of ICD implant now with battery RADHA   -Follows with EP, Dr. Allred       Atrial flutter - no s/sxs   Ventricular pacing intermittent.   -Toprol 12.5mg nightly for rate control  - Previously intolerant to Coreg due to hypotension  -Cont ASA 81mg/day  -At this time Eliquis is more of a risk than the benefit.    Previously discussed the risk for stroke and other complications while not on anticoagulation.  Patient and family aware of risks and able to verbalize.  Patient choosing not to remain anticoagulated at this time.      CAD   -No complaints of angina or anginal equivalents  -Cont ASA, statin, BB     Dyslipidemia   - followed by Dr. Coleman.     -LDL is at goal  - rosuvastatin - 10 MG     Hx of TIA   - on ASA, statin     Parkinsons:   Continue Carbidopa and Levodopa.    Frailty  - Encouraged increased protein intake     Return for follow up in 2026.        Subjective:     Ian Larson, a 85 y.o. year-old who presents for followup. No chest pain, SOB,.     Exam:     /62 (BP Site: Right Upper Arm, Patient Position: Sitting, BP Cuff Size: Medium Adult)   Pulse 71   Ht 1.6 m (5' 3\")   Wt 49.9 kg (110 lb)   SpO2 98%   BMI 19.49 kg/m²      General appearance - alert, well appearing, and in no distress  Mental status - affect appropriate to mood  Eyes - sclera anicteric, moist mucous membranes  Neck - supple, no significant adenopathy    Medications:     Current Outpatient Medications   Medication Sig    rOPINIRole (REQUIP) 1 MG tablet Take by mouth 3 times daily    rosuvastatin (CRESTOR) 10 MG tablet Take 1 tablet by mouth nightly    metoprolol succinate (TOPROL XL) 25 MG extended release tablet Take 0.5 tablets by mouth daily    aspirin 81 MG EC tablet Take 1 tablet by mouth daily    carbidopa-levodopa (SINEMET)  MG per tablet Take 1 tablet by mouth 2

## 2025-08-12 RX ORDER — METOPROLOL SUCCINATE 25 MG/1
12.5 TABLET, EXTENDED RELEASE ORAL DAILY
Qty: 90 TABLET | Refills: 3 | Status: SHIPPED | OUTPATIENT
Start: 2025-08-12

## 2025-09-03 ENCOUNTER — TELEPHONE (OUTPATIENT)
Age: 85
End: 2025-09-03

## (undated) DEVICE — SUTURE STRATAFIX SPRL PDS + SZ 2-0 L9IN ABSRB VLT CT-1 SXPP1B456

## (undated) DEVICE — PACEMAKER PACK: Brand: MEDLINE INDUSTRIES, INC.

## (undated) DEVICE — MEDI-TRACE CADENCE ADULT, DEFIBRILLATION ELECTRODE -RTS  (10 PR/PK) - PHYSIO-CONTROL: Brand: MEDI-TRACE CADENCE

## (undated) DEVICE — ZIP 8I SURGICAL SKIN CLOSURE DEVICE: Brand: ZIP 8I SURGICAL SKIN CLOSURE DEVICE

## (undated) DEVICE — REM POLYHESIVE ADULT PATIENT RETURN ELECTRODE: Brand: VALLEYLAB

## (undated) DEVICE — SUTURE STRATAFIX SPRL MCRYL + SZ 3-0 L24IN ABSRB UD PS-2 SXMP1B108

## (undated) DEVICE — Z DUPLICATE USE 2275497 DRSG POSTOP PRMSL AG 3.5X6IN

## (undated) DEVICE — 3M™ IOBAN™ 2 ANTIMICROBIAL INCISE DRAPE 6640EZ: Brand: IOBAN™ 2

## (undated) DEVICE — PLASMABLADE PS200-040 4.0: Brand: PLASMABLADE™